# Patient Record
Sex: MALE | Race: WHITE | Employment: OTHER | URBAN - METROPOLITAN AREA
[De-identification: names, ages, dates, MRNs, and addresses within clinical notes are randomized per-mention and may not be internally consistent; named-entity substitution may affect disease eponyms.]

---

## 2018-11-28 DIAGNOSIS — N28.89 RENAL MASS: ICD-10-CM

## 2018-11-28 DIAGNOSIS — R73.9 HYPERGLYCEMIA: ICD-10-CM

## 2019-10-16 ENCOUNTER — HOSPITAL ENCOUNTER (OUTPATIENT)
Age: 64
Discharge: HOME | End: 2019-10-18
Payer: COMMERCIAL

## 2019-10-16 ENCOUNTER — OFFICE VISIT (OUTPATIENT)
Dept: PRIMARY CARE CLINIC | Age: 64
End: 2019-10-16
Payer: COMMERCIAL

## 2019-10-16 VITALS
BODY MASS INDEX: 29.92 KG/M2 | WEIGHT: 202 LBS | TEMPERATURE: 98.2 F | DIASTOLIC BLOOD PRESSURE: 86 MMHG | HEIGHT: 69 IN | HEART RATE: 80 BPM | SYSTOLIC BLOOD PRESSURE: 120 MMHG

## 2019-10-16 DIAGNOSIS — R73.9 HYPERGLYCEMIA: Primary | ICD-10-CM

## 2019-10-16 DIAGNOSIS — I10 ESSENTIAL HYPERTENSION: ICD-10-CM

## 2019-10-16 DIAGNOSIS — E78.2 MIXED HYPERLIPIDEMIA: ICD-10-CM

## 2019-10-16 DIAGNOSIS — L02.91 ABSCESS: Primary | ICD-10-CM

## 2019-10-16 DIAGNOSIS — L02.91 ABSCESS: ICD-10-CM

## 2019-10-16 PROCEDURE — 87070 CULTURE OTHR SPECIMN AEROBIC: CPT

## 2019-10-16 PROCEDURE — 99214 OFFICE O/P EST MOD 30 MIN: CPT | Performed by: FAMILY MEDICINE

## 2019-10-16 RX ORDER — VIT C/B6/B5/MAGNESIUM/HERB 173 50-5-6-5MG
1 CAPSULE ORAL DAILY
COMMUNITY

## 2019-10-16 RX ORDER — DOXYCYCLINE HYCLATE 100 MG
100 TABLET ORAL 2 TIMES DAILY
Qty: 10 TABLET | Refills: 0 | Status: SHIPPED | OUTPATIENT
Start: 2019-10-16 | End: 2019-10-21

## 2019-10-16 ASSESSMENT — PATIENT HEALTH QUESTIONNAIRE - PHQ9
SUM OF ALL RESPONSES TO PHQ QUESTIONS 1-9: 0
1. LITTLE INTEREST OR PLEASURE IN DOING THINGS: 0
SUM OF ALL RESPONSES TO PHQ9 QUESTIONS 1 & 2: 0
SUM OF ALL RESPONSES TO PHQ QUESTIONS 1-9: 0
2. FEELING DOWN, DEPRESSED OR HOPELESS: 0

## 2019-10-16 ASSESSMENT — ENCOUNTER SYMPTOMS
EYES NEGATIVE: 1
RESPIRATORY NEGATIVE: 1
ALLERGIC/IMMUNOLOGIC NEGATIVE: 1
GASTROINTESTINAL NEGATIVE: 1

## 2019-10-17 ENCOUNTER — HOSPITAL ENCOUNTER (OUTPATIENT)
Age: 64
Discharge: HOME | End: 2019-10-19
Payer: COMMERCIAL

## 2019-10-17 DIAGNOSIS — E78.2 MIXED HYPERLIPIDEMIA: ICD-10-CM

## 2019-10-17 DIAGNOSIS — R73.9 HYPERGLYCEMIA: ICD-10-CM

## 2019-10-17 DIAGNOSIS — I10 ESSENTIAL HYPERTENSION: ICD-10-CM

## 2019-10-17 DIAGNOSIS — L02.91 ABSCESS: ICD-10-CM

## 2019-10-17 LAB
ALBUMIN SERPL-MCNC: 4.6 G/DL (ref 3.5–5.2)
ALP BLD-CCNC: 85 U/L (ref 40–129)
ALT SERPL-CCNC: 33 U/L (ref 0–40)
ANION GAP SERPL CALCULATED.3IONS-SCNC: 15 MMOL/L (ref 7–16)
AST SERPL-CCNC: 25 U/L (ref 0–39)
BASOPHILS ABSOLUTE: 0.04 E9/L (ref 0–0.2)
BASOPHILS RELATIVE PERCENT: 0.5 % (ref 0–2)
BILIRUB SERPL-MCNC: 0.6 MG/DL (ref 0–1.2)
BUN BLDV-MCNC: 19 MG/DL (ref 8–23)
CALCIUM SERPL-MCNC: 9.7 MG/DL (ref 8.6–10.2)
CHLORIDE BLD-SCNC: 102 MMOL/L (ref 98–107)
CHOLESTEROL, TOTAL: 182 MG/DL (ref 0–199)
CO2: 25 MMOL/L (ref 22–29)
CREAT SERPL-MCNC: 1.4 MG/DL (ref 0.7–1.2)
EOSINOPHILS ABSOLUTE: 0.14 E9/L (ref 0.05–0.5)
EOSINOPHILS RELATIVE PERCENT: 1.7 % (ref 0–6)
GFR AFRICAN AMERICAN: >60
GFR NON-AFRICAN AMERICAN: 51 ML/MIN/1.73
GLUCOSE BLD-MCNC: 146 MG/DL (ref 74–99)
HBA1C MFR BLD: 6.8 % (ref 4–5.6)
HCT VFR BLD CALC: 47 % (ref 37–54)
HDLC SERPL-MCNC: 30 MG/DL
HEMOGLOBIN: 15.5 G/DL (ref 12.5–16.5)
IMMATURE GRANULOCYTES #: 0.03 E9/L
IMMATURE GRANULOCYTES %: 0.4 % (ref 0–5)
LDL CHOLESTEROL CALCULATED: 104 MG/DL (ref 0–99)
LYMPHOCYTES ABSOLUTE: 1.61 E9/L (ref 1.5–4)
LYMPHOCYTES RELATIVE PERCENT: 19.9 % (ref 20–42)
MCH RBC QN AUTO: 31.3 PG (ref 26–35)
MCHC RBC AUTO-ENTMCNC: 33 % (ref 32–34.5)
MCV RBC AUTO: 94.9 FL (ref 80–99.9)
MONOCYTES ABSOLUTE: 0.64 E9/L (ref 0.1–0.95)
MONOCYTES RELATIVE PERCENT: 7.9 % (ref 2–12)
NEUTROPHILS ABSOLUTE: 5.64 E9/L (ref 1.8–7.3)
NEUTROPHILS RELATIVE PERCENT: 69.6 % (ref 43–80)
PDW BLD-RTO: 13.1 FL (ref 11.5–15)
PLATELET # BLD: 211 E9/L (ref 130–450)
PMV BLD AUTO: 10.1 FL (ref 7–12)
POTASSIUM SERPL-SCNC: 4.2 MMOL/L (ref 3.5–5)
RBC # BLD: 4.95 E12/L (ref 3.8–5.8)
SODIUM BLD-SCNC: 142 MMOL/L (ref 132–146)
TOTAL PROTEIN: 7.8 G/DL (ref 6.4–8.3)
TRIGL SERPL-MCNC: 241 MG/DL (ref 0–149)
VLDLC SERPL CALC-MCNC: 48 MG/DL
WBC # BLD: 8.1 E9/L (ref 4.5–11.5)

## 2019-10-17 PROCEDURE — 80061 LIPID PANEL: CPT

## 2019-10-17 PROCEDURE — 83036 HEMOGLOBIN GLYCOSYLATED A1C: CPT

## 2019-10-17 PROCEDURE — 36415 COLL VENOUS BLD VENIPUNCTURE: CPT

## 2019-10-17 PROCEDURE — 80053 COMPREHEN METABOLIC PANEL: CPT

## 2019-10-17 PROCEDURE — 85025 COMPLETE CBC W/AUTO DIFF WBC: CPT

## 2019-10-18 LAB
ORGANISM: ABNORMAL
WOUND/ABSCESS: ABNORMAL

## 2019-10-24 ENCOUNTER — OFFICE VISIT (OUTPATIENT)
Dept: PRIMARY CARE CLINIC | Age: 64
End: 2019-10-24
Payer: COMMERCIAL

## 2019-10-24 VITALS
DIASTOLIC BLOOD PRESSURE: 82 MMHG | WEIGHT: 199 LBS | BODY MASS INDEX: 29.39 KG/M2 | SYSTOLIC BLOOD PRESSURE: 132 MMHG | TEMPERATURE: 98.2 F | HEART RATE: 88 BPM

## 2019-10-24 DIAGNOSIS — E78.2 MIXED HYPERLIPIDEMIA: ICD-10-CM

## 2019-10-24 DIAGNOSIS — N18.9 CHRONIC KIDNEY DISEASE, UNSPECIFIED CKD STAGE: ICD-10-CM

## 2019-10-24 DIAGNOSIS — I25.10 CORONARY ARTERY DISEASE INVOLVING NATIVE CORONARY ARTERY OF NATIVE HEART WITHOUT ANGINA PECTORIS: ICD-10-CM

## 2019-10-24 DIAGNOSIS — L02.91 ABSCESS: ICD-10-CM

## 2019-10-24 DIAGNOSIS — Z12.11 SCREENING FOR COLON CANCER: Primary | ICD-10-CM

## 2019-10-24 DIAGNOSIS — R73.9 HYPERGLYCEMIA: ICD-10-CM

## 2019-10-24 DIAGNOSIS — I10 ESSENTIAL HYPERTENSION: ICD-10-CM

## 2019-10-24 PROCEDURE — 99214 OFFICE O/P EST MOD 30 MIN: CPT | Performed by: FAMILY MEDICINE

## 2019-10-24 ASSESSMENT — ENCOUNTER SYMPTOMS
EYES NEGATIVE: 1
GASTROINTESTINAL NEGATIVE: 1
RESPIRATORY NEGATIVE: 1
ALLERGIC/IMMUNOLOGIC NEGATIVE: 1

## 2019-11-16 ENCOUNTER — HOSPITAL ENCOUNTER (OUTPATIENT)
Age: 64
Discharge: HOME | End: 2019-11-18
Payer: COMMERCIAL

## 2019-11-16 DIAGNOSIS — E78.2 MIXED HYPERLIPIDEMIA: ICD-10-CM

## 2019-11-16 LAB
ALBUMIN SERPL-MCNC: 4.8 G/DL (ref 3.5–5.2)
ALP BLD-CCNC: 84 U/L (ref 40–129)
ALT SERPL-CCNC: 34 U/L (ref 0–40)
ANION GAP SERPL CALCULATED.3IONS-SCNC: 16 MMOL/L (ref 7–16)
AST SERPL-CCNC: 32 U/L (ref 0–39)
BILIRUB SERPL-MCNC: 0.8 MG/DL (ref 0–1.2)
BUN BLDV-MCNC: 22 MG/DL (ref 8–23)
CALCIUM SERPL-MCNC: 9.6 MG/DL (ref 8.6–10.2)
CHLORIDE BLD-SCNC: 94 MMOL/L (ref 98–107)
CHOLESTEROL, TOTAL: 171 MG/DL (ref 0–199)
CO2: 26 MMOL/L (ref 22–29)
CREAT SERPL-MCNC: 1.3 MG/DL (ref 0.7–1.2)
GFR AFRICAN AMERICAN: >60
GFR NON-AFRICAN AMERICAN: 55 ML/MIN/1.73
GLUCOSE BLD-MCNC: 146 MG/DL (ref 74–99)
HDLC SERPL-MCNC: 31 MG/DL
LDL CHOLESTEROL CALCULATED: 106 MG/DL (ref 0–99)
POTASSIUM SERPL-SCNC: 4 MMOL/L (ref 3.5–5)
SODIUM BLD-SCNC: 136 MMOL/L (ref 132–146)
TOTAL PROTEIN: 7.6 G/DL (ref 6.4–8.3)
TRIGL SERPL-MCNC: 172 MG/DL (ref 0–149)
VLDLC SERPL CALC-MCNC: 34 MG/DL

## 2019-11-16 PROCEDURE — 36415 COLL VENOUS BLD VENIPUNCTURE: CPT

## 2019-11-16 PROCEDURE — 80061 LIPID PANEL: CPT

## 2019-11-16 PROCEDURE — 80053 COMPREHEN METABOLIC PANEL: CPT

## 2019-11-18 DIAGNOSIS — Z12.11 SCREENING FOR COLON CANCER: ICD-10-CM

## 2019-11-18 LAB
CONTROL: POSITIVE
HEMOCCULT STL QL: NEGATIVE

## 2019-11-18 PROCEDURE — 82274 ASSAY TEST FOR BLOOD FECAL: CPT | Performed by: FAMILY MEDICINE

## 2019-11-21 ENCOUNTER — OFFICE VISIT (OUTPATIENT)
Dept: PRIMARY CARE CLINIC | Age: 64
End: 2019-11-21
Payer: COMMERCIAL

## 2019-11-21 VITALS
HEART RATE: 80 BPM | TEMPERATURE: 98.2 F | SYSTOLIC BLOOD PRESSURE: 134 MMHG | DIASTOLIC BLOOD PRESSURE: 80 MMHG | WEIGHT: 195 LBS | BODY MASS INDEX: 28.8 KG/M2

## 2019-11-21 DIAGNOSIS — L02.91 ABSCESS: ICD-10-CM

## 2019-11-21 DIAGNOSIS — E78.2 MIXED HYPERLIPIDEMIA: ICD-10-CM

## 2019-11-21 DIAGNOSIS — I25.10 CORONARY ARTERY DISEASE INVOLVING NATIVE CORONARY ARTERY OF NATIVE HEART WITHOUT ANGINA PECTORIS: ICD-10-CM

## 2019-11-21 DIAGNOSIS — N18.9 CHRONIC KIDNEY DISEASE, UNSPECIFIED CKD STAGE: ICD-10-CM

## 2019-11-21 DIAGNOSIS — R73.9 HYPERGLYCEMIA: Primary | ICD-10-CM

## 2019-11-21 DIAGNOSIS — I10 ESSENTIAL HYPERTENSION: ICD-10-CM

## 2019-11-21 PROCEDURE — 99214 OFFICE O/P EST MOD 30 MIN: CPT | Performed by: FAMILY MEDICINE

## 2019-11-21 RX ORDER — ATORVASTATIN CALCIUM 20 MG/1
20 TABLET, FILM COATED ORAL DAILY
Qty: 30 TABLET | Refills: 6 | Status: SHIPPED | OUTPATIENT
Start: 2019-11-21

## 2019-11-21 ASSESSMENT — ENCOUNTER SYMPTOMS
EYES NEGATIVE: 1
ALLERGIC/IMMUNOLOGIC NEGATIVE: 1
RESPIRATORY NEGATIVE: 1
GASTROINTESTINAL NEGATIVE: 1

## 2020-02-15 ENCOUNTER — HOSPITAL ENCOUNTER (OUTPATIENT)
Age: 65
Discharge: HOME | End: 2020-02-17
Payer: COMMERCIAL

## 2020-02-15 LAB
ALBUMIN SERPL-MCNC: 4.5 G/DL (ref 3.5–5.2)
ALP BLD-CCNC: 95 U/L (ref 40–129)
ALT SERPL-CCNC: 25 U/L (ref 0–40)
ANION GAP SERPL CALCULATED.3IONS-SCNC: 14 MMOL/L (ref 7–16)
AST SERPL-CCNC: 21 U/L (ref 0–39)
BASOPHILS ABSOLUTE: 0.03 E9/L (ref 0–0.2)
BASOPHILS RELATIVE PERCENT: 0.3 % (ref 0–2)
BILIRUB SERPL-MCNC: 0.9 MG/DL (ref 0–1.2)
BUN BLDV-MCNC: 17 MG/DL (ref 8–23)
CALCIUM SERPL-MCNC: 9.7 MG/DL (ref 8.6–10.2)
CHLORIDE BLD-SCNC: 98 MMOL/L (ref 98–107)
CHOLESTEROL, TOTAL: 119 MG/DL (ref 0–199)
CO2: 28 MMOL/L (ref 22–29)
CREAT SERPL-MCNC: 1.3 MG/DL (ref 0.7–1.2)
EOSINOPHILS ABSOLUTE: 0.17 E9/L (ref 0.05–0.5)
EOSINOPHILS RELATIVE PERCENT: 1.7 % (ref 0–6)
GFR AFRICAN AMERICAN: >60
GFR NON-AFRICAN AMERICAN: 55 ML/MIN/1.73
GLUCOSE BLD-MCNC: 157 MG/DL (ref 74–99)
HBA1C MFR BLD: 6.7 % (ref 4–5.6)
HCT VFR BLD CALC: 45.6 % (ref 37–54)
HDLC SERPL-MCNC: 28 MG/DL
HEMOGLOBIN: 15.3 G/DL (ref 12.5–16.5)
IMMATURE GRANULOCYTES #: 0.02 E9/L
IMMATURE GRANULOCYTES %: 0.2 % (ref 0–5)
LDL CHOLESTEROL CALCULATED: 43 MG/DL (ref 0–99)
LYMPHOCYTES ABSOLUTE: 1.4 E9/L (ref 1.5–4)
LYMPHOCYTES RELATIVE PERCENT: 14.2 % (ref 20–42)
MCH RBC QN AUTO: 31.1 PG (ref 26–35)
MCHC RBC AUTO-ENTMCNC: 33.6 % (ref 32–34.5)
MCV RBC AUTO: 92.7 FL (ref 80–99.9)
MICROALBUMIN UR-MCNC: <12 MG/L
MONOCYTES ABSOLUTE: 0.72 E9/L (ref 0.1–0.95)
MONOCYTES RELATIVE PERCENT: 7.3 % (ref 2–12)
NEUTROPHILS ABSOLUTE: 7.51 E9/L (ref 1.8–7.3)
NEUTROPHILS RELATIVE PERCENT: 76.3 % (ref 43–80)
PDW BLD-RTO: 12.8 FL (ref 11.5–15)
PLATELET # BLD: 204 E9/L (ref 130–450)
PMV BLD AUTO: 10.3 FL (ref 7–12)
POTASSIUM SERPL-SCNC: 3.9 MMOL/L (ref 3.5–5)
RBC # BLD: 4.92 E12/L (ref 3.8–5.8)
SODIUM BLD-SCNC: 140 MMOL/L (ref 132–146)
TOTAL PROTEIN: 7.3 G/DL (ref 6.4–8.3)
TRIGL SERPL-MCNC: 238 MG/DL (ref 0–149)
VLDLC SERPL CALC-MCNC: 48 MG/DL
WBC # BLD: 9.9 E9/L (ref 4.5–11.5)

## 2020-02-15 PROCEDURE — 83036 HEMOGLOBIN GLYCOSYLATED A1C: CPT

## 2020-02-15 PROCEDURE — 82044 UR ALBUMIN SEMIQUANTITATIVE: CPT

## 2020-02-15 PROCEDURE — 80053 COMPREHEN METABOLIC PANEL: CPT

## 2020-02-15 PROCEDURE — 85025 COMPLETE CBC W/AUTO DIFF WBC: CPT

## 2020-02-15 PROCEDURE — 80061 LIPID PANEL: CPT

## 2020-02-15 PROCEDURE — 36415 COLL VENOUS BLD VENIPUNCTURE: CPT

## 2020-02-24 ENCOUNTER — OFFICE VISIT (OUTPATIENT)
Dept: PRIMARY CARE CLINIC | Age: 65
End: 2020-02-24
Payer: COMMERCIAL

## 2020-02-24 VITALS
HEART RATE: 76 BPM | SYSTOLIC BLOOD PRESSURE: 120 MMHG | TEMPERATURE: 98.2 F | WEIGHT: 198 LBS | BODY MASS INDEX: 29.24 KG/M2 | DIASTOLIC BLOOD PRESSURE: 70 MMHG

## 2020-02-24 PROCEDURE — 99214 OFFICE O/P EST MOD 30 MIN: CPT | Performed by: FAMILY MEDICINE

## 2020-02-24 ASSESSMENT — PATIENT HEALTH QUESTIONNAIRE - PHQ9
2. FEELING DOWN, DEPRESSED OR HOPELESS: 0
SUM OF ALL RESPONSES TO PHQ QUESTIONS 1-9: 0
SUM OF ALL RESPONSES TO PHQ9 QUESTIONS 1 & 2: 0
1. LITTLE INTEREST OR PLEASURE IN DOING THINGS: 0
SUM OF ALL RESPONSES TO PHQ QUESTIONS 1-9: 0

## 2020-02-24 ASSESSMENT — ENCOUNTER SYMPTOMS
ALLERGIC/IMMUNOLOGIC NEGATIVE: 1
EYES NEGATIVE: 1
GASTROINTESTINAL NEGATIVE: 1
RESPIRATORY NEGATIVE: 1

## 2020-02-24 NOTE — PROGRESS NOTES
OFFICE PROGRESS NOTE      SUBJECTIVE:        Patient ID:   Jaylan Mann is a 72 y.o. male who presents for   Chief Complaint   Patient presents with    Hypertension     Here for recheck on Hypertension,Hyperlipidemia and Hyperglycemia. Lab work done,here for review. States glucose was 180 today. HPI:     Patient states he is feeling good  Has been followed by the cardiologist  Has been followed by the nephrologist  Lab work showed elevated blood sugar  Patient states he did not follow the diet and he did not exercise enough  Eye exam is normal      Prior to Visit Medications    Medication Sig Taking?  Authorizing Provider   atorvastatin (LIPITOR) 20 MG tablet Take 1 tablet by mouth daily Yes Otto Servin MD   Turmeric 500 MG CAPS Take 1 capsule by mouth daily Yes Historical Provider, MD   DilTIAZem HCl ER Coated Beads 240 MG TB24 TAKE ONE TABLET BY MOUTH EVERY DAY Yes Historical Provider, MD   potassium chloride (KLOR-CON) 10 MEQ extended release tablet TAKE ONE TABLET BY MOUTH EVERY DAY Yes Historical Provider, MD   Glucos-Chondroit-Hyaluron-MSM (GLUCOSAMINE CHONDROITIN JOINT PO) Take 1,500 mg by mouth daily Yes Historical Provider, MD   aspirin 81 MG tablet Take 81 mg by mouth daily  Yes Historical Provider, MD   hydrochlorothiazide (HYDRODIURIL) 25 MG tablet Take 25 mg by mouth daily Yes Historical Provider, MD      Social History     Socioeconomic History    Marital status:      Spouse name: None    Number of children: None    Years of education: None    Highest education level: None   Occupational History    None   Social Needs    Financial resource strain: None    Food insecurity:     Worry: None     Inability: None    Transportation needs:     Medical: None     Non-medical: None   Tobacco Use    Smoking status: Never Smoker    Smokeless tobacco: Never Used   Substance and Sexual Activity    Alcohol use: No    Drug use: No    Sexual activity: None   Lifestyle    Physical activity:     Days per week: None     Minutes per session: None    Stress: None   Relationships    Social connections:     Talks on phone: None     Gets together: None     Attends Mandaeism service: None     Active member of club or organization: None     Attends meetings of clubs or organizations: None     Relationship status: None    Intimate partner violence:     Fear of current or ex partner: None     Emotionally abused: None     Physically abused: None     Forced sexual activity: None   Other Topics Concern    None   Social History Narrative    None       I have reviewed Asim's allergies, medications, problem list, medical, social and family history and have updated as needed in the electronic medical record  Review Of Systems:    Review of Systems   Constitutional: Negative. HENT: Negative. Eyes: Negative. Respiratory: Negative. Cardiovascular: Negative. Gastrointestinal: Negative. Endocrine: Negative. Genitourinary: Negative. Musculoskeletal: Negative. Allergic/Immunologic: Negative. Neurological: Negative. Hematological: Negative. Psychiatric/Behavioral: Negative. OBJECTIVE:     VS:  Wt Readings from Last 3 Encounters:   02/24/20 198 lb (89.8 kg)   11/21/19 195 lb (88.5 kg)   10/24/19 199 lb (90.3 kg)     Temp Readings from Last 3 Encounters:   02/24/20 98.2 °F (36.8 °C) (Oral)   11/21/19 98.2 °F (36.8 °C) (Oral)   10/24/19 98.2 °F (36.8 °C) (Oral)     BP Readings from Last 3 Encounters:   02/24/20 120/70   11/21/19 134/80   10/24/19 132/82        Physical Exam  Constitutional:       Appearance: He is well-developed. HENT:      Head: Normocephalic and atraumatic. Eyes:      Conjunctiva/sclera: Conjunctivae normal.      Pupils: Pupils are equal, round, and reactive to light. Neck:      Musculoskeletal: Normal range of motion and neck supple.    Cardiovascular:      Rate and Rhythm: Normal rate and

## 2020-03-16 ENCOUNTER — TELEPHONE (OUTPATIENT)
Dept: PRIMARY CARE CLINIC | Age: 65
End: 2020-03-16

## 2020-03-16 NOTE — TELEPHONE ENCOUNTER
Message left for patient that he may do his lab work for Dr Tate Albright the same time as his lab work for Dr José Miguel Yanes.

## 2020-03-16 NOTE — TELEPHONE ENCOUNTER
Patient called this morning wanting to speak with Kurtis Son about his visit with the urologist on Friday. States he was 10 min late and they gave him a hard time about it. States he ended up almost walking out. States he is supposed to have bloodwork done for them and for us and doesn't want to do it twice. Patient does not really want to go back and requests nurse to call the cell. If he doesn't answer leave a message and he will call back.

## 2022-11-30 ENCOUNTER — TELEPHONE (OUTPATIENT)
Dept: FAMILY MEDICINE CLINIC | Age: 67
End: 2022-11-30

## 2022-11-30 DIAGNOSIS — R73.9 HYPERGLYCEMIA: ICD-10-CM

## 2022-11-30 DIAGNOSIS — E78.2 MIXED HYPERLIPIDEMIA: ICD-10-CM

## 2022-11-30 DIAGNOSIS — I10 HYPERTENSION, UNSPECIFIED TYPE: Primary | ICD-10-CM

## 2022-11-30 NOTE — TELEPHONE ENCOUNTER
----- Message from Rich Warren sent at 11/30/2022  3:13 PM EST -----  Subject: Message to Provider    QUESTIONS  Information for Provider? Patient wants to know if he needs blood work for   his physical.   ---------------------------------------------------------------------------  --------------  Jimenez Segura KRISTINA  [phone]; OK to leave message on voicemail  ---------------------------------------------------------------------------  --------------  SCRIPT ANSWERS  Relationship to Patient?  Self

## 2022-12-01 ENCOUNTER — HOSPITAL ENCOUNTER (OUTPATIENT)
Dept: CARDIAC REHAB | Age: 67
Setting detail: THERAPIES SERIES
Discharge: HOME | End: 2022-12-01
Payer: COMMERCIAL

## 2022-12-01 VITALS
HEART RATE: 73 BPM | WEIGHT: 180.3 LBS | RESPIRATION RATE: 18 BRPM | DIASTOLIC BLOOD PRESSURE: 74 MMHG | SYSTOLIC BLOOD PRESSURE: 130 MMHG | BODY MASS INDEX: 26.7 KG/M2 | HEIGHT: 69 IN

## 2022-12-01 PROCEDURE — 93797 PHYS/QHP OP CAR RHAB WO ECG: CPT

## 2022-12-01 PROCEDURE — 93798 PHYS/QHP OP CAR RHAB W/ECG: CPT

## 2022-12-01 ASSESSMENT — EXERCISE STRESS TEST
PEAK_RPE: 6
PEAK_RPD: 0
PEAK_HR: 109
PEAK_BP: 134/70
PEAK_BP: 136/72

## 2022-12-01 ASSESSMENT — PATIENT HEALTH QUESTIONNAIRE - PHQ9
5. POOR APPETITE OR OVEREATING: 0
SUM OF ALL RESPONSES TO PHQ QUESTIONS 1-9: 0
9. THOUGHTS THAT YOU WOULD BE BETTER OFF DEAD, OR OF HURTING YOURSELF: 0
6. FEELING BAD ABOUT YOURSELF - OR THAT YOU ARE A FAILURE OR HAVE LET YOURSELF OR YOUR FAMILY DOWN: 0
7. TROUBLE CONCENTRATING ON THINGS, SUCH AS READING THE NEWSPAPER OR WATCHING TELEVISION: 0
SUM OF ALL RESPONSES TO PHQ QUESTIONS 1-9: 0
8. MOVING OR SPEAKING SO SLOWLY THAT OTHER PEOPLE COULD HAVE NOTICED. OR THE OPPOSITE, BEING SO FIGETY OR RESTLESS THAT YOU HAVE BEEN MOVING AROUND A LOT MORE THAN USUAL: 0
10. IF YOU CHECKED OFF ANY PROBLEMS, HOW DIFFICULT HAVE THESE PROBLEMS MADE IT FOR YOU TO DO YOUR WORK, TAKE CARE OF THINGS AT HOME, OR GET ALONG WITH OTHER PEOPLE: 0
2. FEELING DOWN, DEPRESSED OR HOPELESS: 0
1. LITTLE INTEREST OR PLEASURE IN DOING THINGS: 0
3. TROUBLE FALLING OR STAYING ASLEEP: 0
4. FEELING TIRED OR HAVING LITTLE ENERGY: 0
SUM OF ALL RESPONSES TO PHQ9 QUESTIONS 1 & 2: 0

## 2022-12-01 ASSESSMENT — EJECTION FRACTION: EF_VALUE: 50

## 2022-12-02 ENCOUNTER — HOSPITAL ENCOUNTER (OUTPATIENT)
Dept: CARDIAC REHAB | Age: 67
Setting detail: THERAPIES SERIES
Discharge: HOME | End: 2022-12-02
Payer: COMMERCIAL

## 2022-12-02 PROCEDURE — 93798 PHYS/QHP OP CAR RHAB W/ECG: CPT

## 2022-12-02 RX ORDER — CLOPIDOGREL BISULFATE 75 MG/1
75 TABLET ORAL DAILY
COMMUNITY

## 2022-12-02 NOTE — PROGRESS NOTES
12/01/22 1130   Treatment Diagnosis   Treatment Diagnosis 1 NSTEMI   NSTEMI Date 10/04/22   Treatment Diagnosis 2 CABG   CABG Date 10/10/22   Referral Date 11/23/22   Oxygen Saturation / Titration    Stages of Change  Maintenance   Oxygen Intervention   Oxygen Use No   O2 Sat Greater Than 90% Yes   Individual Treatment Plan   ITP Visit Type Initial assessment   1st Date of Exercise  12/01/22   ITP Next Review Date 12/26/22   Visit #/Total Visits 2/36   EF% 50 %   Risk Stratification Low   ITP Exercise;Psychosocial;Tobacco;Nutrition;Education   Exercise    DASI Total Score 24.2   Stages of Change Action   Test Six minute walk test   Data Measured Before Walk   Heart Rate 73   Blood Pressure 130/74   O2 Saturation 96   O2 Device Room air   RPD 0   RPE 6   Data Measured during Walk   Indicate Mode of RPE 3 minutes   RPE Data Measured During   Do You Have Shortness of Breath No   Describe Type of Pain You Are Having none   Peak RPE 6   Peak RPD 0   Data Measured Immediately After Walk   Distance Walked in Meters 363 meters   Peak Heart Rate 109   Peak Blood Pressure 134/70   Modified Radha Scale 0   RPE 6   O2 Saturation 94   Data Measured at 5 Minutes After Walk   Heart Rate 70   Blood Pressure 128/68   SpO2 98 %   Comments PT tolerated walk well   Exercise Prescription   Mode Treadmill;Bike;Stepper;Ergometer   Frequency per week 3x/week   Duration Per Session 30-40 minutes   Intensity METS       2-4   RPE 11-17   Progression to 60 minutes   Target Heart Rate    Resistance Training No   Exercise Blood Pressures   Resting /74   Peak /72   Is BP WDL Yes   Exercise Activity at Home   Type walking   Frequency off days   Duration 30 minutes   Resistance Training No   Exercise Education   Education Exercise safety;RPE scale;Signs/symptoms to report;Equipment orientation   Exercise Target Goal   Target Goal(s) Individual exercise RX   Psychosocial   Stages of Change Maintenance   PHQ-9 Total Score 0 Psychosocial Intervention   Interventions No intervention indicated   Currently Taking Psychotropic Meds No   Medication Changes No   Psychosocial Education   Education Advanced directives   Psychosocial Target Goals   Target Goal(s) Assess presence or absence of depression using a valid screening tool   Tobacco   Stages of Change Maintenance   Tobacco Use No   Tobacco Cessation Intervention   Smoking Cessation Referral No   Tobacco Adjunct No   Tobacco Education   Resource Information Provided Yes   Nutrition   Diabetes Yes      FBS Date 12/02/22   Lipids   Date Lipids Drawn 02/15/20   Total 119   HDL 28      Triglycerides 238   Weight Management   Rate Your Plate Total Score 50   Alcohol None   Weight  180 lb 4.8 oz (81.8 kg)   Height  5' 9\" (1.753 m)   BMI 26.68   Education   Cardiac Knowledge Total Score 7   Education Intervention   Education Schedule Given Yes   Patient Education    Education Risk factors   Hypertension Yes   Is BP WDL Yes   Med(s) Change No   BP Meds metoprolol   ACE/ARB Prescribed No   ASA Prescribed Yes   ASA Adherent Yes   Antiplatelet Prescribed Yes   Antiplatelet Adherent Yes   BB Prescribed Yes   BB Adherent Yes   Statins Prescribed Yes   Statins Adherent Yes   Statin Intensity Moderate   Education Target Goals   Target Goals Medication compliance   Staff Treatment Goals   Goals Exercise; Functional capacity   Exercise Goal increase stamina and endurance   Exercise Goal Status Initial   Exercise Goal Assessment Key functional changes

## 2022-12-05 ENCOUNTER — HOSPITAL ENCOUNTER (OUTPATIENT)
Dept: CARDIAC REHAB | Age: 67
Setting detail: THERAPIES SERIES
Discharge: HOME | End: 2022-12-05
Payer: COMMERCIAL

## 2022-12-05 PROCEDURE — 93798 PHYS/QHP OP CAR RHAB W/ECG: CPT

## 2022-12-06 DIAGNOSIS — I10 HYPERTENSION, UNSPECIFIED TYPE: ICD-10-CM

## 2022-12-06 DIAGNOSIS — E78.2 MIXED HYPERLIPIDEMIA: ICD-10-CM

## 2022-12-06 DIAGNOSIS — R73.9 HYPERGLYCEMIA: ICD-10-CM

## 2022-12-06 LAB
HCT VFR BLD CALC: 42.8 % (ref 37–54)
HEMOGLOBIN: 13.9 G/DL (ref 12.5–16.5)
MCH RBC QN AUTO: 30.2 PG (ref 26–35)
MCHC RBC AUTO-ENTMCNC: 32.5 % (ref 32–34.5)
MCV RBC AUTO: 93 FL (ref 80–99.9)
PDW BLD-RTO: 13.6 FL (ref 11.5–15)
PLATELET # BLD: 213 E9/L (ref 130–450)
PMV BLD AUTO: 10 FL (ref 7–12)
RBC # BLD: 4.6 E12/L (ref 3.8–5.8)
WBC # BLD: 13.2 E9/L (ref 4.5–11.5)

## 2022-12-07 ENCOUNTER — HOSPITAL ENCOUNTER (OUTPATIENT)
Dept: CARDIAC REHAB | Age: 67
Setting detail: THERAPIES SERIES
Discharge: HOME | End: 2022-12-07
Payer: COMMERCIAL

## 2022-12-07 LAB
ALBUMIN SERPL-MCNC: 4.3 G/DL (ref 3.5–5.2)
ALP BLD-CCNC: 105 U/L (ref 40–129)
ALT SERPL-CCNC: 17 U/L (ref 0–40)
ANION GAP SERPL CALCULATED.3IONS-SCNC: 16 MMOL/L (ref 7–16)
AST SERPL-CCNC: 16 U/L (ref 0–39)
BILIRUB SERPL-MCNC: 1.1 MG/DL (ref 0–1.2)
BUN BLDV-MCNC: 15 MG/DL (ref 6–23)
CALCIUM SERPL-MCNC: 9.7 MG/DL (ref 8.6–10.2)
CHLORIDE BLD-SCNC: 97 MMOL/L (ref 98–107)
CHOLESTEROL, TOTAL: 91 MG/DL (ref 0–199)
CO2: 24 MMOL/L (ref 22–29)
CREAT SERPL-MCNC: 1.1 MG/DL (ref 0.7–1.2)
GFR SERPL CREATININE-BSD FRML MDRD: >60 ML/MIN/1.73
GLUCOSE BLD-MCNC: 161 MG/DL (ref 74–99)
HDLC SERPL-MCNC: 29 MG/DL
LDL CHOLESTEROL CALCULATED: 38 MG/DL (ref 0–99)
POTASSIUM SERPL-SCNC: 4.1 MMOL/L (ref 3.5–5)
SODIUM BLD-SCNC: 137 MMOL/L (ref 132–146)
TOTAL PROTEIN: 7.2 G/DL (ref 6.4–8.3)
TRIGL SERPL-MCNC: 119 MG/DL (ref 0–149)
VLDLC SERPL CALC-MCNC: 24 MG/DL

## 2022-12-07 PROCEDURE — 93798 PHYS/QHP OP CAR RHAB W/ECG: CPT

## 2022-12-08 ENCOUNTER — OFFICE VISIT (OUTPATIENT)
Dept: FAMILY MEDICINE CLINIC | Age: 67
End: 2022-12-08

## 2022-12-08 VITALS
OXYGEN SATURATION: 96 % | BODY MASS INDEX: 27.02 KG/M2 | SYSTOLIC BLOOD PRESSURE: 122 MMHG | DIASTOLIC BLOOD PRESSURE: 74 MMHG | WEIGHT: 183 LBS | HEART RATE: 75 BPM | TEMPERATURE: 97.1 F

## 2022-12-08 DIAGNOSIS — R73.9 HYPERGLYCEMIA: ICD-10-CM

## 2022-12-08 DIAGNOSIS — I10 HYPERTENSION, UNSPECIFIED TYPE: Primary | ICD-10-CM

## 2022-12-08 DIAGNOSIS — I25.110 CORONARY ARTERY DISEASE INVOLVING NATIVE CORONARY ARTERY OF NATIVE HEART WITH UNSTABLE ANGINA PECTORIS (HCC): ICD-10-CM

## 2022-12-08 DIAGNOSIS — E78.2 MIXED HYPERLIPIDEMIA: ICD-10-CM

## 2022-12-08 DIAGNOSIS — E11.9 DIABETES MELLITUS WITHOUT COMPLICATION (HCC): ICD-10-CM

## 2022-12-08 PROBLEM — R91.1 LUNG NODULE: Status: ACTIVE | Noted: 2022-10-07

## 2022-12-08 PROBLEM — N28.89 RENAL MASS: Status: ACTIVE | Noted: 2018-03-19

## 2022-12-08 PROBLEM — D41.01 NEOPLASM OF UNCERTAIN BEHAVIOR OF RIGHT KIDNEY: Status: ACTIVE | Noted: 2018-05-09

## 2022-12-08 PROBLEM — C64.1 RENAL CELL CARCINOMA OF RIGHT KIDNEY (HCC): Status: ACTIVE | Noted: 2019-06-03

## 2022-12-08 PROBLEM — Z90.5 H/O PARTIAL NEPHRECTOMY: Status: ACTIVE | Noted: 2021-06-10

## 2022-12-08 RX ORDER — NITROGLYCERIN 0.4 MG/1
0.4 TABLET SUBLINGUAL EVERY 5 MIN PRN
COMMUNITY
Start: 2022-10-06 | End: 2023-10-01

## 2022-12-08 RX ORDER — ATORVASTATIN CALCIUM 80 MG/1
TABLET, FILM COATED ORAL
COMMUNITY
Start: 2022-11-11

## 2022-12-08 RX ORDER — SILDENAFIL 50 MG/1
TABLET, FILM COATED ORAL
COMMUNITY
Start: 2022-12-01

## 2022-12-08 RX ORDER — GLIPIZIDE 2.5 MG/1
2.5 TABLET, EXTENDED RELEASE ORAL DAILY
Qty: 30 TABLET | Refills: 3 | Status: SHIPPED | OUTPATIENT
Start: 2022-12-08

## 2022-12-08 SDOH — ECONOMIC STABILITY: FOOD INSECURITY: WITHIN THE PAST 12 MONTHS, YOU WORRIED THAT YOUR FOOD WOULD RUN OUT BEFORE YOU GOT MONEY TO BUY MORE.: NEVER TRUE

## 2022-12-08 SDOH — ECONOMIC STABILITY: FOOD INSECURITY: WITHIN THE PAST 12 MONTHS, THE FOOD YOU BOUGHT JUST DIDN'T LAST AND YOU DIDN'T HAVE MONEY TO GET MORE.: NEVER TRUE

## 2022-12-08 ASSESSMENT — ENCOUNTER SYMPTOMS
ALLERGIC/IMMUNOLOGIC NEGATIVE: 1
RESPIRATORY NEGATIVE: 1
GASTROINTESTINAL NEGATIVE: 1
EYES NEGATIVE: 1

## 2022-12-08 ASSESSMENT — SOCIAL DETERMINANTS OF HEALTH (SDOH): HOW HARD IS IT FOR YOU TO PAY FOR THE VERY BASICS LIKE FOOD, HOUSING, MEDICAL CARE, AND HEATING?: NOT HARD AT ALL

## 2022-12-08 NOTE — PROGRESS NOTES
OFFICE PROGRESS NOTE      SUBJECTIVE:        Patient ID:   Shayanynes Paige is a 79 y.o. male who presents for   Chief Complaint   Patient presents with    Hypertension     Here for recheck on Hypertension,Hyperlipidemia  and Hyperglycemia. Lab work done,here for review. Patient reports feeling well. Attending cardiac rehab 3 times a week. HPI:   Patient here for the follow-up  Patient had a cardiac surgery and has been going to the rehab  Has diabetes had been started with Januvia  Patient did not like Januvia he wants to start different medication  Lab work reviewed  Shows that blood sugar 161  Rest of the lab work is normal  Patient does follow the diet  Also follows up with a cardiologist      Prior to Visit Medications    Medication Sig Taking? Authorizing Provider   atorvastatin (LIPITOR) 80 MG tablet TAKE ONE TABLET BY MOUTH DAILY Yes Historical Provider, MD   metoprolol tartrate (LOPRESSOR) 25 MG tablet TAKE 2 TABLETS BY MOUTH TWICE DAILY Yes Historical Provider, MD   nitroGLYCERIN (NITROSTAT) 0.4 MG SL tablet Place 0.4 mg under the tongue every 5 minutes as needed Yes Historical Provider, MD   sildenafil (VIAGRA) 50 MG tablet TAKE 1 TABLET BY MOUTH IF NEEDED AS DIRECTED.  Yes Historical Provider, MD   clopidogrel (PLAVIX) 75 MG tablet Take 75 mg by mouth daily Yes Historical Provider, MD   SITagliptin (JANUVIA) 50 MG tablet Take 50 mg by mouth daily Yes Historical Provider, MD   aspirin 81 MG tablet Take 81 mg by mouth daily  Yes Historical Provider, MD      Social History     Socioeconomic History    Marital status:      Spouse name: None    Number of children: None    Years of education: None    Highest education level: None   Tobacco Use    Smoking status: Never    Smokeless tobacco: Never   Vaping Use    Vaping Use: Never used   Substance and Sexual Activity    Alcohol use: No    Drug use: No     Social Determinants of Health     Financial Resource Strain: Low Risk     Difficulty of Paying Living Expenses: Not hard at all   Food Insecurity: No Food Insecurity    Worried About Running Out of Food in the Last Year: Never true    Ran Out of Food in the Last Year: Never true       I have reviewed Asim's allergies, medications, problem list, medical, social and family history and have updated as needed in the electronic medical record  Review Of Systems:    Review of Systems   Constitutional: Negative. HENT: Negative. Eyes: Negative. Respiratory: Negative. Cardiovascular: Negative. Gastrointestinal: Negative. Endocrine: Negative. Genitourinary: Negative. Musculoskeletal: Negative. Allergic/Immunologic: Negative. Neurological: Negative. Hematological: Negative. Psychiatric/Behavioral: Negative. OBJECTIVE:     VS:  Wt Readings from Last 3 Encounters:   12/08/22 183 lb (83 kg)   12/01/22 180 lb 4.8 oz (81.8 kg)   02/24/20 198 lb (89.8 kg)     Temp Readings from Last 3 Encounters:   12/08/22 97.1 °F (36.2 °C) (Infrared)   02/24/20 98.2 °F (36.8 °C) (Oral)   11/21/19 98.2 °F (36.8 °C) (Oral)     BP Readings from Last 3 Encounters:   12/08/22 122/74   12/01/22 130/74   02/24/20 120/70        Physical Exam  Constitutional:       Appearance: He is well-developed. HENT:      Head: Normocephalic and atraumatic. Eyes:      Conjunctiva/sclera: Conjunctivae normal.      Pupils: Pupils are equal, round, and reactive to light. Cardiovascular:      Rate and Rhythm: Normal rate and regular rhythm. Heart sounds: Normal heart sounds. Pulmonary:      Effort: Pulmonary effort is normal.      Breath sounds: Normal breath sounds. Abdominal:      General: Bowel sounds are normal.      Palpations: Abdomen is soft. Musculoskeletal:         General: Normal range of motion. Cervical back: Normal range of motion and neck supple. Skin:     General: Skin is warm and dry.    Neurological:      Mental Status: He is alert and oriented to person, place, and time. Psychiatric:         Behavior: Behavior normal.          Labs :    Lab Results   Component Value Date    WBC 13.2 (H) 12/06/2022    HGB 13.9 12/06/2022    HCT 42.8 12/06/2022     12/06/2022    CHOL 91 12/06/2022    TRIG 119 12/06/2022    HDL 29 12/06/2022    ALT 17 12/06/2022    AST 16 12/06/2022     12/06/2022    K 4.1 12/06/2022    CL 97 (L) 12/06/2022    CREATININE 1.1 12/06/2022    BUN 15 12/06/2022    CO2 24 12/06/2022    PSA 1.39 11/16/2016    INR 1.0 02/15/2018    LABA1C 6.7 (H) 02/15/2020    LABMICR <12.0 02/15/2020     No results found for: VOL, APPEARANCE, COLORU, LABSPEC, LABPH, LEUKBLD, NITRU, GLUCOSEU, KETUA, UROBILINOGEN, KETUA, UROBILINOGEN, BILIRUBINUR, OCBU  Lab Results   Component Value Date/Time    PSA 1.39 11/16/2016 12:00 PM         Controlled Substances Monitoring:                                    ASSESSMENT     Patient Active Problem List    Diagnosis Date Noted    Lung nodule 10/07/2022    Diabetes mellitus without complication ([facility] [address] 67/13/8619    H/O partial nephrectomy 06/10/2021    Renal cell carcinoma of right kidney ([facility] [address] 06/03/2019    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 Hypertension, unspecified type  I10       2. Mixed hyperlipidemia  E78.2       3. Hyperglycemia  R73.9       4. Coronary artery disease involving native coronary artery of native heart with unstable angina pectoris ([address]  I25.110       5.  Diabetes mellitus without complication (HCC)  Y02.5           PLAN:   Continue low-sodium low-fat diabetic diet  Follow-up with the cardiac rehab  Follow-up with a cardiologist  Discontinue Januvia  Start glipizide 2.5 mg daily patient request  Keep the log of the blood sugars and the blood pressure  Return to clinic earlier if any problems        Patient Instructions   Continue low-sodium low-fat diabetic diet  Continue cardiac rehab  Follow-up with a cardiologist  Stop taking Januvia  Start taking glipizide  Serial blood sugar and blood pressure reading  Bring the log next time  Turn to clinic earlier if any problems    Return in about 4 weeks (around 1/5/2023) for Medication Check, test results, diabetes check. Darell Rodriguez reviewed my findings and recommendations with Rema Toro.     Electronicallysigned by Tianna Sylvester MD on 12/8/22 at 1:25 PM EST

## 2022-12-08 NOTE — PATIENT INSTRUCTIONS
Continue low-sodium low-fat diabetic diet  Continue cardiac rehab  Follow-up with a cardiologist  Stop taking Januvia  Start taking glipizide  Serial blood sugar and blood pressure reading  Bring the log next time  Turn to clinic earlier if any problems

## 2022-12-09 ENCOUNTER — HOSPITAL ENCOUNTER (OUTPATIENT)
Dept: CARDIAC REHAB | Age: 67
Setting detail: THERAPIES SERIES
Discharge: HOME | End: 2022-12-09
Payer: COMMERCIAL

## 2022-12-09 PROCEDURE — 93798 PHYS/QHP OP CAR RHAB W/ECG: CPT

## 2022-12-12 ENCOUNTER — HOSPITAL ENCOUNTER (OUTPATIENT)
Dept: CARDIAC REHAB | Age: 67
Setting detail: THERAPIES SERIES
Discharge: HOME | End: 2022-12-12
Payer: COMMERCIAL

## 2022-12-12 PROCEDURE — 93798 PHYS/QHP OP CAR RHAB W/ECG: CPT

## 2022-12-13 ENCOUNTER — TELEPHONE (OUTPATIENT)
Dept: CARDIAC REHAB | Age: 67
End: 2022-12-13

## 2022-12-13 NOTE — TELEPHONE ENCOUNTER
12/13/2022 1337 Nutrition: Left voice message on this date for patient , Vicki Syed, regarding nutrition appointment on 12/14/2022 at 9:00 am. Will remain available.  Electronically signed by Katiana Smith RD, LD on 12/13/22 at 1:38 PM EST

## 2022-12-14 ENCOUNTER — HOSPITAL ENCOUNTER (OUTPATIENT)
Dept: CARDIAC REHAB | Age: 67
Setting detail: THERAPIES SERIES
Discharge: HOME | End: 2022-12-14
Payer: COMMERCIAL

## 2022-12-14 VITALS — BODY MASS INDEX: 26.22 KG/M2 | WEIGHT: 177 LBS | HEIGHT: 69 IN

## 2022-12-14 PROCEDURE — 93797 PHYS/QHP OP CAR RHAB WO ECG: CPT

## 2022-12-14 PROCEDURE — 93798 PHYS/QHP OP CAR RHAB W/ECG: CPT

## 2022-12-14 NOTE — PROGRESS NOTES
CARDIAC REHAB NUTRITION ASSESSMENT 1:1     NAME: Rema Toro : 1955 AGE: 79 y.o. GENDER: male    CARDIAC REHAB ADMITTING DIAGNOSIS: s/p STEMI and s/p CABG x4     PROBLEM LIST:    Patient Active Problem List   Diagnosis    Closed displaced combined transverse-posterior fracture of left acetabulum ([facility] [address]    Primary hypertension    Coronary artery disease involving native coronary artery of native heart with unstable angina pectoris ([facility] [address]    Renal mass    Mixed hyperlipidemia    Hyperglycemia    Diabetes mellitus without complication ([facility] [address]    H/O partial nephrectomy    Lung nodule    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  Authorizing Provider   atorvastatin (LIPITOR) 80 MG tablet TAKE ONE TABLET BY MOUTH DAILY 22   Historical Provider, MD   metoprolol tartrate (LOPRESSOR) 25 MG tablet TAKE 2 TABLETS BY MOUTH TWICE DAILY 22   Historical Provider, MD   nitroGLYCERIN (NITROSTAT) 0.4 MG SL tablet Place 0.4 mg under the tongue every 5 minutes as needed 10/6/22 10/1/23  Historical Provider, MD   sildenafil (VIAGRA) 50 MG tablet TAKE 1 TABLET BY MOUTH IF NEEDED AS DIRECTED. 22   Historical Provider, MD   glipiZIDE (GLUCOTROL XL) 2.5 MG extended release tablet Take 1 tablet by mouth daily 22   Tianna Sylvester MD   clopidogrel (PLAVIX) 75 MG tablet Take 75 mg by mouth daily    Historical Provider, MD   aspirin 81 MG tablet Take 81 mg by mouth daily     Historical Provider, MD     Pt  reports taking all medications. Pertinent lipid lowering medications: Lipitor per order. No grapefruit or grapefruit products. Patient verbalized understanding. Blood Glucose this am: 160      Highest Blood Glucose past week 173    Lowest Blood Glucose 1 past week 128     Blood Glucose tested one time per day in am     ANTHROPOMETRICS:    Ht Readings from Last 1 Encounters:   12/14/22 5' 9\" (1.753 m)      Wt Readings from Last 10 Encounters:   12/14/22 177 lb (80.3 kg)   12/08/22 183 lb (83 kg)   12/01/22 180 lb 4.8 oz (81.8 kg)   02/24/20 198 lb (89.8 kg)   11/21/19 195 lb (88.5 kg)   10/24/19 199 lb (90.3 kg)   10/16/19 202 lb (91.6 kg)   02/15/18 191 lb 3.2 oz (86.7 kg)   02/14/18 200 lb (90.7 kg)   11/07/16 198 lb 6.4 oz (90 kg)       BMI Readings from Last 3 Encounters:   12/14/22 26.14 kg/m²   12/08/22 27.02 kg/m²   12/01/22 26.63 kg/m²           IBW:160  # +/- 10%       %IBW:  110 % +/- 10%                BMI: 26.14 kg/M2 Category:  ( overweight)     Waist: 42 inches. Waist circumference 40 inches or under for males. Patient waist circumference high. Patient verbalized understanding. Reported Wt Hx:weight history:  175 to 176 usual weight at home. Cardiac rehab admit weight: 180 pounds. Weight today: 177 pounds. Pt lost 3 pounds over 2 weeks indicating 1.6% weight loss. Pt weight loss not significant but beneficial.     Appearance: Patient appears slightly overweight and wears glasses. Nutrition Diagnosis:   Inadequate PO intake related to history of recent nausea and vomiting as evident by patient comments. ( Update: BMI 26.14)       Reported Diet Hx:Patient reports appetite is good; patient is eating 3 meals per day; eats as a snack 1 apple; no cultural,  no ethnic food prefernoences; Patient diet  at home: no salt, diabetic diet;   no fast food during week ;not eating at sit down restaurants. Pt cooks and shops.      No chewing problems and no swallowing problems    Has own teeth      Rate Your Plate Score: 50     (Score 58-72: Making many healthy choices; 41-57: Some choices need improving 24-40: many choices need improving)     24 HOUR DIET RECALL :     Breakfast: none ( ill - had chills and vomiting)     Lunch:  1 pm, at home, 1 piece toast with butter  and water and pedialyte     Dinner: none     Snacks none ( alternate: usually apple)      Beverages:  Few glasses water per day most dys  Patient drinks 4 cups  decaf coffee per day. Infrequently drinks soda and pop       When working- no breakfast because leaves early at 1 am to 2 am.  Takes fruit, sandwich and occasional salad and beverage to drink. Typical dinner: chicken, pork chops or beef tips, steamed broccoli, noodles or mashed potatoes, usually water    Jamel Ates is participating in rehab; occupation:      Environmental/Social:no alcohol; has adequate funds for food. NUTRITION INTERVENTION:    Nutrition 40 minute one-on-one education & goal setting with [address] with Jamel Ates relevant labs compared to ideals. Reviewed weight history and patient's verbalized weight goal as well as any real or perceived barriers to obtaining the goal. Collaborated with patient to set a specific short and long term weight goal.     Reviewed Rate Your Plate and conducted a verbal diet recall. Assessed for environmental, financial, psychosocial, physical and comorbidities that may impact the food and eating patterns / behaviors of [facility] with patient to set specific nutrient goals as well as specific food / behavior changes that will help patient meet the overall goal of following a heart healthy eating pattern (using guidelines as set forth by the American Heart Association and modeled after healthful eating patterns as recognized by the USDA Dietary Guidelines such as DASH, Mediterranean or plant-based).      Briefly reviewed with Vickie Caraballo the nutrition information: My Plate, Dietary Resolutions, food log, MNT heart healthy information, brief review on serving size, eating out, menu planning, omegas, sterols and alcohol  and encouraged Vickie Caraballo to read thoroughly, ask questions as needed, and use for future reference for heart healthy nutrition information. Vickie Caraballo is not scheduled to participate in Cardiac Rehab group nutrition classes. PATIENT GOALS:     Weight Goals: Patient will maintain current weight (177 lbs) within 5 pounds. Weight range:172 lbs to 185 lbs)     Nutrition Goals:Patient will try to follow my plate guidelines. Daily Recommendations:Patient will try to eat 3 balanced meals and 1 to 2 snacks per day to meet estimated needs. Calories: 200 calories  /day ( weight maintenance)     (using [address] ([phone]) with AF  1.3)     Estimated Total Protein needs:  73 to 80 grams/day     Saturated Fat: no more than 20  g/day    Trans Fat: 0 g/day    Sodium: no more than 1500- 2000  mg/day    Fruit: 2.- 3 cups / day    Vegetables: 2 cups/day    Pt usually eats 1 serving fruit per day and 1 serving currently vegetables daily. Other:    - read and compare food labels    -Keeping a food diary was recommended. Patient is a phase 2 participant and ITP done. Questions addressed. Follow-up plans discussed. Patient will be followed up per pt, and PRN. Contact information provided. Vickie Caraballo verbalized understanding. Peace Bloom MA, RDN, LD  Contact phone: [phone].

## 2022-12-16 ENCOUNTER — HOSPITAL ENCOUNTER (OUTPATIENT)
Dept: CARDIAC REHAB | Age: 67
Setting detail: THERAPIES SERIES
Discharge: HOME | End: 2022-12-16
Payer: COMMERCIAL

## 2022-12-16 PROCEDURE — 93798 PHYS/QHP OP CAR RHAB W/ECG: CPT

## 2022-12-19 ENCOUNTER — HOSPITAL ENCOUNTER (OUTPATIENT)
Dept: CARDIAC REHAB | Age: 67
Setting detail: THERAPIES SERIES
Discharge: HOME | End: 2022-12-19
Payer: COMMERCIAL

## 2022-12-19 PROCEDURE — 93798 PHYS/QHP OP CAR RHAB W/ECG: CPT

## 2022-12-21 ENCOUNTER — HOSPITAL ENCOUNTER (OUTPATIENT)
Dept: CARDIAC REHAB | Age: 67
Setting detail: THERAPIES SERIES
Discharge: HOME | End: 2022-12-21
Payer: COMMERCIAL

## 2022-12-21 PROCEDURE — 93798 PHYS/QHP OP CAR RHAB W/ECG: CPT

## 2022-12-27 ENCOUNTER — HOSPITAL ENCOUNTER (OUTPATIENT)
Dept: CARDIAC REHAB | Age: 67
Setting detail: THERAPIES SERIES
Discharge: HOME | End: 2022-12-27
Payer: COMMERCIAL

## 2022-12-27 PROCEDURE — 93798 PHYS/QHP OP CAR RHAB W/ECG: CPT

## 2022-12-28 ENCOUNTER — HOSPITAL ENCOUNTER (OUTPATIENT)
Dept: CARDIAC REHAB | Age: 67
Setting detail: THERAPIES SERIES
Discharge: HOME | End: 2022-12-28
Payer: COMMERCIAL

## 2022-12-28 PROCEDURE — 93798 PHYS/QHP OP CAR RHAB W/ECG: CPT

## 2022-12-28 ASSESSMENT — EXERCISE STRESS TEST: PEAK_BP: 134/70

## 2022-12-28 ASSESSMENT — PATIENT HEALTH QUESTIONNAIRE - PHQ9
SUM OF ALL RESPONSES TO PHQ QUESTIONS 1-9: 0
1. LITTLE INTEREST OR PLEASURE IN DOING THINGS: 0
2. FEELING DOWN, DEPRESSED OR HOPELESS: 0
SUM OF ALL RESPONSES TO PHQ QUESTIONS 1-9: 0
6. FEELING BAD ABOUT YOURSELF - OR THAT YOU ARE A FAILURE OR HAVE LET YOURSELF OR YOUR FAMILY DOWN: 0
8. MOVING OR SPEAKING SO SLOWLY THAT OTHER PEOPLE COULD HAVE NOTICED. OR THE OPPOSITE, BEING SO FIGETY OR RESTLESS THAT YOU HAVE BEEN MOVING AROUND A LOT MORE THAN USUAL: 0
5. POOR APPETITE OR OVEREATING: 0
3. TROUBLE FALLING OR STAYING ASLEEP: 0
4. FEELING TIRED OR HAVING LITTLE ENERGY: 0
7. TROUBLE CONCENTRATING ON THINGS, SUCH AS READING THE NEWSPAPER OR WATCHING TELEVISION: 0
SUM OF ALL RESPONSES TO PHQ QUESTIONS 1-9: 0
SUM OF ALL RESPONSES TO PHQ9 QUESTIONS 1 & 2: 0
9. THOUGHTS THAT YOU WOULD BE BETTER OFF DEAD, OR OF HURTING YOURSELF: 0
SUM OF ALL RESPONSES TO PHQ QUESTIONS 1-9: 0
10. IF YOU CHECKED OFF ANY PROBLEMS, HOW DIFFICULT HAVE THESE PROBLEMS MADE IT FOR YOU TO DO YOUR WORK, TAKE CARE OF THINGS AT HOME, OR GET ALONG WITH OTHER PEOPLE: 0

## 2022-12-28 ASSESSMENT — EJECTION FRACTION: EF_VALUE: 50

## 2022-12-28 NOTE — PROGRESS NOTES
12/28/22 0900   Treatment Diagnosis   Treatment Diagnosis 1 NSTEMI   NSTEMI Date 10/04/22   Treatment Diagnosis 2 CABG   CABG Date 10/10/22   Referral Date 11/23/22   Oxygen Saturation / Titration    Stages of Change  Maintenance   Oxygen Intervention   Oxygen Use No   O2 Sat Greater Than 90% Yes   Individual Treatment Plan   ITP Visit Type Re-assessment   1st Date of Exercise  12/01/22   ITP Next Review Date 01/25/23   Visit #/Total Visits 14/36   EF% 50 %   Risk Stratification Low   ITP Exercise;Psychosocial;Tobacco;Nutrition;Education   Exercise    Stages of Change Action   Exercise Prescription   Mode Treadmill;Bike;Stepper;Ergometer   Frequency per week 3x/week   Duration Per Session 30-40 minutes   Intensity METS       2-4   RPE 11-17   Progression to 60 minutes   Target Heart Rate    Resistance Training No   Exercise Blood Pressures   Resting /78   Peak /70   Is BP WDL Yes   Exercise Activity at Home   Type walking   Frequency off days   Duration 30 minutes   Resistance Training No   Exercise Education   Education Exercise safety;RPE scale;Signs/symptoms to report;Equipment orientation   Exercise Target Goal   Target Goal(s) Individual exercise RX   Psychosocial   Stages of Change Maintenance   PHQ-9 Total Score 0   Psychosocial Intervention   Interventions No intervention indicated   Currently Taking Psychotropic Meds No   Medication Changes No   Psychosocial Education   Education Advanced directives   Psychosocial Target Goals   Target Goal(s) Assess presence or absence of depression using a valid screening tool   Tobacco   Stages of Change Maintenance   Tobacco Use No   Tobacco Cessation Intervention   Smoking Cessation Referral No   Tobacco Adjunct No   Nutrition   Stages of Change Maintenance   Diabetes Yes      FBS Date 12/14/22   HgbA1c 6.7   HgbA1c Date 10/22/22   BG at Home Yes   BG Frequency test one time per day   Diabetes Med(s) Glpizide 1 tablet 2.5 mg exteneded release daily   Diabetes Med(s) Change No   BG in Range Yes   Random    Lipids   Date Lipids Drawn 12/12/22   Total 91   HDL 29   LDL 38   Triglycerides 119   Lipid Med(s) Lipitor 80 mg tablet daily   Lipid Med Change(s) No   Weight Management   Weight  177 lb (80.3 kg)   Height  5' 9\" (1.753 m)   BMI 26.19   Weight Goal 177 pounds +/-5 punds   Waist Circumference  42   Alcohol None   Nutrition Intervention   Dietitian Consult Yes   Nurse/Patient Discussion No   Nutrition Goals Pt will follow my plate guidelines   Supplemental Nutrition no liquid oral nutrition supplements   Nutrition Class No   Diabetes Education Referral No   Lipid Clinic Referral No   Weight Management Referral No   Nutrition Education   Education Signs/symptoms/treatment of hypo/hyperglycemia; Low saturated fat diet; Low sodium diet;Limit added sugars; Overall healthy eating pattern that emphasizes vegetables, fruits, wholegrains, healthy proteins and non-tropical oils   Nutrition Target Goals   Target Goals LDL-C less than 70 and non-HDL-C <100 for those with ASCVD;Triglycerides less than 150;HbA1C less than 7 percent for those with diabetes; Waist less than 40 inches males, 35 for females;Weight loss of 5-10%   Patient Stated Nutrition Goals Pt will try to eat at least 2 servings fruit and 2 servings vegetables most days. Dietary Outcomes   Daily # Fruits/Vegetables 1   Education   Learning Barrier Ready to learn   Education Intervention   Education Schedule Given Yes   Patient Education    Education Risk factors   Hypertension Yes   Is BP WDL Yes   Med(s) Change No   BP Meds metoprolol   ACE/ARB Prescribed No   ASA Prescribed Yes   ASA Adherent Yes   Antiplatelet Prescribed Yes   Antiplatelet Adherent Yes   BB Prescribed Yes   BB Adherent Yes   Statins Prescribed Yes   Statins Adherent Yes   Statin Intensity Moderate   Education Target Goals   Target Goals Medication compliance   Staff Treatment Goals   Goals Exercise; Functional capacity Exercise Goal increase stamina and endurance   Exercise Goal Status Progressing   Exercise Goal Assessment Key functional changes

## 2022-12-30 ENCOUNTER — HOSPITAL ENCOUNTER (OUTPATIENT)
Dept: CARDIAC REHAB | Age: 67
Setting detail: THERAPIES SERIES
Discharge: HOME | End: 2022-12-30
Payer: COMMERCIAL

## 2022-12-30 PROCEDURE — 93798 PHYS/QHP OP CAR RHAB W/ECG: CPT

## 2023-01-03 ENCOUNTER — HOSPITAL ENCOUNTER (OUTPATIENT)
Dept: CARDIAC REHAB | Age: 68
Setting detail: THERAPIES SERIES
Discharge: HOME | End: 2023-01-03
Payer: COMMERCIAL

## 2023-01-03 PROCEDURE — 93798 PHYS/QHP OP CAR RHAB W/ECG: CPT

## 2023-01-04 ENCOUNTER — HOSPITAL ENCOUNTER (OUTPATIENT)
Dept: CARDIAC REHAB | Age: 68
Setting detail: THERAPIES SERIES
Discharge: HOME | End: 2023-01-04
Payer: COMMERCIAL

## 2023-01-04 PROCEDURE — 93798 PHYS/QHP OP CAR RHAB W/ECG: CPT

## 2023-01-05 ENCOUNTER — OFFICE VISIT (OUTPATIENT)
Dept: FAMILY MEDICINE CLINIC | Age: 68
End: 2023-01-05

## 2023-01-05 VITALS
OXYGEN SATURATION: 97 % | WEIGHT: 189 LBS | SYSTOLIC BLOOD PRESSURE: 98 MMHG | DIASTOLIC BLOOD PRESSURE: 66 MMHG | BODY MASS INDEX: 27.91 KG/M2 | TEMPERATURE: 97.1 F | HEART RATE: 70 BPM

## 2023-01-05 DIAGNOSIS — R73.9 HYPERGLYCEMIA: ICD-10-CM

## 2023-01-05 DIAGNOSIS — E78.2 MIXED HYPERLIPIDEMIA: ICD-10-CM

## 2023-01-05 DIAGNOSIS — Z12.11 SCREENING FOR COLON CANCER: Primary | ICD-10-CM

## 2023-01-05 DIAGNOSIS — E11.9 DIABETES MELLITUS WITHOUT COMPLICATION (HCC): ICD-10-CM

## 2023-01-05 DIAGNOSIS — I10 HYPERTENSION, UNSPECIFIED TYPE: ICD-10-CM

## 2023-01-05 DIAGNOSIS — I25.110 CORONARY ARTERY DISEASE INVOLVING NATIVE CORONARY ARTERY OF NATIVE HEART WITH UNSTABLE ANGINA PECTORIS (HCC): ICD-10-CM

## 2023-01-05 PROBLEM — N18.32 STAGE 3B CHRONIC KIDNEY DISEASE (HCC): Status: ACTIVE | Noted: 2018-12-23

## 2023-01-05 RX ORDER — AMLODIPINE BESYLATE 5 MG/1
TABLET ORAL
COMMUNITY
Start: 2022-12-08

## 2023-01-05 RX ORDER — METOPROLOL SUCCINATE 100 MG/1
TABLET, EXTENDED RELEASE ORAL
COMMUNITY
Start: 2022-12-19

## 2023-01-05 ASSESSMENT — ENCOUNTER SYMPTOMS
GASTROINTESTINAL NEGATIVE: 1
RESPIRATORY NEGATIVE: 1
EYES NEGATIVE: 1
ALLERGIC/IMMUNOLOGIC NEGATIVE: 1

## 2023-01-05 ASSESSMENT — PATIENT HEALTH QUESTIONNAIRE - PHQ9
SUM OF ALL RESPONSES TO PHQ QUESTIONS 1-9: 0
SUM OF ALL RESPONSES TO PHQ9 QUESTIONS 1 & 2: 0
2. FEELING DOWN, DEPRESSED OR HOPELESS: 0
1. LITTLE INTEREST OR PLEASURE IN DOING THINGS: 0
SUM OF ALL RESPONSES TO PHQ QUESTIONS 1-9: 0

## 2023-01-05 NOTE — PATIENT INSTRUCTIONS
Continue present treatment  Low-sodium low-fat low-carb diet  Regular exercises  Lab work before the next visit  Follow-up with a cardiologist  Return to clinic earlier if any problems

## 2023-01-05 NOTE — PROGRESS NOTES
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 male who presents for   Chief Complaint   Patient presents with    Hypertension     Here for recheck on Hypertension and Hyperglycemia. Reports glucose was 120 this am.           HPI:     Patient here for the follow-up  Home blood sugar and blood pressure log reviewed  Blood sugar has been better  Patient still goes to cardiac rehab  Has been followed by the cardiologist  Has been taking medication as prescribed  No chest pain       Prior to Visit Medications    Medication Sig Taking? Authorizing Provider   metoprolol succinate (TOPROL XL) 100 MG extended release tablet TAKE ONE TABLET BY MOUTH EVERY DAY Yes Historical Provider, MD   amLODIPine (NORVASC) 5 MG tablet TAKE ONE TABLET BY MOUTH EVERY DAY Yes Historical Provider, MD   atorvastatin (LIPITOR) 80 MG tablet TAKE ONE TABLET BY MOUTH DAILY Yes Historical Provider, MD   metoprolol tartrate (LOPRESSOR) 25 MG tablet TAKE 2 TABLETS BY MOUTH TWICE DAILY Yes Historical Provider, MD   nitroGLYCERIN (NITROSTAT) 0.4 MG SL tablet Place 0.4 mg under the tongue every 5 minutes as needed Yes Historical Provider, MD   sildenafil (VIAGRA) 50 MG tablet TAKE 1 TABLET BY MOUTH IF NEEDED AS DIRECTED.  Yes Historical Provider, MD   glipiZIDE (GLUCOTROL XL) 2.5 MG extended release tablet Take 1 tablet by mouth daily Yes Corry Seth MD   clopidogrel (PLAVIX) 75 MG tablet Take 75 mg by mouth daily Yes Historical Provider, MD   aspirin 81 MG tablet Take 81 mg by mouth daily  Yes Historical Provider, MD      Social History     Socioeconomic History    Marital status:      Spouse name: None    Number of children: None    Years of education: None    Highest education level: None   Tobacco Use    Smoking status: Never    Smokeless tobacco: Never   Vaping Use    Vaping Use: Never used   Substance and Sexual Activity    Alcohol use: No    Drug use: No     Social Determinants of Health     Financial Resource Strain: Low Risk     Difficulty of Paying Living Expenses: Not hard at all   Food Insecurity: No Food Insecurity    Worried About Running Out of Food in the Last Year: Never true    Ran Out of Food in the Last Year: Never true       I have reviewed Asim's allergies, medications, problem list, medical, social and family history and have updated as needed in the electronic medical record  Review Of Systems:    Review of Systems   Constitutional: Negative. HENT: Negative. Eyes: Negative. Respiratory: Negative. Cardiovascular: Negative. Gastrointestinal: Negative. Endocrine: Negative. Genitourinary: Negative. Musculoskeletal: Negative. Allergic/Immunologic: Negative. Neurological: Negative. Hematological: Negative. Psychiatric/Behavioral: Negative. OBJECTIVE:     VS:  Wt Readings from Last 3 Encounters:   01/05/23 189 lb (85.7 kg)   12/14/22 177 lb (80.3 kg)   12/08/22 183 lb (83 kg)     Temp Readings from Last 3 Encounters:   01/05/23 97.1 °F (36.2 °C) (Infrared)   12/08/22 97.1 °F (36.2 °C) (Infrared)   02/24/20 98.2 °F (36.8 °C) (Oral)     BP Readings from Last 3 Encounters:   01/05/23 98/66   12/08/22 122/74   12/01/22 130/74        Physical Exam  Constitutional:       Appearance: He is well-developed. HENT:      Head: Normocephalic and atraumatic. Eyes:      Conjunctiva/sclera: Conjunctivae normal.      Pupils: Pupils are equal, round, and reactive to light. Cardiovascular:      Rate and Rhythm: Normal rate and regular rhythm. Pulses:           Dorsalis pedis pulses are 2+ on the right side and 2+ on the left side. Posterior tibial pulses are 2+ on the right side and 2+ on the left side. Heart sounds: Normal heart sounds. Pulmonary:      Effort: Pulmonary effort is normal.      Breath sounds: Normal breath sounds.    Abdominal:      General: Bowel sounds are normal. Palpations: Abdomen is soft. Musculoskeletal:         General: Normal range of motion. Cervical back: Normal range of motion and neck supple. Right foot: No deformity. Left foot: No deformity. Feet:      Right foot:      Protective Sensation: 7 sites tested. 7 sites sensed. Skin integrity: No ulcer. Left foot:      Protective Sensation: 7 sites tested. 7 sites sensed. Skin integrity: No ulcer. Skin:     General: Skin is warm and dry. Neurological:      Mental Status: He is alert and oriented to person, place, and time.    Psychiatric:         Behavior: Behavior normal.          Labs :    Lab Results   Component Value Date    WBC 13.2 (H) 12/06/2022    HGB 13.9 12/06/2022    HCT 42.8 12/06/2022     12/06/2022    CHOL 91 12/06/2022    TRIG 119 12/06/2022    HDL 29 12/06/2022    ALT 17 12/06/2022    AST 16 12/06/2022     12/06/2022    K 4.1 12/06/2022    CL 97 (L) 12/06/2022    CREATININE 1.1 12/06/2022    BUN 15 12/06/2022    CO2 24 12/06/2022    PSA 1.39 11/16/2016    INR 1.0 02/15/2018    LABA1C 6.7 (H) 02/15/2020    LABMICR <12.0 02/15/2020     No results found for: VOL, APPEARANCE, COLORU, LABSPEC, LABPH, LEUKBLD, NITRU, GLUCOSEU, KETUA, UROBILINOGEN, KETUA, UROBILINOGEN, BILIRUBINUR, OCBU  Lab Results   Component Value Date/Time    PSA 1.39 11/16/2016 12:00 PM         Controlled Substances Monitoring:                                    ASSESSMENT     Patient Active Problem List    Diagnosis Date Noted    Lung nodule 10/07/2022    Diabetes mellitus without complication ([address] 74/14/7468    H/O partial nephrectomy 06/10/2021    Renal cell carcinoma of right kidney ([address] 06/03/2019    Stage 3b chronic kidney disease ([address] 12/23/2018    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 Screening for colon cancer  Z12.11 POCT Fecal Immunochemical Test (FIT)     CBC with Auto Differential      2. Hypertension, unspecified type  I10 CBC with Auto Differential     Comprehensive Metabolic Panel      3. Mixed hyperlipidemia  E78.2 Comprehensive Metabolic Panel     Lipid Panel      4. Hyperglycemia  R73.9 CBC with Auto Differential     Comprehensive Metabolic Panel      5. Coronary artery disease involving native coronary artery of native heart with unstable angina pectoris (HCC)  I25.110 CBC with Auto Differential      6. Diabetes mellitus without complication (HCC)  Y06.1 CBC with Auto Differential     Comprehensive Metabolic Panel     Lipid Panel     Diabetic Foot Exam          PLAN:   Continue present treatment  Follow-up with the cardiology  Low-sodium low-fat low-carb diet  Continue cardiac rehab  Lab work before the next visit  Return to clinic earlier if any problems    All instruction given to the patient      Patient Instructions   Continue present treatment  Low-sodium low-fat low-carb diet  Regular exercises  Lab work before the next visit  Follow-up with a cardiologist  Return to clinic earlier if any problems    Return in about 3 months (around 4/5/2023) for Medication Check, test results, diabetes check. Aura Panda reviewed my findings and recommendations with [facility].     Electronicallysigned by Tony Sorenson MD on 1/5/23 at 12:49 PM EST

## 2023-01-06 ENCOUNTER — HOSPITAL ENCOUNTER (OUTPATIENT)
Dept: CARDIAC REHAB | Age: 68
Setting detail: THERAPIES SERIES
Discharge: HOME | End: 2023-01-06
Payer: COMMERCIAL

## 2023-01-06 PROCEDURE — 93798 PHYS/QHP OP CAR RHAB W/ECG: CPT

## 2023-01-09 ENCOUNTER — HOSPITAL ENCOUNTER (OUTPATIENT)
Dept: CARDIAC REHAB | Age: 68
Setting detail: THERAPIES SERIES
Discharge: HOME | End: 2023-01-09
Payer: COMMERCIAL

## 2023-01-09 PROCEDURE — 93798 PHYS/QHP OP CAR RHAB W/ECG: CPT

## 2023-01-11 ENCOUNTER — HOSPITAL ENCOUNTER (OUTPATIENT)
Dept: CARDIAC REHAB | Age: 68
Setting detail: THERAPIES SERIES
Discharge: HOME | End: 2023-01-11
Payer: COMMERCIAL

## 2023-01-11 PROCEDURE — 93798 PHYS/QHP OP CAR RHAB W/ECG: CPT

## 2023-01-13 ENCOUNTER — HOSPITAL ENCOUNTER (OUTPATIENT)
Dept: CARDIAC REHAB | Age: 68
Setting detail: THERAPIES SERIES
Discharge: HOME | End: 2023-01-13
Payer: COMMERCIAL

## 2023-01-13 PROCEDURE — 93798 PHYS/QHP OP CAR RHAB W/ECG: CPT

## 2023-01-16 ENCOUNTER — HOSPITAL ENCOUNTER (OUTPATIENT)
Dept: CARDIAC REHAB | Age: 68
Setting detail: THERAPIES SERIES
Discharge: HOME | End: 2023-01-16
Payer: MEDICARE

## 2023-01-16 PROCEDURE — 93798 PHYS/QHP OP CAR RHAB W/ECG: CPT

## 2023-01-18 ENCOUNTER — HOSPITAL ENCOUNTER (OUTPATIENT)
Dept: CARDIAC REHAB | Age: 68
Setting detail: THERAPIES SERIES
Discharge: HOME | End: 2023-01-18
Payer: MEDICARE

## 2023-01-18 PROCEDURE — 93798 PHYS/QHP OP CAR RHAB W/ECG: CPT

## 2023-01-20 ENCOUNTER — HOSPITAL ENCOUNTER (OUTPATIENT)
Dept: CARDIAC REHAB | Age: 68
Setting detail: THERAPIES SERIES
Discharge: HOME | End: 2023-01-20
Payer: MEDICARE

## 2023-01-20 PROCEDURE — 93798 PHYS/QHP OP CAR RHAB W/ECG: CPT

## 2023-01-23 ENCOUNTER — HOSPITAL ENCOUNTER (OUTPATIENT)
Dept: CARDIAC REHAB | Age: 68
Setting detail: THERAPIES SERIES
Discharge: HOME | End: 2023-01-23
Payer: MEDICARE

## 2023-01-23 PROCEDURE — 93798 PHYS/QHP OP CAR RHAB W/ECG: CPT

## 2023-01-25 ENCOUNTER — HOSPITAL ENCOUNTER (OUTPATIENT)
Dept: CARDIAC REHAB | Age: 68
Setting detail: THERAPIES SERIES
Discharge: HOME | End: 2023-01-25
Payer: MEDICARE

## 2023-01-25 PROCEDURE — 93798 PHYS/QHP OP CAR RHAB W/ECG: CPT

## 2023-01-25 ASSESSMENT — PATIENT HEALTH QUESTIONNAIRE - PHQ9
1. LITTLE INTEREST OR PLEASURE IN DOING THINGS: 0
7. TROUBLE CONCENTRATING ON THINGS, SUCH AS READING THE NEWSPAPER OR WATCHING TELEVISION: 0
SUM OF ALL RESPONSES TO PHQ QUESTIONS 1-9: 0
8. MOVING OR SPEAKING SO SLOWLY THAT OTHER PEOPLE COULD HAVE NOTICED. OR THE OPPOSITE, BEING SO FIGETY OR RESTLESS THAT YOU HAVE BEEN MOVING AROUND A LOT MORE THAN USUAL: 0
5. POOR APPETITE OR OVEREATING: 0
9. THOUGHTS THAT YOU WOULD BE BETTER OFF DEAD, OR OF HURTING YOURSELF: 0
4. FEELING TIRED OR HAVING LITTLE ENERGY: 0
SUM OF ALL RESPONSES TO PHQ9 QUESTIONS 1 & 2: 0
SUM OF ALL RESPONSES TO PHQ QUESTIONS 1-9: 0
3. TROUBLE FALLING OR STAYING ASLEEP: 0
6. FEELING BAD ABOUT YOURSELF - OR THAT YOU ARE A FAILURE OR HAVE LET YOURSELF OR YOUR FAMILY DOWN: 0
SUM OF ALL RESPONSES TO PHQ QUESTIONS 1-9: 0
10. IF YOU CHECKED OFF ANY PROBLEMS, HOW DIFFICULT HAVE THESE PROBLEMS MADE IT FOR YOU TO DO YOUR WORK, TAKE CARE OF THINGS AT HOME, OR GET ALONG WITH OTHER PEOPLE: 0
SUM OF ALL RESPONSES TO PHQ QUESTIONS 1-9: 0
2. FEELING DOWN, DEPRESSED OR HOPELESS: 0

## 2023-01-25 ASSESSMENT — EJECTION FRACTION: EF_VALUE: 50

## 2023-01-25 ASSESSMENT — EXERCISE STRESS TEST: PEAK_BP: 138/70

## 2023-01-27 ENCOUNTER — HOSPITAL ENCOUNTER (OUTPATIENT)
Dept: CARDIAC REHAB | Age: 68
Setting detail: THERAPIES SERIES
Discharge: HOME | End: 2023-01-27
Payer: MEDICARE

## 2023-01-27 PROCEDURE — 93798 PHYS/QHP OP CAR RHAB W/ECG: CPT

## 2023-01-30 ENCOUNTER — HOSPITAL ENCOUNTER (OUTPATIENT)
Dept: CARDIAC REHAB | Age: 68
Setting detail: THERAPIES SERIES
Discharge: HOME | End: 2023-01-30
Payer: MEDICARE

## 2023-01-30 PROCEDURE — 93798 PHYS/QHP OP CAR RHAB W/ECG: CPT

## 2023-02-01 ENCOUNTER — HOSPITAL ENCOUNTER (OUTPATIENT)
Dept: CARDIAC REHAB | Age: 68
Setting detail: THERAPIES SERIES
Discharge: HOME | End: 2023-02-01
Payer: MEDICARE

## 2023-02-01 PROCEDURE — 93798 PHYS/QHP OP CAR RHAB W/ECG: CPT

## 2023-02-03 ENCOUNTER — HOSPITAL ENCOUNTER (OUTPATIENT)
Dept: CARDIAC REHAB | Age: 68
Setting detail: THERAPIES SERIES
Discharge: HOME | End: 2023-02-03
Payer: MEDICARE

## 2023-02-03 PROCEDURE — 93798 PHYS/QHP OP CAR RHAB W/ECG: CPT

## 2023-02-06 ENCOUNTER — HOSPITAL ENCOUNTER (OUTPATIENT)
Dept: CARDIAC REHAB | Age: 68
Setting detail: THERAPIES SERIES
Discharge: HOME | End: 2023-02-06
Payer: MEDICARE

## 2023-02-06 PROCEDURE — 93798 PHYS/QHP OP CAR RHAB W/ECG: CPT

## 2023-02-08 ENCOUNTER — HOSPITAL ENCOUNTER (OUTPATIENT)
Dept: CARDIAC REHAB | Age: 68
Setting detail: THERAPIES SERIES
Discharge: HOME | End: 2023-02-08
Payer: MEDICARE

## 2023-02-08 PROCEDURE — 93798 PHYS/QHP OP CAR RHAB W/ECG: CPT

## 2023-02-10 ENCOUNTER — HOSPITAL ENCOUNTER (OUTPATIENT)
Dept: CARDIAC REHAB | Age: 68
Setting detail: THERAPIES SERIES
Discharge: HOME | End: 2023-02-10
Payer: MEDICARE

## 2023-02-10 PROCEDURE — 93798 PHYS/QHP OP CAR RHAB W/ECG: CPT

## 2023-02-13 ENCOUNTER — HOSPITAL ENCOUNTER (OUTPATIENT)
Dept: CARDIAC REHAB | Age: 68
Setting detail: THERAPIES SERIES
Discharge: HOME | End: 2023-02-13
Payer: MEDICARE

## 2023-02-13 PROCEDURE — 93798 PHYS/QHP OP CAR RHAB W/ECG: CPT

## 2023-02-15 ENCOUNTER — HOSPITAL ENCOUNTER (OUTPATIENT)
Dept: CARDIAC REHAB | Age: 68
Setting detail: THERAPIES SERIES
Discharge: HOME | End: 2023-02-15
Payer: MEDICARE

## 2023-02-15 PROCEDURE — 93798 PHYS/QHP OP CAR RHAB W/ECG: CPT

## 2023-02-17 ENCOUNTER — HOSPITAL ENCOUNTER (OUTPATIENT)
Dept: CARDIAC REHAB | Age: 68
Setting detail: THERAPIES SERIES
Discharge: HOME | End: 2023-02-17
Payer: MEDICARE

## 2023-02-17 PROCEDURE — 93798 PHYS/QHP OP CAR RHAB W/ECG: CPT

## 2023-02-17 ASSESSMENT — EXERCISE STRESS TEST
PEAK_RPD: 0
PEAK_RPE: 6
PEAK_BP: 148/68
PEAK_HR: 97

## 2023-02-17 ASSESSMENT — EJECTION FRACTION: EF_VALUE: 50

## 2023-02-17 ASSESSMENT — PATIENT HEALTH QUESTIONNAIRE - PHQ9
3. TROUBLE FALLING OR STAYING ASLEEP: 0
8. MOVING OR SPEAKING SO SLOWLY THAT OTHER PEOPLE COULD HAVE NOTICED. OR THE OPPOSITE, BEING SO FIGETY OR RESTLESS THAT YOU HAVE BEEN MOVING AROUND A LOT MORE THAN USUAL: 0
6. FEELING BAD ABOUT YOURSELF - OR THAT YOU ARE A FAILURE OR HAVE LET YOURSELF OR YOUR FAMILY DOWN: 0
SUM OF ALL RESPONSES TO PHQ9 QUESTIONS 1 & 2: 0
4. FEELING TIRED OR HAVING LITTLE ENERGY: 0
1. LITTLE INTEREST OR PLEASURE IN DOING THINGS: 0
7. TROUBLE CONCENTRATING ON THINGS, SUCH AS READING THE NEWSPAPER OR WATCHING TELEVISION: 0
5. POOR APPETITE OR OVEREATING: 0
9. THOUGHTS THAT YOU WOULD BE BETTER OFF DEAD, OR OF HURTING YOURSELF: 0
10. IF YOU CHECKED OFF ANY PROBLEMS, HOW DIFFICULT HAVE THESE PROBLEMS MADE IT FOR YOU TO DO YOUR WORK, TAKE CARE OF THINGS AT HOME, OR GET ALONG WITH OTHER PEOPLE: 0
SUM OF ALL RESPONSES TO PHQ QUESTIONS 1-9: 0
SUM OF ALL RESPONSES TO PHQ QUESTIONS 1-9: 0
2. FEELING DOWN, DEPRESSED OR HOPELESS: 0
SUM OF ALL RESPONSES TO PHQ QUESTIONS 1-9: 0
SUM OF ALL RESPONSES TO PHQ QUESTIONS 1-9: 0

## 2023-02-17 NOTE — PROGRESS NOTES
02/17/23 1000   Treatment Diagnosis   Treatment Diagnosis 1 NSTEMI   NSTEMI Date 10/04/22   Treatment Diagnosis 2 CABG   CABG Date 10/10/22   Referral Date 11/23/22   Oxygen Saturation / Titration    Stages of Change  Maintenance   Oxygen Intervention   Oxygen Use No   O2 Sat Greater Than 90% Yes   Individual Treatment Plan   ITP Visit Type Discharge, completed program   1st Date of Exercise  12/01/22   Visit #/Total Visits 36/36   EF% 50 %   Risk Stratification Low   ITP Exercise;Psychosocial;Tobacco;Nutrition;Education   Exercise    Stages of Change Action   DASI Total Score 58.2   Assisted Devices None   Data Measured Before Walk   Heart Rate 67   Blood Pressure 120/64   O2 Saturation 97   O2 Device Room air   RPD 0   RPE 6   Data Measured during Walk   Indicate Mode of RPE 3 minutes   RPE Data Measured During   Do You Have Shortness of Breath No   Peak RPE 6   Peak RPD 0   Any problems while exercising no   Data Measured Immediately After Walk   Distance Walked in Meters 447 meters   Peak Heart Rate 97   Peak Blood Pressure 148/68   Modified Radha Scale 0   RPE 6   O2 Saturation 97   Distance Walked in  m   Data Measured at 5 Minutes After Walk   Heart Rate 76   Blood Pressure 124/70   SpO2 99 %   Comments PT tolerated walk well   Exercise Prescription   Mode Treadmill;Bike;Stepper;Ergometer   Frequency per week 3x/week   Duration Per Session 60 minutes   Intensity METS       2/6   RPE 11-17   Progression as tolerated   Target Heart Rate    Exercise Blood Pressures   Resting /64   Exercise Activity at Home   Type walking   Frequency off days   Duration 30 minutes   Resistance Training No   Exercise Education   Education Physically active   Exercise Target Goal   Target Goal(s) Individual exercise RX   Psychosocial   Stages of Change Maintenance   PHQ-9 Total Score 0   Psychosocial Intervention   Interventions No intervention indicated   Currently Taking Psychotropic Meds No   Medication Changes No Psychosocial Education   Education Benefits of CPR completion   Psychosocial Target Goals   Target Goal(s) Assess presence or absence of depression using a valid screening tool   Tobacco   Stages of Change Maintenance   Tobacco Use No   Tobacco Cessation Intervention   Smoking Cessation Referral No   Tobacco Adjunct No   Tobacco Education   Resource Information Provided Yes   Nutrition   Stages of Change Maintenance   Diabetes Yes      FBS Date 12/14/22   HgbA1c 6.7   HgbA1c Date 10/22/22   BG at Home Yes   BG Frequency test one time per day   Diabetes Med(s) Glpizide 1 tablet 2.5 mg exteneded release daily   Diabetes Med(s) Change No   BG in Range Yes   Random    Lipids   Date Lipids Drawn 12/12/22   Total 91   HDL 29   LDL 38   Triglycerides 119   Lipid Med(s) Lipitor 80 mg tablet daily   Lipid Med Change(s) No   Weight Management   Weight  182 lb 4.8 oz (82.7 kg)   Height  5' 9\" (1.753 m)   BMI 26.98   Weight Goal 177 pounds +/-5 punds   Waist Circumference  42   Alcohol None   Nutrition Intervention   Dietitian Consult Yes   Nurse/Patient Discussion No   Nutrition Goals Pt will follow my plate guidelines   Supplemental Nutrition no liquid oral nutrition supplements   Nutrition Class No   Diabetes Education Referral No   Lipid Clinic Referral No   Weight Management Referral No   Nutrition Education   Education Signs/symptoms/treatment of hypo/hyperglycemia   Nutrition Target Goals   Target Goals LDL-C less than 70 and non-HDL-C <100 for those with ASCVD;Triglycerides less than 150;HbA1C less than 7 percent for those with diabetes; Waist less than 40 inches males, 35 for females;Weight loss of 5-10%   Patient Stated Nutrition Goals Pt will try to eat at least 2 servings fruit and 2 servings vegetables most days.    Dietary Outcomes   Daily # Fruits/Vegetables 1   Education   Learning Barrier Ready to learn   Cardiac Knowledge Total Score 9   Education Intervention   Education Schedule Given Yes Patient Education    Education Med compliance   Hypertension Yes   Is BP WDL Yes   Med(s) Change No   BP Meds metoprolol   ACE/ARB Prescribed No   ASA Prescribed Yes   ASA Adherent Yes   Antiplatelet Prescribed Yes   Antiplatelet Adherent Yes   BB Prescribed Yes   BB Adherent Yes   Statins Prescribed Yes   Statins Adherent Yes   Statin Intensity Moderate   Education Target Goals   Target Goals Medication compliance   Staff Treatment Goals   Goals Exercise; Functional capacity   Exercise Goal increase stamina and endurance   Exercise Goal Status Progressing   Exercise Goal Assessment Key functional changes

## 2023-02-17 NOTE — DISCHARGE INSTRUCTIONS
[address] [facility]  [phone]    Upon completion of your initial Phase II portion of cardiopulmonary rehabilitation, you will need to continue to exercise to maintain your present level of fitness obtained during Phase II to prevent any complications that may occur with lack of activity. This home exercise program is designed to help you achieve your maximum fitness level based on your accomplishments in Phase II. Following are some guidelines to help you exercise safely and effectively. Make exercise part of your daily routine  Do not exercise if you are not feeling well  Wear comfortable shoes and clothes made for exercise  Do not engage in vigorous activities for at least one hour after a heavy meal  Do not drink alcoholic or caffeinated beverages two hours before exercising  Avoid smoking one hour before and right after exercising, try to quit ASAP  Plan to exercise in a climate-controlled environment. Do not exercise outside if it's hotter than 80F, colder than 30F or greater than 75% humidity, and/or very windy  After exercise, take a brief warm shower. Avoid extremely hot or cold water  When exercising outdoors consider:  Letting someone know that you are exercising outdoors. If possible, exercise with someone  Have a plan of action of what to do if you do not feel well during exercise  Carry your identification; especially if you are exercising alone  Wear colors that are easily seen  When exercising in cold weather:  Dress to maintain body heat, but avoid overheating  Dress in layers  Wear a hat and gloves to prevent heat loss  A mask or scarf over your nose and mouth will warm the air as you breathe. Also, inhale through your nose  During your exercise program, if you experience any of the following symptoms STOP!!!  Chest tightness, discomfort, or pain.  If chest pain persists after two minutes, take nitroglycerine pills if prescribed as instructed, make sure you are seated. Light headedness or dizziness. Wait a maximum of two minutes and if there is no relief or an increase in symptoms, STOP and contact your physician IMMEDIATELY! Unusual shortness of breath  Nausea or vomiting   Leg pain or weakness  Palpitations  Irregular heartbeat  Pale, cool, clammy skin    Pulmonary Patients:   Make sure to use your inhaler before exercise and remember to carry your inhaler with you always. Remember to check your oxygen tank before exercising to be sure that you have enough. If you have pulse ox monitor take it with you as well. To get the most out of your home exercise program, you will need to follow the recommended guidelines set up by the staff at the [place] [address]. These guidelines are based on your present conditions. Based on your age, medication and ejection fraction (% of blood pumped from the heart), your personal target heart rate range is  bpm.   (this is what you have been working between 70---85 this is working at your [employer] HR Max under m2fx of [facility]). Frequency: According to ACSM guidelines, it is recommended to exercise most days of the week, maintaining 150 minutes or more of low to moderate intensity. This can be done in combination of aerobic (exercise increasing the cardiovascular system by exchanging oxygen) and dynamic (exercise involving movement of the joints to increase strength and stability). Intensity:  Low                                               Moderate                                                       High      Modalities Level METs   Airdyne Bike     Arm ergometer 3 3.1   Elliptical 1 - 2 3.5 - 3.5   Nu-Step 3-9 1.9 - 3.4   Recumbent Bike 5 - 6 40 Esqueda - 48 Esqueda   Rower 1 - 10 6.1 - 8.9    Treadmill 1. 5mph 0% - 3. 7mph 5% 2.4 - 6.4   Weights                  Heart Rate Conversions     10 second count Beats/Minute 10 Second Count Beats/Minute   10 60 21 126    1 66 22 132   12 72 23 138   13 78 24 144   14 84 25 150   15 90 26 156   16 96 27 162   17 102 28 168   18 108 29 174   19 114 30 180   20 120 31 186       Rate of Perceived Dyspnea   0 Nothing at all    .5 Extremely Slight   1 Very Slight   2 Slight   3 Moderate    4 Somewhat Severe   5 Severe   6    7 Very Severe   8    9 Extremely Severe    10 Maximal        Radha's Rate of Perceived Exertion   6 No Exertion   7 Extremely Light   8    9 Very Light   10    11 Light   12    13 Somewhat Hard   14    15 Hard (Heavy)   16    17 Very Hard   18    19 Extremely Hard   20 Maximal Exertion                          **RPD scale or rate of perceived dyspnea scale is a tool that can be used to determine how breathless you may feel while at rest or during exertion. It is common for your breathing to get heavier with exercise. Your RPD should not exceed a 5 while exercising. **  ** RPE scale or rate of perceived exertion is a tool that can be used to determine how hard you are exerting yourself during daily activities or working out. You should take caution when you feel that you are working at a 12 or higher and you should never work at a rate of 17 or higher.  **    Comments:  Keep up all your good work!!  :)             Approximate Energy Requirements of Selected Activities  Category Self or Home Care Occupational Recreational Physical Conditioning   VERY LIGHT  < 3 METS  <10mL/kg/min  <4kcals    1 MET= sitting and doing nothing Washing, shaving, dressing  Washing dishes  Driving automobile  Dusting, ironing  Shopping  Using hand tools  Kneading dough  Riding power mower    Sitting (clerical, assembling)  Standing (store clerking, tending bar)  Repairing radios and TVs  Desk work   Operating cranes Shuffleboard, darts  Horseshoes  Bait casting  Billiards, bowling  Archery, [employer] (cart)  Piano playing  Card playing  Horseback riding (walk)  Kayaking, rowing, canoeing (2.5 mph)   Walking (level at 2 mph)  Bicycling (5 mph)  Very light calisthenics  Range of motion exercising    LIGHT  3-5 METs   11-18mL/kg/min  4-6kcals Cleaning windows  Raking leaves  Weeding  Waxing floors (slowly)  Painting  Carrying objects (15-30 lbs)  Gardening, hoeing  Power saw  Pushing  Stocking shelves (light to medium weight)  Moderate walking   Light carpentry  Machine assembly   Auto repair Plastering/drywall  Putting in a sidewalk  Hitching trailers  [address] work  [address]  Repair/remodeling  Plowing/operating   Tractor  Driving trailer truck Dancing (social)  Golfing (walking)  Sailing  Horseback riding (trot)  Volleyball (6 person)  Archery  Table tennis  Softball (noncompetitive)  Ice boating  Kayaking, rowing, canoeing (3 mph) Walking (3-4 mph)  Bicycling (5 mph)  Light calisthenics   Light swimming  Stair climbing (moderate)  Moderate calisthenics    MODERATE  5-7 METs  18-25mL/kg/min  6-8kcals Easy digging in garden  Push mowing (level)   Carrying objects (30-60lbs)  Splitting wood Carpentry (exterior home building)  Shoveling dirt  Using pneumatic tools Badminton (competitive)  Tennis (singles)   Downhill skiing (light)  Backpacking (5 lbs)  Ice or roller skating  Horseback riding (post trot)  Softball (competitive)  Cross-country hiking   [employer], rowing, canoeing (4 mph) Walking (4-5 mph)  Bicycling (9-10 mph)  Swimming (breast stroke)   HEAVY-Not Recommended  7-9 METs  25-32 mL/kg/min  8-10kcals Sawing wood  Heavy shoveling   Climbing a ladder  Carrying heavy objects (60-90lbs)  Carrying 20lbs upstairs Hand sawing hardwood  Tending furnace  Digging ditches  Using pick and shovel  Using sledge hammer  Moving and pushing desks and filing cabinets  Laying railroad track [employer] climbing   [employer] riding (gallop)  Snow shoeing (3mph)  Sledding, tobogganing  Ice hockey  Paddle ball  Touch football  [place], rowing, canoeing (5mph)  Basketball (noncompetitive) Downhill skiing (vigorous)  Cross-country skiing (4 mph) Heavy calisthenics   Jogging (5mph)  Swimming (crawl)  Using rower machine  Walking (6mph)  Bicycling (11-12mph)  Stair climbing (fast)   VERY HEAVY-Not Recommended  >9 METs  >32 mL/kg/min  >10kcals Carrying Objects (>90lb)  Shoveling heavy snow  Shoveling 16lbs x10/min Logging   Heavy labor   Handball  Squash  Cross-country skiing  Basketball (competitive) Running (>6mph)  Bicycling (>13mph) or up a steep hill  Jumping rope

## 2023-03-08 ENCOUNTER — APPOINTMENT (OUTPATIENT)
Dept: ULTRASOUND IMAGING | Age: 68
DRG: 419 | End: 2023-03-08
Payer: MEDICARE

## 2023-03-08 ENCOUNTER — APPOINTMENT (OUTPATIENT)
Dept: GENERAL RADIOLOGY | Age: 68
DRG: 419 | End: 2023-03-08
Payer: MEDICARE

## 2023-03-08 LAB
ALBUMIN SERPL-MCNC: 4 G/DL (ref 3.5–5.2)
ALP BLD-CCNC: 482 U/L (ref 40–129)
ALT SERPL-CCNC: 260 U/L (ref 0–40)
ANION GAP SERPL CALCULATED.3IONS-SCNC: 14 MMOL/L (ref 7–16)
AST SERPL-CCNC: 146 U/L (ref 0–39)
BASOPHILS ABSOLUTE: 0 E9/L (ref 0–0.2)
BASOPHILS RELATIVE PERCENT: 0.2 % (ref 0–2)
BILIRUB SERPL-MCNC: 4.6 MG/DL (ref 0–1.2)
BUN BLDV-MCNC: 22 MG/DL (ref 6–23)
BURR CELLS: ABNORMAL
CALCIUM SERPL-MCNC: 9.5 MG/DL (ref 8.6–10.2)
CHLORIDE BLD-SCNC: 101 MMOL/L (ref 98–107)
CO2: 23 MMOL/L (ref 22–29)
CREAT SERPL-MCNC: 1.1 MG/DL (ref 0.7–1.2)
EOSINOPHILS ABSOLUTE: 0 E9/L (ref 0.05–0.5)
EOSINOPHILS RELATIVE PERCENT: 0 % (ref 0–6)
GFR SERPL CREATININE-BSD FRML MDRD: >60 ML/MIN/1.73
GLUCOSE BLD-MCNC: 148 MG/DL (ref 74–99)
HCT VFR BLD CALC: 42.4 % (ref 37–54)
HEMOGLOBIN: 14.2 G/DL (ref 12.5–16.5)
INFLUENZA A BY PCR: NOT DETECTED
INFLUENZA B BY PCR: NOT DETECTED
LACTIC ACID: 1.8 MMOL/L (ref 0.5–2.2)
LIPASE: 27 U/L (ref 13–60)
LYMPHOCYTES ABSOLUTE: 0.11 E9/L (ref 1.5–4)
LYMPHOCYTES RELATIVE PERCENT: 0.9 % (ref 20–42)
MCH RBC QN AUTO: 30.1 PG (ref 26–35)
MCHC RBC AUTO-ENTMCNC: 33.5 % (ref 32–34.5)
MCV RBC AUTO: 90 FL (ref 80–99.9)
MONOCYTES ABSOLUTE: 0.44 E9/L (ref 0.1–0.95)
MONOCYTES RELATIVE PERCENT: 3.5 % (ref 2–12)
NEUTROPHILS ABSOLUTE: 10.46 E9/L (ref 1.8–7.3)
NEUTROPHILS RELATIVE PERCENT: 95.6 % (ref 43–80)
OVALOCYTES: ABNORMAL
PDW BLD-RTO: 15.3 FL (ref 11.5–15)
PLATELET # BLD: 150 E9/L (ref 130–450)
PMV BLD AUTO: 9.9 FL (ref 7–12)
POIKILOCYTES: ABNORMAL
POLYCHROMASIA: ABNORMAL
POTASSIUM SERPL-SCNC: 3.5 MMOL/L (ref 3.5–5)
RBC # BLD: 4.71 E12/L (ref 3.8–5.8)
SARS-COV-2, NAAT: NOT DETECTED
SODIUM BLD-SCNC: 138 MMOL/L (ref 132–146)
TOTAL PROTEIN: 6.9 G/DL (ref 6.4–8.3)
TROPONIN, HIGH SENSITIVITY: 15 NG/L (ref 0–11)
WBC # BLD: 10.9 E9/L (ref 4.5–11.5)

## 2023-03-08 PROCEDURE — 80053 COMPREHEN METABOLIC PANEL: CPT

## 2023-03-08 PROCEDURE — 87635 SARS-COV-2 COVID-19 AMP PRB: CPT

## 2023-03-08 PROCEDURE — 93005 ELECTROCARDIOGRAM TRACING: CPT | Performed by: PHYSICIAN ASSISTANT

## 2023-03-08 PROCEDURE — 85025 COMPLETE CBC W/AUTO DIFF WBC: CPT

## 2023-03-08 PROCEDURE — 84484 ASSAY OF TROPONIN QUANT: CPT

## 2023-03-08 PROCEDURE — 71046 X-RAY EXAM CHEST 2 VIEWS: CPT

## 2023-03-08 PROCEDURE — 36415 COLL VENOUS BLD VENIPUNCTURE: CPT

## 2023-03-08 PROCEDURE — 83605 ASSAY OF LACTIC ACID: CPT

## 2023-03-08 PROCEDURE — 87502 INFLUENZA DNA AMP PROBE: CPT

## 2023-03-08 PROCEDURE — 99285 EMERGENCY DEPT VISIT HI MDM: CPT

## 2023-03-08 PROCEDURE — 83690 ASSAY OF LIPASE: CPT

## 2023-03-08 PROCEDURE — 76705 ECHO EXAM OF ABDOMEN: CPT

## 2023-03-08 PROCEDURE — 82248 BILIRUBIN DIRECT: CPT

## 2023-03-08 PROCEDURE — 6370000000 HC RX 637 (ALT 250 FOR IP): Performed by: PHYSICIAN ASSISTANT

## 2023-03-08 RX ORDER — ONDANSETRON 4 MG/1
4 TABLET, ORALLY DISINTEGRATING ORAL ONCE
Status: COMPLETED | OUTPATIENT
Start: 2023-03-08 | End: 2023-03-08

## 2023-03-08 RX ADMIN — ONDANSETRON 4 MG: 4 TABLET, ORALLY DISINTEGRATING ORAL at 16:49

## 2023-03-08 ASSESSMENT — PAIN - FUNCTIONAL ASSESSMENT
PAIN_FUNCTIONAL_ASSESSMENT: NONE - DENIES PAIN
PAIN_FUNCTIONAL_ASSESSMENT: NONE - DENIES PAIN

## 2023-03-08 NOTE — ED NOTES
Department of Emergency Medicine  FIRST PROVIDER TRIAGE NOTE             Independent MLP           3/8/23  3:31 PM EST    Date of Encounter: 3/8/23   MRN: [id number]      HPI: Benoit Floyd is a 76 y.o. male who presents to the ED for Emesis (Emesis while driving today, denies cp/sob)   Vomiting x several hours ago. No CP/SOB. No abdominal pain. Had an episode like this 3 days ago which resolved itself. Overall feeling weak. ROS: Negative for cp or sob. PE: Gen Appearance/Constitutional: alert  CV: regular rate  Pulm: CTA bilat     Initial Plan of Care: All treatment areas with department are currently occupied. Plan to order/Initiate the following while awaiting opening in ED: labs, EKG, and imaging studies.   Initiate Treatment-Testing, Proceed toTreatment Area When Bed Available for ED Attending/MLP to Continue Care    Electronically signed by FRANSISCO More   DD: 3/8/23       Yecenia More  03/08/23 [id number]

## 2023-03-09 ENCOUNTER — APPOINTMENT (OUTPATIENT)
Dept: CT IMAGING | Age: 68
DRG: 419 | End: 2023-03-09
Payer: MEDICARE

## 2023-03-09 ENCOUNTER — HOSPITAL ENCOUNTER (INPATIENT)
Age: 68
LOS: 2 days | Discharge: HOME | DRG: 419 | End: 2023-03-11
Attending: STUDENT IN AN ORGANIZED HEALTH CARE EDUCATION/TRAINING PROGRAM | Admitting: FAMILY MEDICINE
Payer: MEDICARE

## 2023-03-09 DIAGNOSIS — R11.2 NAUSEA AND VOMITING, UNSPECIFIED VOMITING TYPE: ICD-10-CM

## 2023-03-09 DIAGNOSIS — M54.50 ACUTE BILATERAL LOW BACK PAIN WITHOUT SCIATICA: ICD-10-CM

## 2023-03-09 DIAGNOSIS — K80.50 CHOLEDOCHOLITHIASIS: Primary | ICD-10-CM

## 2023-03-09 DIAGNOSIS — K80.42 CHOLEDOCHOLITHIASIS WITH ACUTE CHOLECYSTITIS: ICD-10-CM

## 2023-03-09 PROBLEM — Z01.810 PREOP CARDIOVASCULAR EXAM: Status: ACTIVE | Noted: 2023-03-09

## 2023-03-09 PROBLEM — Z01.818 PREOPERATIVE CLEARANCE: Status: ACTIVE | Noted: 2023-03-09

## 2023-03-09 LAB
ALBUMIN SERPL-MCNC: 3.7 G/DL (ref 3.5–5.2)
ALP BLD-CCNC: 411 U/L (ref 40–129)
ALT SERPL-CCNC: 238 U/L (ref 0–40)
AST SERPL-CCNC: 140 U/L (ref 0–39)
BILIRUB SERPL-MCNC: 5.1 MG/DL (ref 0–1.2)
BILIRUBIN DIRECT: 2 MG/DL (ref 0–0.3)
BILIRUBIN DIRECT: 2.8 MG/DL (ref 0–0.3)
BILIRUBIN, INDIRECT: 3.1 MG/DL (ref 0–1)
EKG ATRIAL RATE: 97 BPM
EKG P AXIS: 66 DEGREES
EKG P-R INTERVAL: 136 MS
EKG Q-T INTERVAL: 362 MS
EKG QRS DURATION: 86 MS
EKG QTC CALCULATION (BAZETT): 459 MS
EKG R AXIS: -36 DEGREES
EKG T AXIS: 56 DEGREES
EKG VENTRICULAR RATE: 97 BPM
TOTAL PROTEIN: 6.8 G/DL (ref 6.4–8.3)
TROPONIN, HIGH SENSITIVITY: 11 NG/L (ref 0–11)

## 2023-03-09 PROCEDURE — 93010 ELECTROCARDIOGRAM REPORT: CPT | Performed by: INTERNAL MEDICINE

## 2023-03-09 PROCEDURE — 6360000004 HC RX CONTRAST MEDICATION: Performed by: RADIOLOGY

## 2023-03-09 PROCEDURE — 80076 HEPATIC FUNCTION PANEL: CPT

## 2023-03-09 PROCEDURE — 36415 COLL VENOUS BLD VENIPUNCTURE: CPT

## 2023-03-09 PROCEDURE — 74177 CT ABD & PELVIS W/CONTRAST: CPT

## 2023-03-09 PROCEDURE — APPSS60 APP SPLIT SHARED TIME 46-60 MINUTES: Performed by: PHYSICIAN ASSISTANT

## 2023-03-09 PROCEDURE — 99223 1ST HOSP IP/OBS HIGH 75: CPT | Performed by: SURGERY

## 2023-03-09 PROCEDURE — 1200000000 HC SEMI PRIVATE

## 2023-03-09 PROCEDURE — 2580000003 HC RX 258

## 2023-03-09 PROCEDURE — 6370000000 HC RX 637 (ALT 250 FOR IP): Performed by: FAMILY MEDICINE

## 2023-03-09 PROCEDURE — 2580000003 HC RX 258: Performed by: FAMILY MEDICINE

## 2023-03-09 PROCEDURE — 6360000002 HC RX W HCPCS: Performed by: FAMILY MEDICINE

## 2023-03-09 PROCEDURE — 99254 IP/OBS CNSLTJ NEW/EST MOD 60: CPT | Performed by: SURGERY

## 2023-03-09 PROCEDURE — 99223 1ST HOSP IP/OBS HIGH 75: CPT | Performed by: INTERNAL MEDICINE

## 2023-03-09 PROCEDURE — 2580000003 HC RX 258: Performed by: RADIOLOGY

## 2023-03-09 PROCEDURE — 6360000002 HC RX W HCPCS

## 2023-03-09 RX ORDER — ACETAMINOPHEN 650 MG/1
650 SUPPOSITORY RECTAL EVERY 6 HOURS PRN
Status: DISCONTINUED | OUTPATIENT
Start: 2023-03-09 | End: 2023-03-11 | Stop reason: HOSPADM

## 2023-03-09 RX ORDER — VIT C/B6/B5/MAGNESIUM/HERB 173 50-5-6-5MG
1000 CAPSULE ORAL DAILY
COMMUNITY

## 2023-03-09 RX ORDER — ACETAMINOPHEN 325 MG/1
650 TABLET ORAL EVERY 6 HOURS PRN
Status: DISCONTINUED | OUTPATIENT
Start: 2023-03-09 | End: 2023-03-11 | Stop reason: HOSPADM

## 2023-03-09 RX ORDER — SODIUM CHLORIDE, SODIUM LACTATE, POTASSIUM CHLORIDE, CALCIUM CHLORIDE 600; 310; 30; 20 MG/100ML; MG/100ML; MG/100ML; MG/100ML
INJECTION, SOLUTION INTRAVENOUS CONTINUOUS
Status: DISCONTINUED | OUTPATIENT
Start: 2023-03-09 | End: 2023-03-10 | Stop reason: SDUPTHER

## 2023-03-09 RX ORDER — SODIUM CHLORIDE 0.9 % (FLUSH) 0.9 %
10 SYRINGE (ML) INJECTION PRN
Status: COMPLETED | OUTPATIENT
Start: 2023-03-09 | End: 2023-03-09

## 2023-03-09 RX ORDER — PROMETHAZINE HYDROCHLORIDE 12.5 MG/1
12.5 TABLET ORAL EVERY 6 HOURS PRN
Status: DISCONTINUED | OUTPATIENT
Start: 2023-03-09 | End: 2023-03-11 | Stop reason: HOSPADM

## 2023-03-09 RX ORDER — SODIUM CHLORIDE 9 MG/ML
INJECTION, SOLUTION INTRAVENOUS PRN
Status: DISCONTINUED | OUTPATIENT
Start: 2023-03-09 | End: 2023-03-11 | Stop reason: HOSPADM

## 2023-03-09 RX ORDER — ENOXAPARIN SODIUM 100 MG/ML
40 INJECTION SUBCUTANEOUS DAILY
Status: DISCONTINUED | OUTPATIENT
Start: 2023-03-09 | End: 2023-03-11 | Stop reason: HOSPADM

## 2023-03-09 RX ORDER — CLOPIDOGREL BISULFATE 75 MG/1
75 TABLET ORAL DAILY
Status: DISCONTINUED | OUTPATIENT
Start: 2023-03-09 | End: 2023-03-11 | Stop reason: HOSPADM

## 2023-03-09 RX ORDER — GLIPIZIDE 5 MG/1
2.5 TABLET ORAL
Status: DISCONTINUED | OUTPATIENT
Start: 2023-03-10 | End: 2023-03-11 | Stop reason: HOSPADM

## 2023-03-09 RX ORDER — POLYETHYLENE GLYCOL 3350 17 G/17G
17 POWDER, FOR SOLUTION ORAL DAILY PRN
Status: DISCONTINUED | OUTPATIENT
Start: 2023-03-09 | End: 2023-03-11 | Stop reason: HOSPADM

## 2023-03-09 RX ORDER — SODIUM CHLORIDE 0.9 % (FLUSH) 0.9 %
10 SYRINGE (ML) INJECTION PRN
Status: DISCONTINUED | OUTPATIENT
Start: 2023-03-09 | End: 2023-03-11 | Stop reason: HOSPADM

## 2023-03-09 RX ORDER — ONDANSETRON 2 MG/ML
4 INJECTION INTRAMUSCULAR; INTRAVENOUS EVERY 6 HOURS PRN
Status: DISCONTINUED | OUTPATIENT
Start: 2023-03-09 | End: 2023-03-11 | Stop reason: HOSPADM

## 2023-03-09 RX ORDER — METOPROLOL SUCCINATE 100 MG/1
100 TABLET, EXTENDED RELEASE ORAL DAILY
Status: DISCONTINUED | OUTPATIENT
Start: 2023-03-09 | End: 2023-03-11 | Stop reason: HOSPADM

## 2023-03-09 RX ORDER — ATORVASTATIN CALCIUM 80 MG/1
80 TABLET, FILM COATED ORAL DAILY
Status: DISCONTINUED | OUTPATIENT
Start: 2023-03-09 | End: 2023-03-11 | Stop reason: HOSPADM

## 2023-03-09 RX ORDER — AMLODIPINE BESYLATE 5 MG/1
5 TABLET ORAL DAILY
Status: DISCONTINUED | OUTPATIENT
Start: 2023-03-09 | End: 2023-03-11 | Stop reason: HOSPADM

## 2023-03-09 RX ORDER — SODIUM CHLORIDE 0.9 % (FLUSH) 0.9 %
10 SYRINGE (ML) INJECTION EVERY 12 HOURS SCHEDULED
Status: DISCONTINUED | OUTPATIENT
Start: 2023-03-09 | End: 2023-03-11 | Stop reason: HOSPADM

## 2023-03-09 RX ORDER — ASPIRIN 81 MG/1
81 TABLET, CHEWABLE ORAL DAILY
Status: DISCONTINUED | OUTPATIENT
Start: 2023-03-09 | End: 2023-03-11 | Stop reason: HOSPADM

## 2023-03-09 RX ORDER — M-VIT,TX,IRON,MINS/CALC/FOLIC 27MG-0.4MG
1 TABLET ORAL DAILY
COMMUNITY

## 2023-03-09 RX ORDER — SODIUM CHLORIDE 9 MG/ML
INJECTION, SOLUTION INTRAVENOUS CONTINUOUS
Status: DISCONTINUED | OUTPATIENT
Start: 2023-03-09 | End: 2023-03-10

## 2023-03-09 RX ADMIN — ONDANSETRON 4 MG: 2 INJECTION INTRAMUSCULAR; INTRAVENOUS at 19:30

## 2023-03-09 RX ADMIN — AMLODIPINE BESYLATE 5 MG: 5 TABLET ORAL at 10:33

## 2023-03-09 RX ADMIN — ATORVASTATIN CALCIUM 80 MG: 80 TABLET, FILM COATED ORAL at 10:33

## 2023-03-09 RX ADMIN — SODIUM CHLORIDE, POTASSIUM CHLORIDE, SODIUM LACTATE AND CALCIUM CHLORIDE: 600; 310; 30; 20 INJECTION, SOLUTION INTRAVENOUS at 06:13

## 2023-03-09 RX ADMIN — SODIUM CHLORIDE, POTASSIUM CHLORIDE, SODIUM LACTATE AND CALCIUM CHLORIDE: 600; 310; 30; 20 INJECTION, SOLUTION INTRAVENOUS at 19:31

## 2023-03-09 RX ADMIN — IOPAMIDOL 75 ML: 755 INJECTION, SOLUTION INTRAVENOUS at 01:28

## 2023-03-09 RX ADMIN — MEROPENEM 2000 MG: 1 INJECTION, POWDER, FOR SOLUTION INTRAVENOUS at 05:37

## 2023-03-09 RX ADMIN — Medication 10 ML: at 01:30

## 2023-03-09 RX ADMIN — METOPROLOL SUCCINATE 100 MG: 100 TABLET, EXTENDED RELEASE ORAL at 10:33

## 2023-03-09 RX ADMIN — Medication 10 ML: at 12:16

## 2023-03-09 RX ADMIN — SODIUM CHLORIDE: 9 INJECTION, SOLUTION INTRAVENOUS at 12:13

## 2023-03-09 ASSESSMENT — PAIN - FUNCTIONAL ASSESSMENT: PAIN_FUNCTIONAL_ASSESSMENT: NONE - DENIES PAIN

## 2023-03-09 NOTE — CONSULTS
INPATIENT CARDIOLOGY CONSULT     Reason for Consult: Cardiac risk stratification prior to ERCP    Cardiologist: Dr. Catalina Newman    Requesting Physician: Dr. Mckayla Roblero    Date of Consultation: 3/9/2023    HISTORY OF PRESENT ILLNESS:   Patient is a 76year old WM new to [facility]. He currently follows with [facility] and Dr. Temo Malave for his cardiology care. He is being seen in consultation this hospital admission by Dr. Catalina Newman for cardiac risk stratification prior to ERCP. PMH: HTN, HLD, VHD, CAD s/p CABG x 4 October 2022 at [facility], T2DM, renal mass s/p R partial nephrectomy 2018, CKD, ED on Viagra, and history of SVT    Patient presented to [facility] on March 8, 2023 with complaints of abdominal pain with nausea and emesis. Per patient, beginning this past Sunday, he noticed an episode of right-sided abdominal pain with associated diarrhea, fever/chills and nausea with emesis. He states after the episode of emesis, he felt improved and thus did not seek medical care at that time. He felt well on Monday and Tuesday, and went to work without issue. However, yesterday afternoon, he began to notice recurrence of the above-noted symptoms, thus prompting his ED presentation. He is currently asymptomatic. Denies ever having chest pain, dyspnea, diaphoresis, palpitations, dizziness, near-syncope, syncope, PND, orthopnea or peripheral edema. He states he has been doing well since his CABG surgery in October 2022. He is active for his age, and walks with his wife on a weekly basis without complaints of chest pain or shortness of breath. He is able to complete > 4 METS of activity without angina or anginal equivalent symptoms. He admits to medication compliance. Please note: past medical records were reviewed per electronic medical record (EMR) - see detailed reports under Past Medical/ Surgical History.    PAST MEDICAL HISTORY:    CAD s/p CABG x 4 (LIMA-LAD, LRA-OM-1, SVG-diagonal, SVG-acute marginal) at [facility] 10/2022  NSTEMI 10/2022 at [facility] with cardiac catheterization as noted below revealing severe heavily calcified multivessel CAD  VHD  HTN  HLD, on statin therapy  Non-insulin requiring T2DM  Renal mass s/p right partial nephrectomy in 2018  CKD III baseline SCr 1-1.3  ED, on Viagra   History of SVT 2013      CARDIAC TESTING    [facility] 10/4/2022, [address]  Right femoral artery was closed with Angio-Seal.   Patient tolerated procedure well and is returned to his room in a stable   condition. Findings hemodynamic data revealed a left ventricular end-diastolic   pressure of 18 mmHg. There was no gradient across the aortic valve at the time of pullback   pressures. The coronary angiography revealed the following findings   1. The left main coronary artery is normal.  2. The left anterior descending coronary artery revealed subtotal stenosis   in the proximal 3rd followed by severe diffuse disease with subtotal   lesions in multiple segments involving the mid segment. Distal most LAD   appeared to be 2 mm vessel and appeared fairly normal involving the distal   3rd. The diagonal branches revealed plaque disease. 3. Left circumflex coronary artery is a large dominant vessel with   moderate to severe disease involving the distal most segment. First   obtuse marginal branch revealed subtotal stenosis involving the proximal   segment with good distal runoff. The 2nd obtuse marginal branch which is   fairly small and had moderately severe diffuse disease. 4. Right coronary artery is a small non dominant vessel with subtotal   stenosis involving the mid segment. The left ventriculography revealed normal sized chamber with fairly good   overall systolic left ventricular function. Estimated ejection fraction   is about 55%. The mitral appeared normal with no significant mitral regurgitation.   Impression   severe triple-vessel coronary disease with subtotal stenosis of the LAD involving the proximal and mid segments Subtotal stenosis of   the 1st obtuse marginal branch of the circumflex coronary artery   Subtotal stenosis of the mid segment of a non dominant right coronary   artery   Normal systolic left ventricular function. Recommendation the patient will have aggressive medical management and the he will be referred for urgent coronary bypass surgery soon. [place]    Definity ultrasound enhancing agent used. Normal left ventricular systolic function. 2D Gaviria's biplane EF 57%. Grade I (mild) left ventricular diastolic dysfunction. Segmental wall   motion abnormalities as described in the diagram below. Cannot rule out   mild hypokineis of the apex. Mild-moderate aortic valve regurgitation. The RVSP is estimated at 20 mmHg. TTE, 10/8/2022 [facility]  CONCLUSIONS:   - Technically difficult exam due to suboptimal positioning and body habitus. - Exam indication: CAD   - The left ventricle is normal in size. Left ventricular systolic function is   normal. EF = 55 ± 5% (2D biplane) Grade I left ventricular diastolic dysfunction.   - The right ventricle is normal in size. Right ventricular systolic function is   normal.   -Trace MR. Mild MAC. Trace TR. Mild 1+ AI.  - The patient has not had a prior CC echocardiographic exam for comparison. TTE, 11/23/2022 [facility]  CONCLUSIONS:   - Exam indication: CAD. Technically difficult study. - The left ventricle is normal in size. Left ventricular systolic function is   normal. EF = 53 ± 5% (2D 4-ch.) Grade I left ventricular diastolic dysfunction.   - The right ventricle is normal in size. Right ventricular systolic function is   normal.   -Trace MR. Trace to 1+ TR. Mild 1+ AI. Trace to 1+ AL.  - Exam was compared with the prior CC echocardiographic exam performed on   10/8/2022. Similar findings.     PAST SURGICAL HISTORY:    Past Surgical History:   Procedure Laterality Date    COLONOSCOPY      FOOT SURGERY  2013    FRACTURE SURGERY HIP SURGERY Left     rebuilt fell off tractor    KIDNEY BIOPSY Right 11/07/2016    PATELLA SURGERY  2004    TONSILLECTOMY         HOME MEDICATIONS:  Prior to Admission medications    Medication Sig Start Date End Date Taking?  Authorizing Provider   metoprolol succinate (TOPROL XL) 100 MG extended release tablet TAKE ONE TABLET BY MOUTH EVERY DAY 12/19/22   Historical Provider, MD   amLODIPine (NORVASC) 5 MG tablet TAKE ONE TABLET BY MOUTH EVERY DAY 12/8/22   Historical Provider, MD   atorvastatin (LIPITOR) 80 MG tablet TAKE ONE TABLET BY MOUTH DAILY 11/11/22   Historical Provider, MD   metoprolol tartrate (LOPRESSOR) 25 MG tablet TAKE 2 TABLETS BY MOUTH TWICE DAILY 11/18/22   Historical Provider, MD   nitroGLYCERIN (NITROSTAT) 0.4 MG SL tablet Place 0.4 mg under the tongue every 5 minutes as needed 10/6/22 10/1/23  Historical Provider, MD   sildenafil (VIAGRA) 50 MG tablet TAKE 1 TABLET BY MOUTH IF NEEDED AS DIRECTED. 12/1/22   Historical Provider, MD   glipiZIDE (GLUCOTROL XL) 2.5 MG extended release tablet Take 1 tablet by mouth daily 12/8/22   Mirna Kolb MD   clopidogrel (PLAVIX) 75 MG tablet Take 75 mg by mouth daily    Historical Provider, MD   aspirin 81 MG tablet Take 81 mg by mouth daily     Historical Provider, MD       CURRENT MEDICATIONS:      Current Facility-Administered Medications:     lactated ringers IV soln infusion, , IntraVENous, Continuous, Vinnie Leonardo MD, Last Rate: 100 mL/hr at 03/09/23 0613, New Bag at 03/09/23 0613    Current Outpatient Medications:     metoprolol succinate (TOPROL XL) 100 MG extended release tablet, TAKE ONE TABLET BY MOUTH EVERY DAY, Disp: , Rfl:     amLODIPine (NORVASC) 5 MG tablet, TAKE ONE TABLET BY MOUTH EVERY DAY, Disp: , Rfl:     atorvastatin (LIPITOR) 80 MG tablet, TAKE ONE TABLET BY MOUTH DAILY, Disp: , Rfl:     metoprolol tartrate (LOPRESSOR) 25 MG tablet, TAKE 2 TABLETS BY MOUTH TWICE DAILY, Disp: , Rfl:     nitroGLYCERIN (NITROSTAT) 0.4 MG SL tablet, Place 0.4 mg under the tongue every 5 minutes as needed, Disp: , Rfl:     sildenafil (VIAGRA) 50 MG tablet, TAKE 1 TABLET BY MOUTH IF NEEDED AS DIRECTED., Disp: , Rfl:     glipiZIDE (GLUCOTROL XL) 2.5 MG extended release tablet, Take 1 tablet by mouth daily, Disp: 30 tablet, Rfl: 3    clopidogrel (PLAVIX) 75 MG tablet, Take 75 mg by mouth daily, Disp: , Rfl:     aspirin 81 MG tablet, Take 81 mg by mouth daily , Disp: , Rfl:     ALLERGIES:  Bee venom, Penicillins, and Adhesive tape    SOCIAL HISTORY:    Denies former/current tobacco, alcohol or illicit drug use. Does not require assistance with ambulation. Works as a . FAMILY HISTORY:   Father with history of CHF. Denies any other pertinent cardiac family medical history to me at this time. REVIEW OF SYSTEMS:     Negative except as noted above in HPI      PHYSICAL EXAM:   /74   Pulse 75   Temp 98.3 °F (36.8 °C) (Oral)   Resp 18   Wt 189 lb (85.7 kg)   SpO2 98%   BMI 27.91 kg/m²   CONST:  Well developed, well nourished WM who appears stated age. Awake, alert, cooperative, no apparent distress. HEENT:   Head- Normocephalic, atraumatic. Eyes- Conjunctivae pink, anicteric. Neck-  No stridor, trachea midline, no apparent jugular venous distention. CHEST: Chest symmetrical and non-tender to palpation. No accessory muscle use or intercostal retractions. RESPIRATORY: Lung sounds - clear throughout fields. No wheezing, rales or rhonchi. CARDIOVASCULAR:     No noted carotid bruit. Heart Ausculation- Regular rate and rhythm, no apparent murmur. PV: No lower extremity edema. No varicosities. Pedal pulses palpable, no clubbing or cyanosis. ABDOMEN: Soft, tender to light palpation. Bowel sounds present. MS: Good muscle strength and tone. No atrophy or abnormal movements. SKIN: Warm and dry. NEURO / PSYCH: Oriented to person, place and time. Speech clear and appropriate. Follows all commands. Pleasant affect.       DATA:    Telemetry:  with HR in the 70s    Diagnostic:  All diagnostic testing and lab work thus far this admission reviewed in detail. CXR 3/8/2023  Impression:  No acute process. US Gallbladder 3/8/2023  Impression:  Cholelithiasis in an otherwise normal-appearing gallbladder. Normal common  bile duct. CT Abdomen/Pelvis 3/8/2023  Impression  1. Choledocholithiasis with a single small punctate nonobstructive calculus  in the distal common bile duct. 2. Numerous calcifications are seen related to cholelithiasis, though without  pericholecystic inflammatory change to suggest cholecystitis. 3. Dependent airspace disease in the lung bases bilaterally, likely related  to atelectasis. 4. Diverticulosis. 5. Other nonacute incidental findings as above.     Labs:   CBC:   Recent Labs     03/08/23  1636   WBC 10.9   HGB 14.2   HCT 42.4        BMP:   Recent Labs     03/08/23  1636      K 3.5   CO2 23   BUN 22   CREATININE 1.1   LABGLOM >60   CALCIUM 9.5     HgA1c:   Lab Results   Component Value Date    LABA1C 6.7 (H) 02/15/2020     FASTING LIPID PANEL:  Lab Results   Component Value Date/Time    CHOL 91 12/06/2022 07:41 AM    HDL 29 12/06/2022 07:41 AM    LDLCALC 38 12/06/2022 07:41 AM    TRIG 119 12/06/2022 07:41 AM     LIVER PROFILE:  Recent Labs     03/08/23  1636 03/09/23  0438   * 140*   * 238*   LABALBU 4.0 3.7     Component Ref Range & Units 3/8/23 2335   Bilirubin, Direct 0.0 - 0.3 mg/dL 2.8 High       Component Ref Range & Units 3/8/23 2335 3/8/23 1636   Troponin, High Sensitivity 0 - 11 ng/L 11  15 High  CM      Component Ref Range & Units 3/8/23 1636   Influenza A by PCR Not Detected Not Detected    Influenza B by PCR Not Detected Not Detected      Component Ref Range & Units 3/8/23 1636   SARS-CoV-2, NAAT Not Detected Not Detected      Component Ref Range & Units 3/8/23 1636   Lactic Acid 0.5 - 2.2 mmol/L 1.8      Component Ref Range & Units 3/8/23 1636   Lipase 13 - 60 U/L 27 Assessment and plan to follow as per Dr. Jo Cons. NOTE: This report was transcribed using voice recognition software. Every effort was made to ensure accuracy; however, inadvertent computerized transcription errors may be present. Rubina Fuller, [address] [place] [address], [place] [address] Cardiology    Electronically signed by Juan Mejia PA-C on 3/9/2023 at 7:37 AM      I personally and independently saw and examined patient and reviewed all done pertinent laboratory data, imaging studies, ECGs and rhythm strips. I have reviewed and agree with the APN history and physical exam as documented in the above note.     Electronically signed by [place] Durbin MD on 3/9/2023 at 10:42 AM     We were asked to see the patient for Cardiac risk stratification prior to ERCP    IMPRESSION:  Patient is at a low risk from cardiac stand point for ERCP: daily activity exceeds 4 METS without any cardiac symptoms  Choledocholithiasis with evidence of obstruction   Elevated liver enzymes secondary to # 2  CAD with history of NSTEMI and CABG 10/2022  Mild AI on TTE 11/2022  History of SVT  HTN  HLD  Non-insulin T2DM  Renal mass s/p right partial nephrectomy in 2018  CKD  ED, on Viagra      PLAN:   May proceed with ERCP without the need for further cardiac testing  May hold ASA and Plavix prior to the procedure if need be  Would hold statin therapy in view of elevated LFTs until after procedure and once LFTs start to down trend  Will continue with Toprol-XL without interruption  Continue Norvasc as BP tolerates   Cardiology will sign off, please call if needed        Electronically signed by [place] Durbin MD on 3/9/2023 at 10:42 AM

## 2023-03-09 NOTE — CONSULTS
GENERAL SURGERY  CONSULT NOTE  3/9/2023    Physician Consulted: Dr. Becky Castaneda  Reason for Consult: choledocholithiasis   Referring Physician: Dr. Robert Vergara    HU Garcia is a 76 y.o. male hx of CAD s/p CABG 10/2022, benign renal mass s/p R partial nephrectomy, and DM who presented to the ED for evaluation of RUQ abdominal pain, nausea, and vomiting. General surgery was consulted for choledocholithiasis. The patient states he had acute onset of RUQ abdominal pain, chills, and multiple episodes of non bloody, nonbilious emesis while driving Wednesday, 3/8, afternoon, radiating to his mid back. The pain lasted a few hours so he called EMS for further evaluation. The patient states he had a similar episode a few days prior on Sunday however the episode last for a shorter period of time. He denies any other previous episodes of the RUQ pain. During my evaluation the patient reports resolution of symptoms including no more pain or nausea. He denies noticing any jaundice prior to today. He did notice darkening of his urine for the past couple days since the initial episode on Sunday. He reports a recent history of an MI s/p CABG done at [facility] in October 2022 and was started on Plavix. He takes both ASA and Plavix daily, most recent dose 3/8. He states he was also diagnosed with diabetes at that time. He denies any unexplained weight loss, night sweats, or unexplained fever. Denies hx of tobacco or EtOH abuse. Tbili 4.6, direct bilirubin pending. Alk phos 482. AST//260. LFTs were WNL in December. RUQ US showing multiple gallstones, normal CBD. CTAP showing cholelithiasis and small distal stone in the CBD. WBC 10.9. Hemodynamically stable in the ED.        Past Medical History:   Diagnosis Date    CAD (coronary artery disease)     Hyperglycemia     Hyperlipidemia     Hypertension     Prolonged emergence from general anesthesia     Renal mass     Supraventricular tachycardia [facility])        Past Surgical History:   Procedure Laterality Date    COLONOSCOPY      FOOT SURGERY  2013    FRACTURE SURGERY      HIP SURGERY Left     sharifa fell off tractor    KIDNEY BIOPSY Right 11/07/2016    PATELLA SURGERY  2004    TONSILLECTOMY         Medications Prior to Admission:    Prior to Admission medications    Medication Sig Start Date End Date Taking? Authorizing Provider   metoprolol succinate (TOPROL XL) 100 MG extended release tablet TAKE ONE TABLET BY MOUTH EVERY DAY 12/19/22   Historical Provider, MD   amLODIPine (NORVASC) 5 MG tablet TAKE ONE TABLET BY MOUTH EVERY DAY 12/8/22   Historical Provider, MD   atorvastatin (LIPITOR) 80 MG tablet TAKE ONE TABLET BY MOUTH DAILY 11/11/22   Historical Provider, MD   metoprolol tartrate (LOPRESSOR) 25 MG tablet TAKE 2 TABLETS BY MOUTH TWICE DAILY 11/18/22   Historical Provider, MD   nitroGLYCERIN (NITROSTAT) 0.4 MG SL tablet Place 0.4 mg under the tongue every 5 minutes as needed 10/6/22 10/1/23  Historical Provider, MD   sildenafil (VIAGRA) 50 MG tablet TAKE 1 TABLET BY MOUTH IF NEEDED AS DIRECTED. 12/1/22   Historical Provider, MD   glipiZIDE (GLUCOTROL XL) 2.5 MG extended release tablet Take 1 tablet by mouth daily 12/8/22   Steffany MD Kaushik   clopidogrel (PLAVIX) 75 MG tablet Take 75 mg by mouth daily    Historical Provider, MD   aspirin 81 MG tablet Take 81 mg by mouth daily     Historical Provider, MD       Allergies   Allergen Reactions    Bee Venom Shortness Of Breath    Penicillins Shortness Of Breath and Rash    Adhesive Tape Other (See Comments)     Skin blisters.        Family History   Problem Relation Age of Onset    Heart Disease Father     Diabetes Father        Social History     Tobacco Use    Smoking status: Never    Smokeless tobacco: Never   Vaping Use    Vaping Use: Never used   Substance Use Topics    Alcohol use: No    Drug use: No         Review of Systems   General ROS: positive for  - chills and fatigue  Hematological and Lymphatic ROS: negative  Respiratory ROS: no cough, shortness of breath, or wheezing  Cardiovascular ROS: no chest pain or dyspnea on exertion  Gastrointestinal ROS: RUQ abdominal pain, nausea, vomiting   Genito-Urinary ROS: dark urine  Musculoskeletal ROS: negative      PHYSICAL EXAM:    Vitals:    03/08/23 2339   BP: 129/68   Pulse: 77   Resp: 18   Temp: 98.3 °F (36.8 °C)   SpO2: 98%       General Appearance:  awake, alert, oriented, in no acute distress  Skin:  mild jaundice, warm and dry  Head/face:  NCAT  Eyes:  PERRL and EOMI  Lungs:  no increased work of breathing  Heart:  regular rate and rhythm   Abdomen:  soft, non tender, non distended, no rebound or guarding  Extremities: pulses present in all extremities    LABS:    CBC  Recent Labs     03/08/23  1636   WBC 10.9   HGB 14.2   HCT 42.4        BMP  Recent Labs     03/08/23  1636      K 3.5      CO2 23   BUN 22   CREATININE 1.1   CALCIUM 9.5     Liver Function  Recent Labs     03/08/23  1636   LIPASE 27   BILITOT 4.6*   *   *   ALKPHOS 482*   PROT 6.9   LABALBU 4.0     No results for input(s): LACTATE in the last 72 hours. No results for input(s): INR, PTT in the last 72 hours. Invalid input(s): PT    RADIOLOGY    CT ABDOMEN PELVIS W IV CONTRAST Additional Contrast? None    Result Date: 3/9/2023  EXAMINATION: CT OF THE ABDOMEN AND PELVIS WITH CONTRAST 3/9/2023 1:18 am TECHNIQUE: CT of the abdomen and pelvis was performed with the administration of intravenous contrast. Multiplanar reformatted images are provided for review. Automated exposure control, iterative reconstruction, and/or weight based adjustment of the mA/kV was utilized to reduce the radiation dose to as low as reasonably achievable. COMPARISON: 02/07/2018 HISTORY: ORDERING SYSTEM PROVIDED HISTORY: biliary obstruction? TECHNOLOGIST PROVIDED HISTORY: Reason for exam:->biliary obstruction?  Additional Contrast?->None Decision Support Exception - unselect if not a suspected or confirmed emergency medical condition->Emergency Medical Condition (MA) What reading provider will be dictating this exam?->CRC FINDINGS: Lower Chest: No pericardial effusion. Coronary artery atherosclerosis. No focal esophageal thickening is identified. No mediastinal lymphadenopathy is identified. Postoperative changes seen related to open heart surgery. Dependent airspace disease likely related to atelectasis. Organs: Cholelithiasis is identified. There is also a small stone noted in the very distal aspect of the common bile duct, without obstruction. No peripancreatic inflammatory changes are identified. Adrenal glands are unremarkable. No splenic mass. Splenic calcified granulomas are seen. No suspicious liver mass. Postoperative changes are seen involving the right kidney. This is from prior partial nephrectomy. No suspicious enhancing mass seen in this region to suggest recurrence. Unremarkable left kidney without enhancing mass. No hydroureteronephrosis is identified. GI/Bowel: Colonic diverticulosis. No acute diverticulitis. Normal appendix. No focal inflammatory bowel process is identified. Pelvis: No bladder wall thickening is seen. No pelvic mass. No free fluid or hemorrhage in the pelvis. Peritoneum/Retroperitoneum: No abdominal aortic aneurysm. No dissection. Atherosclerotic calcifications are identified. No retroperitoneal or mesenteric lymphadenopathy. No bowel herniation is identified. Bones/Soft Tissues: No acute subcutaneous soft tissue abnormality. Postoperative changes are seen in the pelvis. No acute pelvic fracture. Multilevel degenerative changes are seen in the spine. No osseous destructive process is seen. 1. Choledocholithiasis with a single small punctate nonobstructive calculus in the distal common bile duct. 2. Numerous calcifications are seen related to cholelithiasis, though without pericholecystic inflammatory change to suggest cholecystitis.  3. Dependent airspace disease in the lung bases bilaterally, likely related to atelectasis. 4. Diverticulosis. 5. Other nonacute incidental findings as above.          ASSESSMENT:  76 y.o. male with choledocholithiasis     PLAN:  - started on IV merrem by the ED, continue for cholangitis prophylaxis   - will consult endoscopy for ERCP   - consult cardiology for cardiac clearance and risk stratification given recent CABG   - interval cholecystectomy timing TBD   - keep NPO, mIVF     Will discuss with Dr. Benjamine Castleman     Electronically signed by Joan Hicks MD on 3/9/23 at 3:51 AM EST

## 2023-03-09 NOTE — ED NOTES
Report to Santosh Ochoa RN. Declines further questions/concerns. Bed dirty. Patient will be placed in transport to floor once bed is clean.      Hua Benitez RN  03/09/23 5225

## 2023-03-09 NOTE — PLAN OF CARE
Patient seen earlier on at bedside  Admitted overnight  abdominal pain. Denied having any jaundice. History of CABG about 6 months ago. No chest pain no shortness of breath. Imaging consistent with choledocholithiasis with concern for obstruction.     Surgery consulted for possible ERCP.  N.p.o. on IV hydration  Cardiology consulted for preop evaluation  Continue with other management as documented in H&P.

## 2023-03-09 NOTE — ED NOTES
Patient sitting in wheelchair in waiting area, , vitals retaken, patient family member updated on labs,2nd troponin was drawn. diagnostic testing and room status. Another warm blanket provided to patient no s/sx of distress, zero complaints of pain or discomfort at this time. Will continue to monitor.      Cydney Betancourt, [place]  53/43/75 [id number]

## 2023-03-09 NOTE — ED PROVIDER NOTES
[address]        Pt Name: Larisa Riddle  MRN: [id number]  [place] 1955  Date of evaluation: 3/8/2023  Provider: Elyssa Kessler MD  PCP: Hemalatha Hernandez MD  Note Started: 2:43 AM EST 3/9/23    CHIEF COMPLAINT       Chief Complaint   Patient presents with    Emesis     Emesis while driving today, denies cp/sob       HISTORY OF PRESENT ILLNESS: 1 or more Elements   History From: Patient    Limitations to history : None  Social Determinants : None    Larisa Riddle is a 76 y.o. male who presents nausea, vomiting, back pain. Patient states he has been having symptoms since Sunday but progressively worsened today. States that while he was driving he felt very nauseous had several episodes of vomiting, nonbilious, nonbloody and chills at that time. States the nausea and vomiting has since subsided after receiving Zofran in triage. Patient Is still having significant mid back pain, worse with movement. With no mid spinal tenderness. Describes symptoms moderate in severity with no alleviating or exacerbating factors. Denies any fever, chills, headache, dizziness, vision changes, neck tenderness or stiffness, weakness, cp, palpitations, leg swelling/tenderness, sob, cough, abd pain, dysuria, hematuria, diarrhea, constipation, bloody stools. Nursing Notes were all reviewed and agreed with or any disagreements were addressed in the HPI.    ROS:   Pertinent positives and negatives are stated within HPI, all other systems reviewed and are negative.      --------------------------------------------- PAST HISTORY ---------------------------------------------  Past Medical History:  has a past medical history of CAD (coronary artery disease), Hyperglycemia, Hyperlipidemia, Hypertension, Prolonged emergence from general anesthesia, Renal mass, and Supraventricular tachycardia ([facility] [address]     Past Surgical History:  has a past surgical history that includes Foot surgery (2013); Patella surgery (2004); Tonsillectomy; fracture surgery; hip surgery (Left); Kidney biopsy (Right, 11/07/2016); and Colonoscopy. Social History:  reports that he has never smoked. He has never used smokeless tobacco. He reports that he does not drink alcohol and does not use drugs. Family History: family history includes Diabetes in his father; Heart Disease in his father. The patients home medications have been reviewed. Allergies: Bee venom, Penicillins, and Adhesive tape    REVIEW OF EXTERNAL NOTE :      Chart reviewed:     PCP visit 01/05/23 reviewed. Patient being treated for HTN, HLD, hyperglycemia. Controlled Substance Monitoring:    Acute and Chronic Pain Monitoring:   No flowsheet data found.       ---------------------------------------------------PHYSICAL EXAM--------------------------------------      Constitutional/General: Alert and oriented x3, well appearing, non toxic in NAD  Head: Normocephalic and atraumatic  Eyes: EOMI, PERRL  Mouth: Oropharynx clear, handling secretions, no trismus  Neck: Supple, full ROM, no nuchal rigidity, no meningeal signs  Pulmonary: Lungs clear to auscultation bilaterally, no wheezes, rales, or rhonchi. Not in respiratory distress  Cardiovascular:  Regular rate. Regular rhythm. No murmurs. +2 distal pulses  Chest: no chest wall tenderness  Abdomen: Soft. Non tender. Non distended. No rebound, guarding, or rigidity. No pulsatile masses appreciated. Musculoskeletal: Mid back pain bilateral tenderness states that its moves all extremities x 4. Neurovascularly intact. Warm and well perfused, no clubbing, cyanosis, or edema. Compartments are soft and compressible. Capillary refill <3 seconds. Skin: warm and dry. No rashes. Neurologic: GCS 15, no gross focal neurologic deficits. Facial symmetry. Speech clear. Cranial nerves intact. No focal motor or sensory deficits. No ataxia with finger-to-nose or heel-to-shin.  No drift in upper or lower extremities. No nystagmus. Psych: Normal Affect    -------------------------------------------------- RESULTS -------------------------------------------------  I have personally reviewed all laboratory and imaging results for this patient. Results are listed below.      LABS:  Results for orders placed or performed during the hospital encounter of 03/09/23   COVID-19, Rapid    Specimen: Nasopharyngeal Swab   Result Value Ref Range    SARS-CoV-2, NAAT Not Detected Not Detected   Rapid influenza A/B antigens    Specimen: Nares   Result Value Ref Range    Influenza A by PCR Not Detected Not Detected    Influenza B by PCR Not Detected Not Detected   CBC with Auto Differential   Result Value Ref Range    WBC 10.9 4.5 - 11.5 E9/L    RBC 4.71 3.80 - 5.80 E12/L    Hemoglobin 14.2 12.5 - 16.5 g/dL    Hematocrit 42.4 37.0 - 54.0 %    MCV 90.0 80.0 - 99.9 fL    MCH 30.1 26.0 - 35.0 pg    MCHC 33.5 32.0 - 34.5 %    RDW 15.3 (H) 11.5 - 15.0 fL    Platelets 476 778 - 887 E9/L    MPV 9.9 7.0 - 12.0 fL    Neutrophils % 95.6 (H) 43.0 - 80.0 %    Lymphocytes % 0.9 (L) 20.0 - 42.0 %    Monocytes % 3.5 2.0 - 12.0 %    Eosinophils % 0.0 0.0 - 6.0 %    Basophils % 0.2 0.0 - 2.0 %    Neutrophils Absolute 10.46 (H) 1.80 - 7.30 E9/L    Lymphocytes Absolute 0.11 (L) 1.50 - 4.00 E9/L    Monocytes Absolute 0.44 0.10 - 0.95 E9/L    Eosinophils Absolute 0.00 (L) 0.05 - 0.50 E9/L    Basophils Absolute 0.00 0.00 - 0.20 E9/L    Polychromasia 1+     Poikilocytes 2+     Braymer Cells 2+     Ovalocytes 2+    CMP   Result Value Ref Range    Sodium 138 132 - 146 mmol/L    Potassium 3.5 3.5 - 5.0 mmol/L    Chloride 101 98 - 107 mmol/L    CO2 23 22 - 29 mmol/L    Anion Gap 14 7 - 16 mmol/L    Glucose 148 (H) 74 - 99 mg/dL    BUN 22 6 - 23 mg/dL    Creatinine 1.1 0.7 - 1.2 mg/dL    Est, Glom Filt Rate >60 >=60 mL/min/1.73    Calcium 9.5 8.6 - 10.2 mg/dL    Total Protein 6.9 6.4 - 8.3 g/dL    Albumin 4.0 3.5 - 5.2 g/dL    Total Bilirubin 4.6 (H) 0.0 - 1.2 mg/dL    Alkaline Phosphatase 482 (H) 40 - 129 U/L     (H) 0 - 40 U/L     (H) 0 - 39 U/L   Lipase   Result Value Ref Range    Lipase 27 13 - 60 U/L   Lactic Acid   Result Value Ref Range    Lactic Acid 1.8 0.5 - 2.2 mmol/L   Troponin   Result Value Ref Range    Troponin, High Sensitivity 15 (H) 0 - 11 ng/L   Troponin   Result Value Ref Range    Troponin, High Sensitivity 11 0 - 11 ng/L   Bilirubin, Direct   Result Value Ref Range    Bilirubin, Direct 2.8 (H) 0.0 - 0.3 mg/dL   Hepatic Function Panel   Result Value Ref Range    Total Protein 6.8 6.4 - 8.3 g/dL    Albumin 3.7 3.5 - 5.2 g/dL    Alkaline Phosphatase 411 (H) 40 - 129 U/L     (H) 0 - 40 U/L     (H) 0 - 39 U/L    Total Bilirubin 5.1 (H) 0.0 - 1.2 mg/dL    Bilirubin, Direct 2.0 (H) 0.0 - 0.3 mg/dL    Bilirubin, Indirect 3.1 (H) 0.0 - 1.0 mg/dL   EKG 12 Lead   Result Value Ref Range    Ventricular Rate 97 BPM    Atrial Rate 97 BPM    P-R Interval 136 ms    QRS Duration 86 ms    Q-T Interval 362 ms    QTc Calculation (Bazett) 459 ms    P Axis 66 degrees    R Axis -36 degrees    T Axis 56 degrees      EKG: This EKG is signed by emergency department physician. Rate: 97  Rhythm: Sinus  Interpretation:Possible Left atrial enlargement, Left axis deviation  Comparison: no previous EKG       RADIOLOGY:   Non-plain film images such as CT, Ultrasound and MRI are read by the radiologist. Plain radiographic images are visualized and preliminarily interpreted by the ED Provider with the below findings:    CXR with no obvious effusions or consolidations concerning pna, no ptx     Interpretation per the Radiologist below, if available at the time of this note:    CT ABDOMEN PELVIS W IV CONTRAST Additional Contrast? None   Final Result   1. Choledocholithiasis with a single small punctate nonobstructive calculus   in the distal common bile duct.    2. Numerous calcifications are seen related to cholelithiasis, though without   pericholecystic inflammatory change to suggest cholecystitis. 3. Dependent airspace disease in the lung bases bilaterally, likely related   to atelectasis. 4. Diverticulosis. 5. Other nonacute incidental findings as above. US GALLBLADDER RUQ   Final Result   Cholelithiasis in an otherwise normal-appearing gallbladder. Normal common   bile duct. XR CHEST (2 VW)   Final Result   No acute process. XR CHEST (2 VW)    Result Date: 3/8/2023  EXAMINATION: TWO XRAY VIEWS OF THE CHEST 3/8/2023 3:45 pm COMPARISON: None. HISTORY: ORDERING SYSTEM PROVIDED HISTORY: vomiting TECHNOLOGIST PROVIDED HISTORY: Reason for exam:->vomiting What reading provider will be dictating this exam?->CRC FINDINGS: Sternotomy wires noted. The lungs are without acute focal process. There is no effusion or pneumothorax. The cardiomediastinal silhouette is without acute process. The osseous structures are without acute process. No acute process. CT ABDOMEN PELVIS W IV CONTRAST Additional Contrast? None    Result Date: 3/9/2023  EXAMINATION: CT OF THE ABDOMEN AND PELVIS WITH CONTRAST 3/9/2023 1:18 am TECHNIQUE: CT of the abdomen and pelvis was performed with the administration of intravenous contrast. Multiplanar reformatted images are provided for review. Automated exposure control, iterative reconstruction, and/or weight based adjustment of the mA/kV was utilized to reduce the radiation dose to as low as reasonably achievable. COMPARISON: 02/07/2018 HISTORY: ORDERING SYSTEM PROVIDED HISTORY: biliary obstruction? TECHNOLOGIST PROVIDED HISTORY: Reason for exam:->biliary obstruction? Additional Contrast?->None Decision Support Exception - unselect if not a suspected or confirmed emergency medical condition->Emergency Medical Condition (MA) What reading provider will be dictating this exam?->CRC FINDINGS: Lower Chest: No pericardial effusion. Coronary artery atherosclerosis. No focal esophageal thickening is identified.   No mediastinal lymphadenopathy is identified. Postoperative changes seen related to open heart surgery. Dependent airspace disease likely related to atelectasis. Organs: Cholelithiasis is identified. There is also a small stone noted in the very distal aspect of the common bile duct, without obstruction. No peripancreatic inflammatory changes are identified. Adrenal glands are unremarkable. No splenic mass. Splenic calcified granulomas are seen. No suspicious liver mass. Postoperative changes are seen involving the right kidney. This is from prior partial nephrectomy. No suspicious enhancing mass seen in this region to suggest recurrence. Unremarkable left kidney without enhancing mass. No hydroureteronephrosis is identified. GI/Bowel: Colonic diverticulosis. No acute diverticulitis. Normal appendix. No focal inflammatory bowel process is identified. Pelvis: No bladder wall thickening is seen. No pelvic mass. No free fluid or hemorrhage in the pelvis. Peritoneum/Retroperitoneum: No abdominal aortic aneurysm. No dissection. Atherosclerotic calcifications are identified. No retroperitoneal or mesenteric lymphadenopathy. No bowel herniation is identified. Bones/Soft Tissues: No acute subcutaneous soft tissue abnormality. Postoperative changes are seen in the pelvis. No acute pelvic fracture. Multilevel degenerative changes are seen in the spine. No osseous destructive process is seen. 1. Choledocholithiasis with a single small punctate nonobstructive calculus in the distal common bile duct. 2. Numerous calcifications are seen related to cholelithiasis, though without pericholecystic inflammatory change to suggest cholecystitis. 3. Dependent airspace disease in the lung bases bilaterally, likely related to atelectasis. 4. Diverticulosis. 5. Other nonacute incidental findings as above.      US GALLBLADDER RUQ    Result Date: 3/8/2023  EXAMINATION: Ultrasound gallbladder right upper quadrant 3/8/2023 7:59 pm COMPARISON: None. HISTORY: ORDERING SYSTEM PROVIDED HISTORY: pain, vomiting, elevated liver enzymes TECHNOLOGIST PROVIDED HISTORY: Reason for exam:->pain, vomiting, elevated liver enzymes What reading provider will be dictating this exam?->CRC FINDINGS: Cholelithiasis in an otherwise normal-appearing gallbladder. Normal common bile duct at 4.3 mm. Cholelithiasis in an otherwise normal-appearing gallbladder. Normal common bile duct. No results found. Procedures:      ------------------------- NURSING NOTES AND VITALS REVIEWED ---------------------------   The nursing notes within the ED encounter and vital signs as below have been reviewed by myself and ED attending. /71   Pulse 79   Temp 97.3 °F (36.3 °C) (Temporal)   Resp 18   Ht 5' 9\" (1.753 m)   Wt 189 lb (85.7 kg)   SpO2 96%   BMI 27.91 kg/m²   Oxygen Saturation Interpretation: Normal    The patients available past medical records and past encounters were reviewed by myself and ED attending        ------------------------------ ED COURSE/MEDICAL DECISION MAKING----------------------    Medical Decision Making/Differential Diagnosis:    CC/HPI Summary, Pertinent Physical Exam Findings, Social Determinants of health, Records Reviewed, DDx, testing done/not done, ED Course, Reassessment, disposition considerations/shared decision making with patient, consults, disposition:      Medical Decision Making/ED COURSE:    Chronic Conditions affecting care:    has a past medical history of CAD (coronary artery disease), Hyperglycemia, Hyperlipidemia, Hypertension, Prolonged emergence from general anesthesia, Renal mass, and Supraventricular tachycardia ([address] Myself and ED attending interpreted findings during patient's stay. Vital signs /71   Pulse 79   Temp 97.3 °F (36.3 °C) (Temporal)   Resp 18   Ht 5' 9\" (1.753 m)   Wt 189 lb (85.7 kg)   SpO2 96%   BMI 27.91 kg/m²   Patient was placed on the cardiac monitor. Rhythm - Sinus. While in the ED patient was hemodynamically stable, afebrile, nontoxic-appearing, in no respiratory distress. Physical exam remarkable for bilateral low back pain, no midspinal tenderness. Working diagnosis include muscular strain, osteoarthritis, herniated disk, spinal stenosis, sciatica, sacroiliitis, compression fracture, kidney stones. Labs interpreted by me CBC with no leukocytosis or significant anemia,  CMP with stable renal function, no electrolyte derangement. Glucose 148. Elevated transaminases Bilirubin 4.6. Direct bili 2.8. Lactic 1.8, lipase 27. COVID neg. Trop 15>>11. CXR with no effusions or consolidations, no ptx. CT abdomen Choledocholithiasis with a single small punctate nonobstructive calculus in the distal common bile duct. Numerous calcifications are seen related to cholelithiasis, though without pericholecystic inflammatory change to suggest cholecystitis. Diverticulosis. U/S RUQ Cholelithiasis in an otherwise normal-appearing gallbladder. Normal common bile duct. EKG interpreted by me normal sinus LAE with no sign of acute ischemia. Patient administered IV: zofran, meropenem. On reevaluation patient had some symptomatic relief. Discussion With Other Professionals:  Consultation with Surgery and IM . The patient will be admitted for further treatment and evaluation for   1. Choledocholithiasis    2. Nausea and vomiting, unspecified vomiting type    3.  Acute bilateral low back pain without sciatica      IP CONSULT TO GENERAL SURGERY  IP CONSULT TO HOSPITALIST  IP CONSULT TO GENERAL SURGERY  IP CONSULT TO CARDIOLOGY  IP CONSULT TO GI    Please see ED course for more details:         Medications   amLODIPine (NORVASC) tablet 5 mg (5 mg Oral Given 3/9/23 1033)   aspirin chewable tablet 81 mg ( Oral Automatically Held 3/12/23 0900)   atorvastatin (LIPITOR) tablet 80 mg ( Oral Automatically Held 3/12/23 0900)   clopidogrel (PLAVIX) tablet 75 mg ( Oral Automatically Held 3/12/23 0900)   glipiZIDE (GLUCOTROL) tablet 2.5 mg (has no administration in time range)   metoprolol succinate (TOPROL XL) extended release tablet 100 mg (100 mg Oral Given 3/9/23 1033)   sodium chloride flush 0.9 % injection 10 mL (10 mLs IntraVENous Given 3/9/23 1216)   sodium chloride flush 0.9 % injection 10 mL (has no administration in time range)   0.9 % sodium chloride infusion (has no administration in time range)   enoxaparin (LOVENOX) injection 40 mg ( SubCUTAneous Automatically Held 3/12/23 0900)   promethazine (PHENERGAN) tablet 12.5 mg (has no administration in time range)     Or   ondansetron (ZOFRAN) injection 4 mg (has no administration in time range)   polyethylene glycol (GLYCOLAX) packet 17 g (has no administration in time range)   acetaminophen (TYLENOL) tablet 650 mg (has no administration in time range)     Or   acetaminophen (TYLENOL) suppository 650 mg (has no administration in time range)   0.9 % sodium chloride infusion ( IntraVENous New Bag 3/9/23 1213)   lactated ringers IV soln infusion (0 mLs IntraVENous Stopped 3/9/23 1533)   ondansetron (ZOFRAN-ODT) disintegrating tablet 4 mg (4 mg Oral Given 3/8/23 1649)   iopamidol (ISOVUE-370) 76 % injection 75 mL (75 mLs IntraVENous Given 3/9/23 0128)   sodium chloride flush 0.9 % injection 10 mL (10 mLs IntraVENous Given 3/9/23 0130)   meropenem (MERREM) 2,000 mg in sodium chloride 0.9 % 100 mL IVPB (0 mg IntraVENous Stopped 3/9/23 2312)       Current Discharge Medication List            Counseling: The emergency provider has spoken with the patient and discussed todays results, in addition to providing specific details for the plan of care and counseling regarding the diagnosis and prognosis. Questions are answered at this time and they are agreeable with the plan.       --------------------------------- IMPRESSION AND DISPOSITION ---------------------------------    IMPRESSION  1. Choledocholithiasis    2.  Nausea and vomiting, unspecified vomiting type    3. Acute bilateral low back pain without sciatica        DISPOSITION  Disposition: Admit to med/surg floor  Patient condition is stable        NOTE: This report was transcribed using voice recognition software.  Every effort was made to ensure accuracy; however, inadvertent computerized transcription errors may be present         Ayana Tabor MD  Resident  03/09/23 1281

## 2023-03-09 NOTE — H&P
Hospitalist History & Physical      PCP: Brittanie Nunes MD    Date of Service: Pt seen/examined on 3/9/2023    Chief Complaint:  had concerns including Emesis (Emesis while driving today, denies cp/sob). History Of Present Illness:    Mr. Miguel Villegas, a 76y.o. year old male  who  has a past medical history of CAD (coronary artery disease), Hyperglycemia, Hyperlipidemia, Hypertension, Prolonged emergence from general anesthesia, Renal mass, and Supraventricular tachycardia ([address] Patient presented to the emergency department with nausea vomiting and back pain. Symptoms began Sunday but have progressively become worse. Patient states that while he was driving he felt very nauseous and had several episodes of emesis. He still having significant mid back pain. Vital signs within normal limits and stable. Laboratory studies demonstrate glucose 148, alk phos 42, , . CT abdomen pelvis shows choledocholithiasis with a single small punctate nonobstructive calculus. General surgery was consulted and advised patient needs ERCP. Medicine consulted for admission. Past Medical History:   Diagnosis Date    CAD (coronary artery disease)     Hyperglycemia     Hyperlipidemia     Hypertension     Prolonged emergence from general anesthesia     Renal mass     Supraventricular tachycardia [facility])        Past Surgical History:   Procedure Laterality Date    COLONOSCOPY      FOOT SURGERY  2013    FRACTURE SURGERY      HIP SURGERY Left     rebuilt fell off tractor    KIDNEY BIOPSY Right 11/07/2016    PATELLA SURGERY  2004    TONSILLECTOMY         Prior to Admission medications    Medication Sig Start Date End Date Taking?  Authorizing Provider   metoprolol succinate (TOPROL XL) 100 MG extended release tablet TAKE ONE TABLET BY MOUTH EVERY DAY 12/19/22   Historical Provider, MD   amLODIPine (NORVASC) 5 MG tablet TAKE ONE TABLET BY MOUTH EVERY DAY 12/8/22   Historical Provider, MD   atorvastatin (LIPITOR) 80 MG tablet TAKE ONE TABLET BY MOUTH DAILY 11/11/22   Historical Provider, MD   metoprolol tartrate (LOPRESSOR) 25 MG tablet TAKE 2 TABLETS BY MOUTH TWICE DAILY 11/18/22   Historical Provider, MD   nitroGLYCERIN (NITROSTAT) 0.4 MG SL tablet Place 0.4 mg under the tongue every 5 minutes as needed 10/6/22 10/1/23  Historical Provider, MD   sildenafil (VIAGRA) 50 MG tablet TAKE 1 TABLET BY MOUTH IF NEEDED AS DIRECTED. 12/1/22   Historical Provider, MD   glipiZIDE (GLUCOTROL XL) 2.5 MG extended release tablet Take 1 tablet by mouth daily 12/8/22   Qamar Jackson MD   clopidogrel (PLAVIX) 75 MG tablet Take 75 mg by mouth daily    Historical Provider, MD   aspirin 81 MG tablet Take 81 mg by mouth daily     Historical Provider, MD         Allergies:  Bee venom, Penicillins, and Adhesive tape    Social History:    TOBACCO:   reports that he has never smoked. He has never used smokeless tobacco.  ETOH:   reports no history of alcohol use. Family History:    Reviewed in detail and negative for DM, CAD, Cancer, CVA. Positive as follows\"      Problem Relation Age of Onset    Heart Disease Father     Diabetes Father        REVIEW OF SYSTEMS:   Pertinent positives as noted in the HPI. All other systems reviewed and negative. PHYSICAL EXAM:  /68   Pulse 77   Temp 98.3 °F (36.8 °C) (Oral)   Resp 18   Wt 189 lb (85.7 kg)   SpO2 98%   BMI 27.91 kg/m²   General appearance: No apparent distress, appears stated age and cooperative. HEENT: Normal cephalic, atraumatic without obvious deformity. Pupils equal, round, and reactive to light. Extra ocular muscles intact. Conjunctivae/corneas clear. Neck: Supple, with full range of motion. No jugular venous distention. Trachea midline. Respiratory: CTA  Cardiovascular: RRR  Abdomen: S/NT/ND  Musculoskeletal: No clubbing, cyanosis, edema of bilateral lower extremities. Brisk capillary refill. Skin: Normal skin color. No rashes or lesions.   Neurologic: Neurovascularly intact without any focal sensory/motor deficits. Cranial nerves: II-XII intact, grossly non-focal.  Psychiatric: Alert and oriented, thought content appropriate, normal insight    Reviewed EKG and CXR personally      CBC:   Recent Labs     03/08/23  1636   WBC 10.9   RBC 4.71   HGB 14.2   HCT 42.4   MCV 90.0   RDW 15.3*        BMP:   Recent Labs     03/08/23  1636      K 3.5      CO2 23   BUN 22   CREATININE 1.1     LFT:  Recent Labs     03/08/23  1636   PROT 6.9   ALKPHOS 482*   *   *   BILITOT 4.6*   LIPASE 27     CE:  No results for input(s): Rhae Childes in the last 72 hours. PT/INR: No results for input(s): INR, APTT in the last 72 hours. BNP: No results for input(s): BNP in the last 72 hours. ESR: No results found for: SEDRATE  CRP: No results found for: CRP  D Dimer: No results found for: DDIMER   Folate and B12: No results found for: [id number], No results found for: FOLATE  Lactic Acid:   Lab Results   Component Value Date    LACTA 1.8 03/08/2023     Thyroid Studies: No results found for: TSH, Elinda Roland    Oupatient labs:  Lab Results   Component Value Date    CHOL 91 12/06/2022    TRIG 119 12/06/2022    HDL 29 12/06/2022    LDLCALC 38 12/06/2022    PSA 1.39 11/16/2016    INR 1.0 02/15/2018    LABA1C 6.7 (H) 02/15/2020       Urinalysis:  No results found for: NITRU, WBCUA, BACTERIA, RBCUA, BLOODU, SPECGRAV, GLUCOSEU    Imaging:  XR CHEST (2 VW)    Result Date: 3/8/2023  EXAMINATION: TWO XRAY VIEWS OF THE CHEST 3/8/2023 3:45 pm COMPARISON: None. HISTORY: ORDERING SYSTEM PROVIDED HISTORY: vomiting TECHNOLOGIST PROVIDED HISTORY: Reason for exam:->vomiting What reading provider will be dictating this exam?->CRC FINDINGS: Sternotomy wires noted. The lungs are without acute focal process. There is no effusion or pneumothorax. The cardiomediastinal silhouette is without acute process. The osseous structures are without acute process. No acute process. CT ABDOMEN PELVIS W IV CONTRAST Additional Contrast? None    Result Date: 3/9/2023  EXAMINATION: CT OF THE ABDOMEN AND PELVIS WITH CONTRAST 3/9/2023 1:18 am TECHNIQUE: CT of the abdomen and pelvis was performed with the administration of intravenous contrast. Multiplanar reformatted images are provided for review. Automated exposure control, iterative reconstruction, and/or weight based adjustment of the mA/kV was utilized to reduce the radiation dose to as low as reasonably achievable. COMPARISON: 02/07/2018 HISTORY: ORDERING SYSTEM PROVIDED HISTORY: biliary obstruction? TECHNOLOGIST PROVIDED HISTORY: Reason for exam:->biliary obstruction? Additional Contrast?->None Decision Support Exception - unselect if not a suspected or confirmed emergency medical condition->Emergency Medical Condition (MA) What reading provider will be dictating this exam?->CRC FINDINGS: Lower Chest: No pericardial effusion. Coronary artery atherosclerosis. No focal esophageal thickening is identified. No mediastinal lymphadenopathy is identified. Postoperative changes seen related to open heart surgery. Dependent airspace disease likely related to atelectasis. Organs: Cholelithiasis is identified. There is also a small stone noted in the very distal aspect of the common bile duct, without obstruction. No peripancreatic inflammatory changes are identified. Adrenal glands are unremarkable. No splenic mass. Splenic calcified granulomas are seen. No suspicious liver mass. Postoperative changes are seen involving the right kidney. This is from prior partial nephrectomy. No suspicious enhancing mass seen in this region to suggest recurrence. Unremarkable left kidney without enhancing mass. No hydroureteronephrosis is identified. GI/Bowel: Colonic diverticulosis. No acute diverticulitis. Normal appendix. No focal inflammatory bowel process is identified. Pelvis: No bladder wall thickening is seen.   No pelvic mass. No free fluid or hemorrhage in the pelvis. Peritoneum/Retroperitoneum: No abdominal aortic aneurysm. No dissection. Atherosclerotic calcifications are identified. No retroperitoneal or mesenteric lymphadenopathy. No bowel herniation is identified. Bones/Soft Tissues: No acute subcutaneous soft tissue abnormality. Postoperative changes are seen in the pelvis. No acute pelvic fracture. Multilevel degenerative changes are seen in the spine. No osseous destructive process is seen. 1. Choledocholithiasis with a single small punctate nonobstructive calculus in the distal common bile duct. 2. Numerous calcifications are seen related to cholelithiasis, though without pericholecystic inflammatory change to suggest cholecystitis. 3. Dependent airspace disease in the lung bases bilaterally, likely related to atelectasis. 4. Diverticulosis. 5. Other nonacute incidental findings as above. US GALLBLADDER RUQ    Result Date: 3/8/2023  EXAMINATION: Ultrasound gallbladder right upper quadrant 3/8/2023 7:59 pm COMPARISON: None. HISTORY: ORDERING SYSTEM PROVIDED HISTORY: pain, vomiting, elevated liver enzymes TECHNOLOGIST PROVIDED HISTORY: Reason for exam:->pain, vomiting, elevated liver enzymes What reading provider will be dictating this exam?->CRC FINDINGS: Cholelithiasis in an otherwise normal-appearing gallbladder. Normal common bile duct at 4.3 mm. Cholelithiasis in an otherwise normal-appearing gallbladder. Normal common bile duct.        ASSESSMENT:  -Choledocholithiasis  -Transaminitis  -Coronary disease  -Hypertension  -Hyperlipidemia  -Diabetes mellitus type 2      PLAN:  -Admit to medicine  -Consult general surgery  -ERCP  -Trend LFTs  -N.p.o. now  -Normal saline 75 mL/h  -Continue home medications        Diet: No diet orders on file  Code Status: No Order  Surrogate decision maker confirmed with patient:   Extended Emergency Contact Information  Primary Emergency Contact: Lashanda Mora  Address: [place] [address], [address] [place] [address] Phone: [phone]  Work Phone: [phone]  Relation: Spouse  Secondary Emergency Contact: [address] [place] Phone: [phone]  Relation: Child    DVT Prophylaxis: []Lovenox []Heparin []PCD [] [address] []Encouraged ambulation  Disposition: []Med/Surg [] Intermediate [] ICU/CCU  Admit status: [] Observation [] Inpatient     +++++++++++++++++++++++++++++++++++++++++++++++++  Neil Delgado DO  +++++++++++++++++++++++++++++++++++++++++++++++++  NOTE: This report was transcribed using voice recognition software. Every effort was made to ensure accuracy; however, inadvertent computerized transcription errors may be present.

## 2023-03-09 NOTE — PROGRESS NOTES
Benita messaged regarding procedure tomorrow and notifying him that patient took plavix on 3/8. No new orders at this time

## 2023-03-10 ENCOUNTER — ANESTHESIA EVENT (OUTPATIENT)
Dept: ENDOSCOPY | Age: 68
End: 2023-03-10
Payer: MEDICARE

## 2023-03-10 ENCOUNTER — ANESTHESIA (OUTPATIENT)
Dept: ENDOSCOPY | Age: 68
End: 2023-03-10
Payer: MEDICARE

## 2023-03-10 ENCOUNTER — APPOINTMENT (OUTPATIENT)
Dept: GENERAL RADIOLOGY | Age: 68
DRG: 419 | End: 2023-03-10
Payer: MEDICARE

## 2023-03-10 LAB
ABO/RH: NORMAL
ALBUMIN SERPL-MCNC: 3.6 G/DL (ref 3.5–5.2)
ALP BLD-CCNC: 477 U/L (ref 40–129)
ALT SERPL-CCNC: 173 U/L (ref 0–40)
ANION GAP SERPL CALCULATED.3IONS-SCNC: 12 MMOL/L (ref 7–16)
ANTIBODY SCREEN: NORMAL
AST SERPL-CCNC: 102 U/L (ref 0–39)
BASOPHILS ABSOLUTE: 0.02 E9/L (ref 0–0.2)
BASOPHILS RELATIVE PERCENT: 0.1 % (ref 0–2)
BILIRUB SERPL-MCNC: 5.4 MG/DL (ref 0–1.2)
BILIRUBIN DIRECT: 3.6 MG/DL (ref 0–0.3)
BILIRUBIN, INDIRECT: 1.8 MG/DL (ref 0–1)
BUN BLDV-MCNC: 17 MG/DL (ref 6–23)
CALCIUM SERPL-MCNC: 9.1 MG/DL (ref 8.6–10.2)
CHLORIDE BLD-SCNC: 100 MMOL/L (ref 98–107)
CO2: 26 MMOL/L (ref 22–29)
CREAT SERPL-MCNC: 1.2 MG/DL (ref 0.7–1.2)
EOSINOPHILS ABSOLUTE: 0.01 E9/L (ref 0.05–0.5)
EOSINOPHILS RELATIVE PERCENT: 0.1 % (ref 0–6)
GFR SERPL CREATININE-BSD FRML MDRD: >60 ML/MIN/1.73
GLUCOSE BLD-MCNC: 158 MG/DL (ref 74–99)
HCT VFR BLD CALC: 42 % (ref 37–54)
HEMOGLOBIN: 14.8 G/DL (ref 12.5–16.5)
IMMATURE GRANULOCYTES #: 0.06 E9/L
IMMATURE GRANULOCYTES %: 0.4 % (ref 0–5)
LIPASE: 36 U/L (ref 13–60)
LYMPHOCYTES ABSOLUTE: 0.77 E9/L (ref 1.5–4)
LYMPHOCYTES RELATIVE PERCENT: 5.7 % (ref 20–42)
MCH RBC QN AUTO: 31.8 PG (ref 26–35)
MCHC RBC AUTO-ENTMCNC: 35.2 % (ref 32–34.5)
MCV RBC AUTO: 90.1 FL (ref 80–99.9)
MONOCYTES ABSOLUTE: 0.98 E9/L (ref 0.1–0.95)
MONOCYTES RELATIVE PERCENT: 7.2 % (ref 2–12)
NEUTROPHILS ABSOLUTE: 11.71 E9/L (ref 1.8–7.3)
NEUTROPHILS RELATIVE PERCENT: 86.5 % (ref 43–80)
PDW BLD-RTO: 15.6 FL (ref 11.5–15)
PLATELET # BLD: 173 E9/L (ref 130–450)
PMV BLD AUTO: 10.5 FL (ref 7–12)
POTASSIUM REFLEX MAGNESIUM: 4.1 MMOL/L (ref 3.5–5)
RBC # BLD: 4.66 E12/L (ref 3.8–5.8)
SODIUM BLD-SCNC: 138 MMOL/L (ref 132–146)
TOTAL PROTEIN: 6.3 G/DL (ref 6.4–8.3)
TROPONIN, HIGH SENSITIVITY: 12 NG/L (ref 0–11)
WBC # BLD: 13.6 E9/L (ref 4.5–11.5)

## 2023-03-10 PROCEDURE — BF111ZZ FLUOROSCOPY OF BILIARY AND PANCREATIC DUCTS USING LOW OSMOLAR CONTRAST: ICD-10-PCS | Performed by: SURGERY

## 2023-03-10 PROCEDURE — 3609018800 HC ERCP DX COLLECTION SPECIMEN BRUSHING/WASHING: Performed by: SURGERY

## 2023-03-10 PROCEDURE — 2709999900 HC NON-CHARGEABLE SUPPLY: Performed by: SURGERY

## 2023-03-10 PROCEDURE — 36415 COLL VENOUS BLD VENIPUNCTURE: CPT

## 2023-03-10 PROCEDURE — 86901 BLOOD TYPING SEROLOGIC RH(D): CPT

## 2023-03-10 PROCEDURE — C9113 INJ PANTOPRAZOLE SODIUM, VIA: HCPCS

## 2023-03-10 PROCEDURE — 6360000004 HC RX CONTRAST MEDICATION: Performed by: SURGERY

## 2023-03-10 PROCEDURE — 3700000001 HC ADD 15 MINUTES (ANESTHESIA): Performed by: SURGERY

## 2023-03-10 PROCEDURE — 2580000003 HC RX 258

## 2023-03-10 PROCEDURE — 93005 ELECTROCARDIOGRAM TRACING: CPT

## 2023-03-10 PROCEDURE — 80053 COMPREHEN METABOLIC PANEL: CPT

## 2023-03-10 PROCEDURE — 0F798DZ DILATION OF COMMON BILE DUCT WITH INTRALUMINAL DEVICE, VIA NATURAL OR ARTIFICIAL OPENING ENDOSCOPIC: ICD-10-PCS | Performed by: SURGERY

## 2023-03-10 PROCEDURE — 86900 BLOOD TYPING SEROLOGIC ABO: CPT

## 2023-03-10 PROCEDURE — 0FC98ZZ EXTIRPATION OF MATTER FROM COMMON BILE DUCT, VIA NATURAL OR ARTIFICIAL OPENING ENDOSCOPIC: ICD-10-PCS | Performed by: SURGERY

## 2023-03-10 PROCEDURE — 3609015200 HC ERCP REMOVE CALCULI/DEBRIS BILIARY/PANCREAS DUCT: Performed by: SURGERY

## 2023-03-10 PROCEDURE — 2580000003 HC RX 258: Performed by: FAMILY MEDICINE

## 2023-03-10 PROCEDURE — 74330 X-RAY BILE/PANC ENDOSCOPY: CPT

## 2023-03-10 PROCEDURE — 80076 HEPATIC FUNCTION PANEL: CPT

## 2023-03-10 PROCEDURE — 84484 ASSAY OF TROPONIN QUANT: CPT

## 2023-03-10 PROCEDURE — BF532Z0 OTHER IMAGING OF GALLBLADDER AND BILE DUCTS USING FLUORESCING AGENT, INTRAOPERATIVE: ICD-10-PCS | Performed by: SURGERY

## 2023-03-10 PROCEDURE — 83690 ASSAY OF LIPASE: CPT

## 2023-03-10 PROCEDURE — 1200000000 HC SEMI PRIVATE

## 2023-03-10 PROCEDURE — A4216 STERILE WATER/SALINE, 10 ML: HCPCS

## 2023-03-10 PROCEDURE — C2625 STENT, NON-COR, TEM W/DEL SY: HCPCS | Performed by: SURGERY

## 2023-03-10 PROCEDURE — 2720000010 HC SURG SUPPLY STERILE: Performed by: SURGERY

## 2023-03-10 PROCEDURE — 6360000002 HC RX W HCPCS

## 2023-03-10 PROCEDURE — 99232 SBSQ HOSP IP/OBS MODERATE 35: CPT | Performed by: SURGERY

## 2023-03-10 PROCEDURE — 3609014900 HC ERCP W/SPHINCTEROTOMY &/OR PAPILLOTOMY: Performed by: SURGERY

## 2023-03-10 PROCEDURE — 3700000000 HC ANESTHESIA ATTENDED CARE: Performed by: SURGERY

## 2023-03-10 PROCEDURE — 7100000000 HC PACU RECOVERY - FIRST 15 MIN: Performed by: SURGERY

## 2023-03-10 PROCEDURE — 6370000000 HC RX 637 (ALT 250 FOR IP): Performed by: FAMILY MEDICINE

## 2023-03-10 PROCEDURE — 6360000002 HC RX W HCPCS: Performed by: STUDENT IN AN ORGANIZED HEALTH CARE EDUCATION/TRAINING PROGRAM

## 2023-03-10 PROCEDURE — 7100000001 HC PACU RECOVERY - ADDTL 15 MIN: Performed by: SURGERY

## 2023-03-10 PROCEDURE — 3609015100 HC ERCP STENT PLACEMENT BILIARY/PANCREATIC DUCT: Performed by: SURGERY

## 2023-03-10 PROCEDURE — 86850 RBC ANTIBODY SCREEN: CPT

## 2023-03-10 PROCEDURE — 85025 COMPLETE CBC W/AUTO DIFF WBC: CPT

## 2023-03-10 PROCEDURE — 2580000003 HC RX 258: Performed by: STUDENT IN AN ORGANIZED HEALTH CARE EDUCATION/TRAINING PROGRAM

## 2023-03-10 DEVICE — BILIARY STENT WITH RX DELIVERY SYSTEM
Type: IMPLANTABLE DEVICE | Site: BILE DUCT | Status: FUNCTIONAL
Brand: RX BILIARY

## 2023-03-10 RX ORDER — PROPOFOL 10 MG/ML
INJECTION, EMULSION INTRAVENOUS CONTINUOUS PRN
Status: DISCONTINUED | OUTPATIENT
Start: 2023-03-10 | End: 2023-03-10 | Stop reason: SDUPTHER

## 2023-03-10 RX ORDER — SODIUM CHLORIDE 9 MG/ML
INJECTION, SOLUTION INTRAVENOUS CONTINUOUS PRN
Status: DISCONTINUED | OUTPATIENT
Start: 2023-03-10 | End: 2023-03-10 | Stop reason: SDUPTHER

## 2023-03-10 RX ORDER — SODIUM CHLORIDE, SODIUM LACTATE, POTASSIUM CHLORIDE, CALCIUM CHLORIDE 600; 310; 30; 20 MG/100ML; MG/100ML; MG/100ML; MG/100ML
INJECTION, SOLUTION INTRAVENOUS CONTINUOUS
Status: DISCONTINUED | OUTPATIENT
Start: 2023-03-10 | End: 2023-03-11 | Stop reason: HOSPADM

## 2023-03-10 RX ORDER — KETOROLAC TROMETHAMINE 30 MG/ML
15 INJECTION, SOLUTION INTRAMUSCULAR; INTRAVENOUS EVERY 6 HOURS PRN
Status: DISCONTINUED | OUTPATIENT
Start: 2023-03-10 | End: 2023-03-11 | Stop reason: HOSPADM

## 2023-03-10 RX ADMIN — PROPOFOL 150 MCG/KG/MIN: 10 INJECTION, EMULSION INTRAVENOUS at 16:00

## 2023-03-10 RX ADMIN — MEROPENEM 1000 MG: 1 INJECTION, POWDER, FOR SOLUTION INTRAVENOUS at 18:36

## 2023-03-10 RX ADMIN — AMLODIPINE BESYLATE 5 MG: 5 TABLET ORAL at 09:12

## 2023-03-10 RX ADMIN — MEROPENEM 1000 MG: 1 INJECTION, POWDER, FOR SOLUTION INTRAVENOUS at 23:28

## 2023-03-10 RX ADMIN — MEROPENEM 1000 MG: 1 INJECTION, POWDER, FOR SOLUTION INTRAVENOUS at 09:23

## 2023-03-10 RX ADMIN — Medication 10 ML: at 09:23

## 2023-03-10 RX ADMIN — SODIUM CHLORIDE, POTASSIUM CHLORIDE, SODIUM LACTATE AND CALCIUM CHLORIDE: 600; 310; 30; 20 INJECTION, SOLUTION INTRAVENOUS at 23:28

## 2023-03-10 RX ADMIN — METOPROLOL SUCCINATE 100 MG: 100 TABLET, EXTENDED RELEASE ORAL at 09:12

## 2023-03-10 RX ADMIN — SODIUM CHLORIDE: 9 INJECTION, SOLUTION INTRAVENOUS at 15:57

## 2023-03-10 RX ADMIN — HYDROMORPHONE HYDROCHLORIDE 1 MG: 1 INJECTION, SOLUTION INTRAMUSCULAR; INTRAVENOUS; SUBCUTANEOUS at 22:36

## 2023-03-10 RX ADMIN — SODIUM CHLORIDE 40 MG: 9 INJECTION, SOLUTION INTRAMUSCULAR; INTRAVENOUS; SUBCUTANEOUS at 22:36

## 2023-03-10 RX ADMIN — PROMETHAZINE HYDROCHLORIDE 12.5 MG: 12.5 TABLET ORAL at 18:32

## 2023-03-10 ASSESSMENT — PAIN DESCRIPTION - FREQUENCY: FREQUENCY: CONTINUOUS

## 2023-03-10 ASSESSMENT — PAIN SCALES - GENERAL
PAINLEVEL_OUTOF10: 0
PAINLEVEL_OUTOF10: 0
PAINLEVEL_OUTOF10: 2
PAINLEVEL_OUTOF10: 0

## 2023-03-10 ASSESSMENT — PAIN DESCRIPTION - LOCATION: LOCATION: ABDOMEN

## 2023-03-10 ASSESSMENT — PAIN DESCRIPTION - DESCRIPTORS: DESCRIPTORS: ACHING;BURNING;CRAMPING

## 2023-03-10 ASSESSMENT — PAIN DESCRIPTION - ONSET: ONSET: GRADUAL

## 2023-03-10 ASSESSMENT — PAIN DESCRIPTION - PAIN TYPE: TYPE: SURGICAL PAIN

## 2023-03-10 ASSESSMENT — PAIN - FUNCTIONAL ASSESSMENT: PAIN_FUNCTIONAL_ASSESSMENT: ACTIVITIES ARE NOT PREVENTED

## 2023-03-10 NOTE — PROGRESS NOTES
[facility]  SURGICAL INTENSIVE CARE UNIT (SICU)  ATTENDING PHYSICIAN CRITICAL CARE PROGRESS NOTE     I have examined the patient, reviewed the record,and discussed the case with the APN/  Resident. I have reviewed all relevant labs and imaging data. Please refer to the  APN/ resident's note. I agree with the  assessment and plan with the following corrections/ additions made above     Please refer to the resident consult note on 3-9-2023 by Dr. Denny Herndon     Patient is a 27-year-old male with a significant history of coronary artery disease status post CABG on  currently on aspirin and Plavix is pertinent surgical history is a right partial nephrectomy ended up being at benignly vision and also has diabetes. He had right upper quadrant nausea and vomiting but mainly back pain which prompted his trip to the emergency room. He had had multiple episodes of nonbloody nonbilious emesis. He denies any episodes like this in the past his urine has been extremely dark since Sunday. BMP unremarkable LFTs elevated with a bilirubin 4.6 increased to 5.1, white blood cell count 10.9, platelet count 902    CT scan personally viewed and interpreted choledocholithiasis with severe cholelithiasis:  Recommend antibiotics prophylactically for cholangitis considering obstructing stone with elevated bilirubin  Advanced endoscopy consults for ERCP for obstructive jaundice  Then we will plan on laparoscopic cholecystectomy     The patient was advised of the risks, benefits, complications and options regarding laparoscopic cholecystectomy. This included but was not limited to bleeding, infection, damage to the bile duct or it's adjacent structures, the need for open cholecystectomy and  intraoperative cholangiogram. Other risks include hernia from port sites. The patient voiced an understanding and agreed to proceed.         Cecilia Medina MD

## 2023-03-10 NOTE — PROGRESS NOTES
GENERAL SURGERY  DAILY PROGRESS NOTE  3/10/2023  Chief Complaint   Patient presents with    Emesis     Emesis while driving today, denies cp/sob         Subjective:  Nauseated and threw up overnight. Had an attack of pain but the pain has dissipated. Nausea improved this morning. Objective:  /70   Pulse (!) 104   Temp 97.5 °F (36.4 °C) (Temporal)   Resp 18   Ht 5' 9\" (1.753 m)   Wt 189 lb (85.7 kg)   SpO2 98%   BMI 27.91 kg/m²     GENERAL:  Laying in bed, awake, alert, cooperative, no apparent distress  HEAD: Normocephalic, atraumatic  EYES: No sclera icterus, pupils equal  LUNGS:  No increased work of breathing  CARDIOVASCULAR:  regular rate  ABDOMEN:  Soft, TTP RUQ, nondistended  EXTREMITIES: No edema or swelling  SKIN: Warm and dry, no rashes or lesions    Assessment/Plan:  76 y.o. male with choledocholithiasis. Appreciate advanced endoscopies with management and procedures. Plan for ERCP today 3/10  Continue antibiotics for prophylaxis of his common duct  BMP unremarkable creatinine 1.2, LFTs slightly downtrending 100s but bilirubin increased to 5.4, leukocytosis increased to 13.6 from 14.8    Electronically signed by Herbert Russo MD on 3/10/2023 at 7:48 AM      [address] [facility] (SICU)  ATTENDING PHYSICIAN CRITICAL CARE PROGRESS NOTE     I have examined the patient, reviewed the record,and discussed the case with the APN/  Resident. I have reviewed all relevant labs and imaging data. Please refer to the  APN/ resident's note.  I agree with the  assessment and plan with the following corrections/ additions made above     Molly Small MD

## 2023-03-10 NOTE — ANESTHESIA PRE PROCEDURE
Department of Anesthesiology  Preprocedure Note       Name:  Ninfa Kinsey   Age:  76 y.o.  :  1955                                          MRN:  [id number]         Date:  3/10/2023      Surgeon: Leana Hernandez):  Jhoan Damico MD    Procedure: Procedure(s):  ERCP ENDOSCOPIC RETROGRADE CHOLANGIOPANCREATOGRAPHY  ERCP SPHINCTER/PAPILLOTOMY  ERCP STONE REMOVAL    Medications prior to admission:   Prior to Admission medications    Medication Sig Start Date End Date Taking?  Authorizing Provider   turmeric 500 MG CAPS Take 1,000 mg by mouth daily   Yes Historical Provider, MD   Multiple Vitamins-Minerals (THERAPEUTIC MULTIVITAMIN-MINERALS) tablet Take 1 tablet by mouth daily   Yes Historical Provider, MD   metoprolol succinate (TOPROL XL) 100 MG extended release tablet Take 100 mg by mouth daily 22   Historical Provider, MD   atorvastatin (LIPITOR) 80 MG tablet Take 80 mg by mouth daily 22   Historical Provider, MD   glipiZIDE (GLUCOTROL XL) 2.5 MG extended release tablet Take 1 tablet by mouth daily 22   Mirna Kolb MD   clopidogrel (PLAVIX) 75 MG tablet Take 75 mg by mouth daily    Historical Provider, MD   aspirin 81 MG tablet Take 81 mg by mouth daily     Historical Provider, MD       Current medications:    Current Facility-Administered Medications   Medication Dose Route Frequency Provider Last Rate Last Admin    indomethacin (INDOCIN) 50 MG suppository 100 mg  100 mg Rectal On Call to [address], DO        meropenem (MERREM) 1,000 mg in sodium chloride 0.9 % 100 mL IVPB ([id number])  1,000 mg IntraVENous Q8H Connie Gonzales MD   Stopped at 03/10/23 1230    iopamidol (ISOVUE-300) 61 % injection    PRN Jhoan Damico MD   13 mL at 03/10/23 1604    amLODIPine (NORVASC) tablet 5 mg  5 mg Oral Daily Garnette Coats, DO   5 mg at 03/10/23 0912    [Held by provider] aspirin chewable tablet 81 mg  81 mg Oral Daily Garnette Bells, DO        [Held by provider] atorvastatin (LIPITOR) tablet 80 mg  80 mg Oral Daily Margarito Conway DO   80 mg at 03/09/23 1033   • [Held by provider] clopidogrel (PLAVIX) tablet 75 mg  75 mg Oral Daily Margarito Conway DO       • glipiZIDE (GLUCOTROL) tablet 2.5 mg  2.5 mg Oral QAM AC Margarito Conway DO       • metoprolol succinate (TOPROL XL) extended release tablet 100 mg  100 mg Oral Daily Margarito Conway DO   100 mg at 03/10/23 0912   • sodium chloride flush 0.9 % injection 10 mL  10 mL IntraVENous 2 times per day Margarito Conway DO   10 mL at 03/10/23 0923   • sodium chloride flush 0.9 % injection 10 mL  10 mL IntraVENous PRN Margarito Conway DO       • 0.9 % sodium chloride infusion   IntraVENous PRN Margarito Conway DO       • [Held by provider] enoxaparin (LOVENOX) injection 40 mg  40 mg SubCUTAneous Daily Margarito Conway DO       • promethazine (PHENERGAN) tablet 12.5 mg  12.5 mg Oral Q6H PRN Margarito Conway DO        Or   • ondansetron (ZOFRAN) injection 4 mg  4 mg IntraVENous Q6H PRN Margarito Conway DO   4 mg at 03/09/23 1930   • polyethylene glycol (GLYCOLAX) packet 17 g  17 g Oral Daily PRN Margarito Conway DO       • acetaminophen (TYLENOL) tablet 650 mg  650 mg Oral Q6H PRN Margarito Conway DO        Or   • acetaminophen (TYLENOL) suppository 650 mg  650 mg Rectal Q6H PRN Margarito Conway DO       • 0.9 % sodium chloride infusion   IntraVENous Continuous Margarito Conway DO 75 mL/hr at 03/09/23 1213 New Bag at 03/09/23 1213   • lactated ringers IV soln infusion   IntraVENous Continuous Orquidea Serrano  mL/hr at 03/09/23 1931 New Bag at 03/09/23 1931     Facility-Administered Medications Ordered in Other Encounters   Medication Dose Route Frequency Provider Last Rate Last Admin   • propofol injection   IntraVENous Continuous PRN CATINA Shelby CRNA 77.13 mL/hr at 03/10/23 1600 150 mcg/kg/min at 03/10/23 1600   • 0.9 % sodium chloride infusion   IntraVENous Continuous PRN CATINA Shelby CRNA   New Bag at 03/10/23 1557       Allergies:    Allergies   Allergen  Reactions    Bee Venom Shortness Of Breath    Penicillins Shortness Of Breath and Rash    Adhesive Tape Other (See Comments)     Skin blisters. 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    Renal cell carcinoma of right kidney ([facility] C64.1    Stage 3b chronic kidney disease ([facility] [address] [facility] N18.32    Choledocholithiasis K80.50    Preop cardiovascular exam Z01.810    Preoperative clearance Z01.818       Past Medical History:        Diagnosis Date    CAD (coronary artery disease)     Hyperglycemia     Hyperlipidemia     Hypertension     Prolonged emergence from general anesthesia     Renal mass     Supraventricular tachycardia ([address]        Past Surgical History:        Procedure Laterality Date    COLONOSCOPY      FOOT SURGERY  2013    FRACTURE SURGERY      HIP SURGERY Left     rebuilt fell off tractor    KIDNEY BIOPSY Right 11/07/2016    PATELLA SURGERY  2004    TONSILLECTOMY         Social History:    Social History     Tobacco Use    Smoking status: Never    Smokeless tobacco: Never   Substance Use Topics    Alcohol use:  No                                Counseling given: Not Answered      Vital Signs (Current):   Vitals:    03/09/23 1745 03/09/23 1819 03/09/23 1930 03/10/23 0800   BP:  117/71 110/70 121/81   Pulse:  79 (!) 104 77   Resp:  18 18 16   Temp:  36.3 °C (97.3 °F) 36.4 °C (97.5 °F) 36.6 °C (97.9 °F)   TempSrc:  Temporal Temporal Temporal   SpO2:   98% 99%   Weight: 189 lb (85.7 kg) Height: 5' 9\" (1.753 m)                                                 BP Readings from Last 3 Encounters:   03/10/23 121/81   01/05/23 98/66   12/08/22 122/74       NPO Status:  > 12 hours                                                                               BMI:   Wt Readings from Last 3 Encounters:   03/09/23 189 lb (85.7 kg)   01/05/23 189 lb (85.7 kg)   12/14/22 177 lb (80.3 kg)     Body mass index is 27.91 kg/m². CBC:   Lab Results   Component Value Date/Time    WBC 13.6 03/10/2023 07:22 AM    RBC 4.66 03/10/2023 07:22 AM    HGB 14.8 03/10/2023 07:22 AM    HCT 42.0 03/10/2023 07:22 AM    MCV 90.1 03/10/2023 07:22 AM    RDW 15.6 03/10/2023 07:22 AM     03/10/2023 07:22 AM       CMP:   Lab Results   Component Value Date/Time     03/10/2023 07:22 AM    K 4.1 03/10/2023 07:22 AM     03/10/2023 07:22 AM    CO2 26 03/10/2023 07:22 AM    BUN 17 03/10/2023 07:22 AM    CREATININE 1.2 03/10/2023 07:22 AM    GFRAA >60 02/15/2020 08:16 AM    LABGLOM >60 03/10/2023 07:22 AM    GLUCOSE 158 03/10/2023 07:22 AM    PROT 6.3 03/10/2023 07:22 AM    CALCIUM 9.1 03/10/2023 07:22 AM    BILITOT 5.4 03/10/2023 07:22 AM    ALKPHOS 477 03/10/2023 07:22 AM     03/10/2023 07:22 AM     03/10/2023 07:22 AM       POC Tests: No results for input(s): POCGLU, POCNA, POCK, POCCL, POCBUN, POCHEMO, POCHCT in the last 72 hours.     Coags:   Lab Results   Component Value Date/Time    PROTIME 11.1 02/15/2018 09:25 AM    INR 1.0 02/15/2018 09:25 AM    APTT 32.0 02/15/2018 09:25 AM       HCG (If Applicable): No results found for: PREGTESTUR, PREGSERUM, HCG, HCGQUANT     ABGs: No results found for: PHART, PO2ART, EXU9BZT, VCQ0ZGO, BEART, Z0XPDXGR     Type & Screen (If Applicable):  No results found for: LABABO, LABRH    Drug/Infectious Status (If Applicable):  No results found for: HIV, HEPCAB    COVID-19 Screening (If Applicable):   Lab Results   Component Value Date/Time    COVID19 Not Detected 03/08/2023 04:36 PM           Anesthesia Evaluation  Patient summary reviewed and Nursing notes reviewed no history of anesthetic complications:   Airway: Mallampati: III  TM distance: >3 FB   Neck ROM: full  Mouth opening: > = 3 FB   Dental: normal exam         Pulmonary:                              Cardiovascular:    (+) hypertension:, angina:, CAD:, dysrhythmias:,       ECG reviewed  Rhythm: regular  Rate: normal                    Neuro/Psych:               GI/Hepatic/Renal:   (+) renal disease:,           Endo/Other:    (+) Diabetes, . Abdominal:             Vascular: Other Findings:           Anesthesia Plan      MAC     ASA 3       Induction: intravenous. Anesthetic plan and risks discussed with patient. Plan discussed with attending.                     CATINA Martinez - CRNA   3/10/2023

## 2023-03-10 NOTE — BRIEF OP NOTE
Brief Postoperative Note      Patient: Abbie Gonsalez  YOB: 1955  MRN: [id number]    Date of Procedure: 3/10/2023    Pre-Op Diagnosis: Cholecystitis [K81.9]    Post-Op Diagnosis:  choledocholithiasis       Procedure(s):  ERCP ENDOSCOPIC RETROGRADE CHOLANGIOPANCREATOGRAPHY  ERCP SPHINCTER/PAPILLOTOMY  ERCP STONE REMOVAL  ERCP STENT INSERTION    Surgeon(s):  Lucretia De Anda MD    Assistant:  * No surgical staff found *    Anesthesia: Monitor Anesthesia Care    Estimated Blood Loss (mL): less than 50     Complications: None    Specimens:   * No specimens in log *    Implants:  Implant Name Type Inv. Item Serial No.  Lot No. LRB No. Used Action   STENT BILI AD L100MM DIA3. 3MM RX - [id number]  STENT BILI AD L100MM DIA3. 3MM RX  [employer]- [id number] N/A 1 Implanted         Drains: * No LDAs found *    Findings: stone removed, stent placed    Electronically signed by Lucretia De Anda MD on 3/10/2023 at 5:15 PM Yes

## 2023-03-10 NOTE — PROGRESS NOTES
Hospitalist Progress Note      Synopsis: Patient admitted on 3/9/2023   Patient with a history of coronary artery disease status post CABG, hypertension presented with abdominal pain. Imaging consistent with choledocholithiasis with concern for obstruction. Surgery consulted. N.p.o. for possible ERCP    Subjective  Patient had chills nausea vomiting overnight. On IV meropenem  Currently n.p.o. for possible ERCP today. Exam:  /80   Pulse 80   Temp 97 °F (36.1 °C) (Temporal)   Resp 14   Ht 5' 9\" (1.753 m)   Wt 189 lb (85.7 kg)   SpO2 94%   BMI 27.91 kg/m²   General appearance: No apparent distress, appears stated age and cooperative. HEENT: Pupils equal, round, and reactive to light. Conjunctivae/corneas clear. Neck: Supple. No jugular venous distention. Trachea midline. Respiratory:  Normal respiratory effort. Clear to auscultation, bilaterally without Rales/Wheezes/Rhonchi. Cardiovascular: Regular rate and rhythm with normal S1/S2 without murmurs, rubs or gallops. Abdomen: Soft, non-tender, non-distended with normal bowel sounds. Musculoskeletal: No clubbing, cyanosis or edema bilaterally. Brisk capillary refill. 2+ lower extremity pulses (dorsalis pedis).    Skin:  No rashes    Neurologic: awake, alert and following commands     Medications:  Reviewed    Infusion Medications    sodium chloride      sodium chloride 75 mL/hr at 03/09/23 1213    lactated ringers IV soln 100 mL/hr at 03/09/23 1931     Scheduled Medications    indomethacin  100 mg Rectal On Call to OR    meropenem  1,000 mg IntraVENous Q8H    amLODIPine  5 mg Oral Daily    [Held by provider] aspirin  81 mg Oral Daily    [Held by provider] atorvastatin  80 mg Oral Daily    [Held by provider] clopidogrel  75 mg Oral Daily    glipiZIDE  2.5 mg Oral QAM AC    metoprolol succinate  100 mg Oral Daily    sodium chloride flush  10 mL IntraVENous 2 times per day    [Held by provider] enoxaparin  40 mg SubCUTAneous Daily     PRN Meds: sodium chloride flush, sodium chloride, promethazine **OR** ondansetron, polyethylene glycol, acetaminophen **OR** acetaminophen    I/O    Intake/Output Summary (Last 24 hours) at 3/10/2023 1639  Last data filed at 3/10/2023 1616  Gross per 24 hour   Intake 100 ml   Output --   Net 100 ml       Labs:   Recent Labs     03/08/23  1636 03/10/23  0722   WBC 10.9 13.6*   HGB 14.2 14.8   HCT 42.4 42.0    173       Recent Labs     03/08/23  1636 03/10/23  0722    138   K 3.5 4.1    100   CO2 23 26   BUN 22 17   CREATININE 1.1 1.2   CALCIUM 9.5 9.1       Recent Labs     03/08/23  1636 03/09/23  0438 03/10/23  0722   PROT 6.9 6.8 6.3*   ALKPHOS 482* 411* 477*   * 238* 173*   * 140* 102*   BILITOT 4.6* 5.1* 5.4*   LIPASE 27  --   --        No results for input(s): INR in the last 72 hours. No results for input(s): Jeanna Kaska in the last 72 hours. Chronic labs:  Lab Results   Component Value Date    CHOL 91 12/06/2022    TRIG 119 12/06/2022    HDL 29 12/06/2022    LDLCALC 38 12/06/2022    PSA 1.39 11/16/2016    INR 1.0 02/15/2018    LABA1C 6.7 (H) 02/15/2020       Radiology:  Imaging studies reviewed today. ASSESSMENT:  Choledocholithiasis  Abdominal pain  Concern for ascending cholangitis  Coronary artery disease status post CABG  Hypertension         PLAN:  Continue patient admission  Agree with IV antibiotics recurrent chills. For cholangitis prophylaxis. Continue antihypertensives  IV hydration. N.p.o. for procedure today. Diet: ADULT DIET; Regular; Low Fat/Low Chol/High Fiber/JUDI  Code Status: Full Code  PT/OT Eval Status:     DVT Prophylaxis:     Recommended disposition at discharge:    Not yet ready for discharge today. ERCP pending.  +++++++++++++++++++++++++++++++++++++++++++++++++  Pao Roy MD   [facility].  +++++++++++++++++++++++++++++++++++++++++++++++++  NOTE: This report was transcribed using voice recognition software.  Every effort was made to ensure accuracy; however, inadvertent computerized transcription errors may be present.

## 2023-03-10 NOTE — ANESTHESIA POSTPROCEDURE EVALUATION
Department of Anesthesiology  Postprocedure Note    Patient: Peyton Nathan  MRN: [id number]  [place]: 1955  Date of evaluation: 3/10/2023      Procedure Summary     Date: 03/10/23 Room / Location: [place] / [facility]    Anesthesia Start: 9061 Anesthesia Stop: 1620    Procedures:       ERCP ENDOSCOPIC RETROGRADE CHOLANGIOPANCREATOGRAPHY      ERCP SPHINCTER/PAPILLOTOMY      ERCP STONE REMOVAL      ERCP STENT INSERTION Diagnosis:       Cholecystitis      (Cholecystitis [K81.9])    Surgeons: Jany Henning MD Responsible Provider: Cuate Fernandez MD    Anesthesia Type: MAC ASA Status: 3          Anesthesia Type: No value filed.     Irene Phase I: Irene Score: 10    Irene Phase II:        Anesthesia Post Evaluation    Patient location during evaluation: PACU  Patient participation: complete - patient participated  Level of consciousness: awake  Pain score: 0  Airway patency: patent  Nausea & Vomiting: no nausea  Complications: no  Cardiovascular status: hemodynamically stable  Respiratory status: acceptable  Hydration status: stable

## 2023-03-10 NOTE — CARE COORDINATION
Social Work Discharge Planning:  SW met with patient and spouse at bedside. Patient lives with spouse in a 1 story home. Patient independent prior to admission with no DME. Patient has a hx with [facility]. PCP is Dr. Artur Newman and pharmacy is [facility] on [facility]. SW will continue to follow and assist with transition of care.  Electronically signed by ANIYA Lee on 3/9/2023 at 7:37 PM

## 2023-03-10 NOTE — PROGRESS NOTES
Patient seen and evaluated. No new complaints or overnight events. Heart rate is controled. No abdominal complaints. Plan for ERCP today. All questions were answered to the satisfaction of the patient/responsible party. Consent has been obtained. The risks, benefits, alternatives, and potential complications of the procedure, including the risks of bleeding, infection, injury to surrounding structures, and need for additional procedures were explained to the patient/responsible party. All questions were answered. They understand and agree to the procedure.      Electronically signed by Leticia Ramirez DO on 3/10/2023 at 7:23 AM

## 2023-03-11 ENCOUNTER — ANESTHESIA EVENT (OUTPATIENT)
Dept: OPERATING ROOM | Age: 68
End: 2023-03-11
Payer: MEDICARE

## 2023-03-11 ENCOUNTER — ANESTHESIA (OUTPATIENT)
Dept: OPERATING ROOM | Age: 68
End: 2023-03-11
Payer: MEDICARE

## 2023-03-11 VITALS
HEIGHT: 69 IN | DIASTOLIC BLOOD PRESSURE: 87 MMHG | HEART RATE: 76 BPM | TEMPERATURE: 97 F | BODY MASS INDEX: 27.99 KG/M2 | RESPIRATION RATE: 20 BRPM | OXYGEN SATURATION: 99 % | WEIGHT: 189 LBS | SYSTOLIC BLOOD PRESSURE: 134 MMHG

## 2023-03-11 LAB
ALBUMIN SERPL-MCNC: 3.1 G/DL (ref 3.5–5.2)
ALP BLD-CCNC: 525 U/L (ref 40–129)
ALT SERPL-CCNC: 165 U/L (ref 0–40)
AST SERPL-CCNC: 134 U/L (ref 0–39)
BILIRUB SERPL-MCNC: 5.2 MG/DL (ref 0–1.2)
BILIRUBIN DIRECT: 3.7 MG/DL (ref 0–0.3)
BILIRUBIN, INDIRECT: 1.5 MG/DL (ref 0–1)
LIPASE: 35 U/L (ref 13–60)
TOTAL PROTEIN: 6.1 G/DL (ref 6.4–8.3)

## 2023-03-11 PROCEDURE — 3700000001 HC ADD 15 MINUTES (ANESTHESIA): Performed by: SURGERY

## 2023-03-11 PROCEDURE — 6360000002 HC RX W HCPCS: Performed by: NURSE ANESTHETIST, CERTIFIED REGISTERED

## 2023-03-11 PROCEDURE — 2580000003 HC RX 258: Performed by: NURSE ANESTHETIST, CERTIFIED REGISTERED

## 2023-03-11 PROCEDURE — A4216 STERILE WATER/SALINE, 10 ML: HCPCS

## 2023-03-11 PROCEDURE — 2709999900 HC NON-CHARGEABLE SUPPLY: Performed by: SURGERY

## 2023-03-11 PROCEDURE — 2580000003 HC RX 258: Performed by: FAMILY MEDICINE

## 2023-03-11 PROCEDURE — 0FT44ZZ RESECTION OF GALLBLADDER, PERCUTANEOUS ENDOSCOPIC APPROACH: ICD-10-PCS | Performed by: SURGERY

## 2023-03-11 PROCEDURE — 6360000002 HC RX W HCPCS: Performed by: ANESTHESIOLOGY

## 2023-03-11 PROCEDURE — 2580000003 HC RX 258: Performed by: STUDENT IN AN ORGANIZED HEALTH CARE EDUCATION/TRAINING PROGRAM

## 2023-03-11 PROCEDURE — 43264 ERCP REMOVE DUCT CALCULI: CPT | Performed by: SURGERY

## 2023-03-11 PROCEDURE — 88304 TISSUE EXAM BY PATHOLOGIST: CPT

## 2023-03-11 PROCEDURE — 6370000000 HC RX 637 (ALT 250 FOR IP): Performed by: FAMILY MEDICINE

## 2023-03-11 PROCEDURE — 7100000000 HC PACU RECOVERY - FIRST 15 MIN: Performed by: SURGERY

## 2023-03-11 PROCEDURE — 6360000002 HC RX W HCPCS

## 2023-03-11 PROCEDURE — 3600000004 HC SURGERY LEVEL 4 BASE: Performed by: SURGERY

## 2023-03-11 PROCEDURE — 7100000001 HC PACU RECOVERY - ADDTL 15 MIN: Performed by: SURGERY

## 2023-03-11 PROCEDURE — 3600000014 HC SURGERY LEVEL 4 ADDTL 15MIN: Performed by: SURGERY

## 2023-03-11 PROCEDURE — 6360000002 HC RX W HCPCS: Performed by: STUDENT IN AN ORGANIZED HEALTH CARE EDUCATION/TRAINING PROGRAM

## 2023-03-11 PROCEDURE — 99231 SBSQ HOSP IP/OBS SF/LOW 25: CPT | Performed by: SURGERY

## 2023-03-11 PROCEDURE — 3700000000 HC ANESTHESIA ATTENDED CARE: Performed by: SURGERY

## 2023-03-11 PROCEDURE — 43274 ERCP DUCT STENT PLACEMENT: CPT | Performed by: SURGERY

## 2023-03-11 PROCEDURE — C9113 INJ PANTOPRAZOLE SODIUM, VIA: HCPCS

## 2023-03-11 PROCEDURE — 2500000003 HC RX 250 WO HCPCS: Performed by: SURGERY

## 2023-03-11 PROCEDURE — 80076 HEPATIC FUNCTION PANEL: CPT

## 2023-03-11 PROCEDURE — 2500000003 HC RX 250 WO HCPCS: Performed by: NURSE ANESTHETIST, CERTIFIED REGISTERED

## 2023-03-11 PROCEDURE — 36415 COLL VENOUS BLD VENIPUNCTURE: CPT

## 2023-03-11 PROCEDURE — 2580000003 HC RX 258

## 2023-03-11 PROCEDURE — 83690 ASSAY OF LIPASE: CPT

## 2023-03-11 PROCEDURE — 47562 LAPAROSCOPIC CHOLECYSTECTOMY: CPT | Performed by: SURGERY

## 2023-03-11 RX ORDER — LIDOCAINE HYDROCHLORIDE 20 MG/ML
INJECTION, SOLUTION INTRAVENOUS PRN
Status: DISCONTINUED | OUTPATIENT
Start: 2023-03-11 | End: 2023-03-11 | Stop reason: SDUPTHER

## 2023-03-11 RX ORDER — OXYCODONE HYDROCHLORIDE 5 MG/1
5 TABLET ORAL EVERY 6 HOURS PRN
Qty: 20 TABLET | Refills: 0 | Status: SHIPPED | OUTPATIENT
Start: 2023-03-11 | End: 2023-03-16

## 2023-03-11 RX ORDER — DEXAMETHASONE SODIUM PHOSPHATE 10 MG/ML
INJECTION INTRAMUSCULAR; INTRAVENOUS PRN
Status: DISCONTINUED | OUTPATIENT
Start: 2023-03-11 | End: 2023-03-11 | Stop reason: SDUPTHER

## 2023-03-11 RX ORDER — BUPIVACAINE HYDROCHLORIDE 2.5 MG/ML
INJECTION, SOLUTION EPIDURAL; INFILTRATION; INTRACAUDAL PRN
Status: DISCONTINUED | OUTPATIENT
Start: 2023-03-11 | End: 2023-03-11 | Stop reason: ALTCHOICE

## 2023-03-11 RX ORDER — SODIUM CHLORIDE 9 MG/ML
INJECTION, SOLUTION INTRAVENOUS PRN
Status: DISCONTINUED | OUTPATIENT
Start: 2023-03-11 | End: 2023-03-11 | Stop reason: HOSPADM

## 2023-03-11 RX ORDER — SODIUM CHLORIDE 0.9 % (FLUSH) 0.9 %
5-40 SYRINGE (ML) INJECTION EVERY 12 HOURS SCHEDULED
Status: DISCONTINUED | OUTPATIENT
Start: 2023-03-11 | End: 2023-03-11 | Stop reason: HOSPADM

## 2023-03-11 RX ORDER — PROPOFOL 10 MG/ML
INJECTION, EMULSION INTRAVENOUS PRN
Status: DISCONTINUED | OUTPATIENT
Start: 2023-03-11 | End: 2023-03-11 | Stop reason: SDUPTHER

## 2023-03-11 RX ORDER — MORPHINE SULFATE 2 MG/ML
1 INJECTION, SOLUTION INTRAMUSCULAR; INTRAVENOUS EVERY 5 MIN PRN
Status: DISCONTINUED | OUTPATIENT
Start: 2023-03-11 | End: 2023-03-11 | Stop reason: HOSPADM

## 2023-03-11 RX ORDER — SODIUM CHLORIDE, SODIUM LACTATE, POTASSIUM CHLORIDE, CALCIUM CHLORIDE 600; 310; 30; 20 MG/100ML; MG/100ML; MG/100ML; MG/100ML
INJECTION, SOLUTION INTRAVENOUS CONTINUOUS PRN
Status: DISCONTINUED | OUTPATIENT
Start: 2023-03-11 | End: 2023-03-11 | Stop reason: SDUPTHER

## 2023-03-11 RX ORDER — LIDOCAINE HYDROCHLORIDE AND EPINEPHRINE 10; 10 MG/ML; UG/ML
INJECTION, SOLUTION INFILTRATION; PERINEURAL PRN
Status: DISCONTINUED | OUTPATIENT
Start: 2023-03-11 | End: 2023-03-11 | Stop reason: ALTCHOICE

## 2023-03-11 RX ORDER — MORPHINE SULFATE 2 MG/ML
2 INJECTION, SOLUTION INTRAMUSCULAR; INTRAVENOUS EVERY 5 MIN PRN
Status: DISCONTINUED | OUTPATIENT
Start: 2023-03-11 | End: 2023-03-11 | Stop reason: HOSPADM

## 2023-03-11 RX ORDER — SODIUM CHLORIDE 0.9 % (FLUSH) 0.9 %
5-40 SYRINGE (ML) INJECTION PRN
Status: DISCONTINUED | OUTPATIENT
Start: 2023-03-11 | End: 2023-03-11 | Stop reason: HOSPADM

## 2023-03-11 RX ORDER — ONDANSETRON 2 MG/ML
INJECTION INTRAMUSCULAR; INTRAVENOUS PRN
Status: DISCONTINUED | OUTPATIENT
Start: 2023-03-11 | End: 2023-03-11 | Stop reason: SDUPTHER

## 2023-03-11 RX ORDER — FENTANYL CITRATE 50 UG/ML
INJECTION, SOLUTION INTRAMUSCULAR; INTRAVENOUS PRN
Status: DISCONTINUED | OUTPATIENT
Start: 2023-03-11 | End: 2023-03-11 | Stop reason: SDUPTHER

## 2023-03-11 RX ORDER — ROCURONIUM BROMIDE 10 MG/ML
INJECTION, SOLUTION INTRAVENOUS PRN
Status: DISCONTINUED | OUTPATIENT
Start: 2023-03-11 | End: 2023-03-11 | Stop reason: SDUPTHER

## 2023-03-11 RX ORDER — MEPERIDINE HYDROCHLORIDE 25 MG/ML
12.5 INJECTION INTRAMUSCULAR; INTRAVENOUS; SUBCUTANEOUS EVERY 5 MIN PRN
Status: DISCONTINUED | OUTPATIENT
Start: 2023-03-11 | End: 2023-03-11 | Stop reason: HOSPADM

## 2023-03-11 RX ORDER — LABETALOL HYDROCHLORIDE 5 MG/ML
INJECTION, SOLUTION INTRAVENOUS PRN
Status: DISCONTINUED | OUTPATIENT
Start: 2023-03-11 | End: 2023-03-11 | Stop reason: SDUPTHER

## 2023-03-11 RX ADMIN — DEXAMETHASONE SODIUM PHOSPHATE 10 MG: 10 INJECTION INTRAMUSCULAR; INTRAVENOUS at 11:27

## 2023-03-11 RX ADMIN — SODIUM CHLORIDE 40 MG: 9 INJECTION, SOLUTION INTRAMUSCULAR; INTRAVENOUS; SUBCUTANEOUS at 08:37

## 2023-03-11 RX ADMIN — MEROPENEM 1000 MG: 1 INJECTION, POWDER, FOR SOLUTION INTRAVENOUS at 08:36

## 2023-03-11 RX ADMIN — MEROPENEM 1000 MG: 1 INJECTION, POWDER, FOR SOLUTION INTRAVENOUS at 16:24

## 2023-03-11 RX ADMIN — LABETALOL HYDROCHLORIDE 5 MG: 5 INJECTION INTRAVENOUS at 11:47

## 2023-03-11 RX ADMIN — SODIUM CHLORIDE, POTASSIUM CHLORIDE, SODIUM LACTATE AND CALCIUM CHLORIDE: 600; 310; 30; 20 INJECTION, SOLUTION INTRAVENOUS at 06:19

## 2023-03-11 RX ADMIN — Medication 10 ML: at 08:37

## 2023-03-11 RX ADMIN — FENTANYL CITRATE 50 MCG: 50 INJECTION, SOLUTION INTRAMUSCULAR; INTRAVENOUS at 11:35

## 2023-03-11 RX ADMIN — ROCURONIUM BROMIDE 40 MG: 10 INJECTION, SOLUTION INTRAVENOUS at 11:21

## 2023-03-11 RX ADMIN — ONDANSETRON HYDROCHLORIDE 4 MG: 2 SOLUTION INTRAMUSCULAR; INTRAVENOUS at 12:00

## 2023-03-11 RX ADMIN — SUGAMMADEX 180 MG: 100 INJECTION, SOLUTION INTRAVENOUS at 12:14

## 2023-03-11 RX ADMIN — AMLODIPINE BESYLATE 5 MG: 5 TABLET ORAL at 08:23

## 2023-03-11 RX ADMIN — FENTANYL CITRATE 50 MCG: 50 INJECTION, SOLUTION INTRAMUSCULAR; INTRAVENOUS at 11:21

## 2023-03-11 RX ADMIN — METOPROLOL SUCCINATE 100 MG: 100 TABLET, EXTENDED RELEASE ORAL at 08:23

## 2023-03-11 RX ADMIN — FENTANYL CITRATE 50 MCG: 50 INJECTION, SOLUTION INTRAMUSCULAR; INTRAVENOUS at 11:42

## 2023-03-11 RX ADMIN — PROPOFOL 150 MG: 10 INJECTION, EMULSION INTRAVENOUS at 11:21

## 2023-03-11 RX ADMIN — GLIPIZIDE 2.5 MG: 5 TABLET ORAL at 06:17

## 2023-03-11 RX ADMIN — SODIUM CHLORIDE, POTASSIUM CHLORIDE, SODIUM LACTATE AND CALCIUM CHLORIDE: 600; 310; 30; 20 INJECTION, SOLUTION INTRAVENOUS at 11:15

## 2023-03-11 RX ADMIN — MORPHINE SULFATE 1 MG: 2 INJECTION, SOLUTION INTRAMUSCULAR; INTRAVENOUS at 13:20

## 2023-03-11 RX ADMIN — MEPERIDINE HYDROCHLORIDE 12.5 MG: 25 INJECTION, SOLUTION INTRAMUSCULAR; INTRAVENOUS; SUBCUTANEOUS at 12:32

## 2023-03-11 RX ADMIN — LABETALOL HYDROCHLORIDE 5 MG: 5 INJECTION INTRAVENOUS at 11:58

## 2023-03-11 RX ADMIN — LIDOCAINE HYDROCHLORIDE 80 MG: 20 INJECTION, SOLUTION INTRAVENOUS at 11:21

## 2023-03-11 ASSESSMENT — PAIN SCALES - GENERAL: PAINLEVEL_OUTOF10: 6

## 2023-03-11 ASSESSMENT — PAIN DESCRIPTION - LOCATION: LOCATION: ABDOMEN

## 2023-03-11 ASSESSMENT — PAIN DESCRIPTION - ORIENTATION: ORIENTATION: RIGHT

## 2023-03-11 ASSESSMENT — PAIN DESCRIPTION - PAIN TYPE: TYPE: SURGICAL PAIN;ACUTE PAIN

## 2023-03-11 ASSESSMENT — PAIN DESCRIPTION - ONSET: ONSET: ON-GOING

## 2023-03-11 ASSESSMENT — PAIN DESCRIPTION - FREQUENCY: FREQUENCY: CONTINUOUS

## 2023-03-11 ASSESSMENT — PAIN DESCRIPTION - DESCRIPTORS: DESCRIPTORS: ACHING;DISCOMFORT;SORE

## 2023-03-11 NOTE — PROGRESS NOTES
Notified of post ERCP abdominal discomfort. Patient seen and examined. Reports epigastric/ mid sternal pain starting after ERCP today. States he had some dinner and felt worsening of the pain. Described it as dull and persistent, 7/10 pain. Abdomen soft, non distended, mildly tender to palpation. No rebound or guarding. Stat lipase, troponin, EKG, NPO and IV fluids. Start protonix and will PRN pain medication. Likely has mild post ERCP pancreatitis.      Discussed with chief resident and Dr. Geetha Payan

## 2023-03-11 NOTE — OP NOTE
Operative Note      Patient: Benoit Floyd  YOB: 1955  MRN: [id number]    Date of Procedure: 3/11/2023    Pre-Op Diagnosis: CHOLEDOCHOLITHIASIS WITH ACUTE CHOLECYSTITIS    Post-Op Diagnosis:  acute on chronic cholecystitis       Procedure(s):  CHOLECYSTECTOMY LAPAROSCOPIC    Surgeon(s):  Elizabeth Tatum MD    Assistant:   Resident: Trena White MD    Anesthesia: General    Estimated Blood Loss (mL): less than 50     Complications: None    Specimens:   ID Type Source Tests Collected by Time Destination   A : Gallbladder Tissue Gallbladder SURGICAL PATHOLOGY Elizabeth Tatum MD 3/11/2023 1157        Implants:  * No implants in log *      Drains: * No LDAs found *    Findings: chronic cholecystitis with acute component    Detailed Description of Procedure: The patient was brought to the operating room and positioned supine on the operating room table. Sequential compression devices were placed on the patient's lower extremities and were powered on. General anesthesia was administered and preoperative antibiotics were administered per the anesthetic record. The patient was prepped and draped in the usual sterile fashion and the procedure went forth with strict aseptic technique under maximal barrier precautions. Immediately prior to the procedure a time-out was called and the surgical checklist was reviewed and agreed upon by all present. A 5mm incision was made supraumbilically and a veress was inserted. The abdomen was insufflated with CO2 gas to a pressure of 15mmHg. A 5mm 30 degree laparoscope was used. There was good visualization and no injury was identified from our entry. A 12mm trocar was inserted subxiphoid and two 5mm trocars were placed at the RUQ. Adhesions of omentum were taken away from the gallbladder. The infundibulum was dissected proceeding in a lateral to medial fashion with a maryland grasper.  The critical view was obtained with the cystic duct and cystic artery with the bare area of the liver posteriorly. Clips were applied to both the incised with scissors. The gallbladder was taken from the cystic plate with electrocautery. It was placed into a bag and removed through the 12mm port. 2-0 vicryl was used to close the fascia of the 12mm port site with a bethany marian. Insufflation was removed. 4-0 vicryl was used to close the incisions in a subcuticular fashion. Dermabond was applied. Counts were correct x2. The patient was awoken from general anesthesia and brought to the PACU to recover. Plan for discharge from PACU. Electronically signed by Cleo Clement MD on 3/11/2023 at 12:25 PM    [address]   Attending Physician Statement:  I was present during the entire procedure and was actively supervising and directing the resident. There were no complications.     Marlise Ormond, MD, FACS  3/11/2023  11:09 PM

## 2023-03-11 NOTE — DISCHARGE SUMMARY
Hospital Medicine Discharge Summary    Patient ID: Adilia Belle      Patient's PCP: Jaycob Castillo MD    Admit Date: 3/9/2023     Discharge Date: 3/11/2023      Admitting Physician: Diandra Sabillon DO     Discharge Physician: Vito Leigh MD     Discharge condition: Stable    Discharge Diagnoses: Active Hospital Problems    Diagnosis Date Noted    Choledocholithiasis [K80.50] 03/09/2023     Priority: Medium    Preop cardiovascular exam [L21.758] 03/09/2023     Priority: Medium    Preoperative clearance [Z01.818] 03/09/2023     Priority: Medium   Acute cholecystitis status post lap cholecystectomy    The patient was seen and examined on day of discharge and this discharge summary is in conjunction with any daily progress note from day of discharge. Hospital Course:       Patient with a history of coronary artery disease status post CABG, hypertension presented with abdominal pain. Imaging consistent with choledocholithiasis with concern for obstruction. Surgery consulted. Patient was made n.p.o. and ERCP done. Resumed on diet overnight had worsening abdominal pain initial concern for pancreatitis status post ERCP ruled out with normal lipase. Patient was seen by surgery in the morning and scheduled for laparoscopic cholecystectomy. Tolerated procedure without any complications. Cleared for surgery for discharge. Pain medications given  Patient to follow-up with primary care and surgery on discharge. Exam:     /87   Pulse 76   Temp 97 °F (36.1 °C) (Temporal)   Resp 20   Ht 5' 9\" (1.753 m)   Wt 189 lb (85.7 kg)   SpO2 99%   BMI 27.91 kg/m²     General appearance: No apparent distress, appears stated age and cooperative. HEENT: Anicteric  Neck: No JVD. Respiratory: Clear to auscultation bilaterally  Cardiovascular: Regular rate and rhythm with normal S1/S2 without murmurs, rubs or gallops.   Abdomen: Soft, non-tender, non-distended with normal bowel sounds. Musculoskeletal: No clubbing, cyanosis or edema bilaterally. Brisk capillary refill. 2+ lower extremity pulses (dorsalis pedis). Skin:  No rashes    Neurologic: awake, alert and following commands     Consults:     IP CONSULT TO GENERAL SURGERY  IP CONSULT TO HOSPITALIST  IP CONSULT TO GENERAL SURGERY  IP CONSULT TO CARDIOLOGY  IP CONSULT TO GI    Significant Diagnostic Studies:   Reviewed    Disposition: Home    Discharge Instructions/Follow-up:    Follow-up with PCP surgery for LFT check wound check. Code Status:  Prior     Activity: activity as tolerated    Diet: cardiac diet    Labs: For convenience and continuity at follow-up the following most recent labs are provided:      CBC:    Lab Results   Component Value Date/Time    WBC 13.6 03/10/2023 07:22 AM    HGB 14.8 03/10/2023 07:22 AM    HCT 42.0 03/10/2023 07:22 AM     03/10/2023 07:22 AM       Renal:    Lab Results   Component Value Date/Time     03/10/2023 07:22 AM    K 4.1 03/10/2023 07:22 AM     03/10/2023 07:22 AM    CO2 26 03/10/2023 07:22 AM    BUN 17 03/10/2023 07:22 AM    CREATININE 1.2 03/10/2023 07:22 AM    CALCIUM 9.1 03/10/2023 07:22 AM       Discharge Medications:     Discharge Medication List as of 3/11/2023  7:16 PM             Details   oxyCODONE (ROXICODONE) 5 MG immediate release tablet Take 1 tablet by mouth every 6 hours as needed for Pain for up to 5 days. Intended supply: 5 days.  Take lowest dose possible to manage pain Max Daily Amount: 20 mg, Disp-20 tablet, R-0Print                Details   turmeric 500 MG CAPS Take 1,000 mg by mouth dailyHistorical Med      Multiple Vitamins-Minerals (THERAPEUTIC MULTIVITAMIN-MINERALS) tablet Take 1 tablet by mouth dailyHistorical Med      metoprolol succinate (TOPROL XL) 100 MG extended release tablet Take 100 mg by mouth dailyHistorical Med      atorvastatin (LIPITOR) 80 MG tablet Take 80 mg by mouth dailyHistorical Med      glipiZIDE (GLUCOTROL XL) 2.5 MG extended release tablet Take 1 tablet by mouth daily, Disp-30 tablet, R-3Normal      clopidogrel (PLAVIX) 75 MG tablet Take 75 mg by mouth dailyHistorical Med      aspirin 81 MG tablet Take 81 mg by mouth daily Historical Med             Time Spent on discharge is more than 45 minutes in the examination, evaluation, counseling and review of medications and discharge plan. Signed:     Ngozi Herndon MD   3/12/2023

## 2023-03-11 NOTE — ANESTHESIA PRE PROCEDURE
Department of Anesthesiology  Preprocedure Note       Name:  Jennifer Head   Age:  76 y.o.  :  1955                                          MRN:  [id number]         Date:  3/11/2023      Surgeon: Lord Tuttle):  Tiffany Chester MD    Procedure: Procedure(s):  CHOLECYSTECTOMY LAPAROSCOPIC, POSSIBLE OPEN, POSSIBLE CHOLANGIOGRAM    Medications prior to admission:   Prior to Admission medications    Medication Sig Start Date End Date Taking?  Authorizing Provider   turmeric 500 MG CAPS Take 1,000 mg by mouth daily   Yes Historical Provider, MD   Multiple Vitamins-Minerals (THERAPEUTIC MULTIVITAMIN-MINERALS) tablet Take 1 tablet by mouth daily   Yes Historical Provider, MD   metoprolol succinate (TOPROL XL) 100 MG extended release tablet Take 100 mg by mouth daily 22   Historical Provider, MD   atorvastatin (LIPITOR) 80 MG tablet Take 80 mg by mouth daily 22   Historical Provider, MD   glipiZIDE (GLUCOTROL XL) 2.5 MG extended release tablet Take 1 tablet by mouth daily 22   Cheyenne Joy MD   clopidogrel (PLAVIX) 75 MG tablet Take 75 mg by mouth daily    Historical Provider, MD   aspirin 81 MG tablet Take 81 mg by mouth daily     Historical Provider, MD       Current medications:    Current Facility-Administered Medications   Medication Dose Route Frequency Provider Last Rate Last Admin    meropenem (MERREM) 1,000 mg in sodium chloride 0.9 % 100 mL IVPB ([id number])  1,000 mg IntraVENous Author Leonid MD 33.3 mL/hr at 23 0836 1,000 mg at 23 0836    lactated ringers IV soln infusion   IntraVENous Continuous Eleno Zambrano  mL/hr at 23 0619 New Bag at 23 0619    pantoprazole (PROTONIX) 40 mg in sodium chloride (PF) 0.9 % 10 mL injection  40 mg IntraVENous BID Eleno Zambrano MD   40 mg at 23 0837    HYDROmorphone (DILAUDID) injection 0.5 mg  0.5 mg IntraVENous Q2H PRN Eleno Zambrano MD        Or    HYDROmorphone (DILAUDID) injection 1 mg  1 mg IntraVENous Q2H PRN Eleno Zambrano MD 1 mg at 03/10/23 2236    ketorolac (TORADOL) injection 15 mg  15 mg IntraVENous Q6H PRN Yury Middleton MD        amLODIPine [facility]) tablet 5 mg  5 mg Oral Daily Abhay Price, DO   5 mg at 03/11/23 8723    [Held by provider] aspirin chewable tablet 81 mg  81 mg Oral Daily Abhay Jordana, DO        [Held by provider] atorvastatin (LIPITOR) tablet 80 mg  80 mg Oral Daily Abhay Jordana, DO   80 mg at 03/09/23 1033    [Held by provider] clopidogrel (PLAVIX) tablet 75 mg  75 mg Oral Daily Abhay Jordana, DO        glipiZIDE (GLUCOTROL) tablet 2.5 mg  2.5 mg Oral QAM AC Abhay Jordana, DO   2.5 mg at 03/11/23 0617    metoprolol succinate (TOPROL XL) extended release tablet 100 mg  100 mg Oral Daily Abhay Price, DO   100 mg at 03/11/23 9975    sodium chloride flush 0.9 % injection 10 mL  10 mL IntraVENous 2 times per day Abhay Price, DO   10 mL at 03/11/23 0837    sodium chloride flush 0.9 % injection 10 mL  10 mL IntraVENous PRN Abhay Jordana, DO        0.9 % sodium chloride infusion   IntraVENous PRN Abhay Price, DO        [Held by provider] enoxaparin (LOVENOX) injection 40 mg  40 mg SubCUTAneous Daily Abhay Jordana, DO        promethazine (PHENERGAN) tablet 12.5 mg  12.5 mg Oral Q6H PRN Abhay Price, DO   12.5 mg at 03/10/23 1832    Or    ondansetron (ZOFRAN) injection 4 mg  4 mg IntraVENous Q6H PRN Abhay Jordana, DO   4 mg at 03/09/23 1930    polyethylene glycol (GLYCOLAX) packet 17 g  17 g Oral Daily PRN Abhay Jordana, DO        acetaminophen (TYLENOL) tablet 650 mg  650 mg Oral Q6H PRN Abhay Jordana, DO        Or    acetaminophen (TYLENOL) suppository 650 mg  650 mg Rectal Q6H PRN Abhay Jordana, DO           Allergies: Allergies   Allergen Reactions    Bee Venom Shortness Of Breath    Penicillins Shortness Of Breath and Rash    Adhesive Tape Other (See Comments)     Skin blisters.        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    Renal cell carcinoma of right kidney (HCC) C64.1    Stage 3b chronic kidney disease (HCC) N18.32    Choledocholithiasis K80.50    Preop cardiovascular exam Z01.810    Preoperative clearance Z01.818       Past Medical History:        Diagnosis Date    CAD (coronary artery disease)     Hyperglycemia     Hyperlipidemia     Hypertension     Prolonged emergence from general anesthesia     Renal mass     Supraventricular tachycardia ([facility] [address]        Past Surgical History:        Procedure Laterality Date    COLONOSCOPY      FOOT SURGERY  2013    FRACTURE SURGERY      HIP SURGERY Left     rebuilt fell off tractor    KIDNEY BIOPSY Right 11/07/2016    PATELLA SURGERY  2004    TONSILLECTOMY         Social History:    Social History     Tobacco Use    Smoking status: Never    Smokeless tobacco: Never   Substance Use Topics    Alcohol use:  No                                Counseling given: Not Answered      Vital Signs (Current):   Vitals:    03/10/23 1945 03/10/23 2345 03/11/23 0400 03/11/23 0828   BP: 126/83 122/81 125/79 117/79   Pulse: 81 85 82 74   Resp: 17 17 17    Temp: 36.4 °C (97.6 °F) 36.6 °C (97.9 °F) 36.4 °C (97.6 °F) 36.2 °C (97.1 °F)   TempSrc: Temporal Temporal Temporal Temporal   SpO2: 95% 96% 95% 94%   Weight:       Height:                                                  BP Readings from Last 3 Encounters:   03/11/23 117/79   01/05/23 98/66   12/08/22 122/74       NPO Status: BMI:   Wt Readings from Last 3 Encounters:   03/09/23 189 lb (85.7 kg)   01/05/23 189 lb (85.7 kg)   12/14/22 177 lb (80.3 kg)     Body mass index is 27.91 kg/m². CBC:   Lab Results   Component Value Date/Time    WBC 13.6 03/10/2023 07:22 AM    RBC 4.66 03/10/2023 07:22 AM    HGB 14.8 03/10/2023 07:22 AM    HCT 42.0 03/10/2023 07:22 AM    MCV 90.1 03/10/2023 07:22 AM    RDW 15.6 03/10/2023 07:22 AM     03/10/2023 07:22 AM       CMP:   Lab Results   Component Value Date/Time     03/10/2023 07:22 AM    K 4.1 03/10/2023 07:22 AM     03/10/2023 07:22 AM    CO2 26 03/10/2023 07:22 AM    BUN 17 03/10/2023 07:22 AM    CREATININE 1.2 03/10/2023 07:22 AM    GFRAA >60 02/15/2020 08:16 AM    LABGLOM >60 03/10/2023 07:22 AM    GLUCOSE 158 03/10/2023 07:22 AM    PROT 6.1 03/11/2023 05:13 AM    CALCIUM 9.1 03/10/2023 07:22 AM    BILITOT 5.2 03/11/2023 05:13 AM    ALKPHOS 525 03/11/2023 05:13 AM     03/11/2023 05:13 AM     03/11/2023 05:13 AM       POC Tests: No results for input(s): POCGLU, POCNA, POCK, POCCL, POCBUN, POCHEMO, POCHCT in the last 72 hours.     Coags:   Lab Results   Component Value Date/Time    PROTIME 11.1 02/15/2018 09:25 AM    INR 1.0 02/15/2018 09:25 AM    APTT 32.0 02/15/2018 09:25 AM       HCG (If Applicable): No results found for: PREGTESTUR, PREGSERUM, HCG, HCGQUANT     ABGs: No results found for: PHART, PO2ART, CIG2YYP, MDK6VIH, BEART, U5ISQDKD     Type & Screen (If Applicable):  No results found for: LABABO, LABRH    Drug/Infectious Status (If Applicable):  No results found for: HIV, HEPCAB    COVID-19 Screening (If Applicable):   Lab Results   Component Value Date/Time    COVID19 Not Detected 03/08/2023 04:36 PM           Anesthesia Evaluation  Patient summary reviewed and Nursing notes reviewed no history of anesthetic complications:   Airway: Mallampati: III  TM distance: >3 FB   Neck ROM: full  Mouth opening: > = 3 FB   Dental:          Pulmonary: Cardiovascular:  Exercise tolerance: good (>4 METS),   (+) hypertension:, angina:, CAD:, CABG/stent (x4 vessel 10/2022):,                   Neuro/Psych:               GI/Hepatic/Renal:            ROS comment: status post ERCP on 3/10 for choledocholithiasis    Hx partial right nephrectomy for benign mass per patient. Endo/Other:    (+) DiabetesType II DM, , .                 Abdominal:             Vascular: Other Findings:           Anesthesia Plan      general     ASA 3       Induction: intravenous. MIPS: Postoperative opioids intended, Prophylactic antiemetics administered and Postoperative trial extubation. Anesthetic plan and risks discussed with patient. Use of blood products discussed with patient whom. Plan discussed with attending. Interview and assessment was completed prior to note entry. CATINA Tejada - CRNA   3/11/2023      Pt seen, examined, chart reviewed, plan discussed.   Eloy Rutherford MD  3/11/2023  11:56 AM

## 2023-03-11 NOTE — ANESTHESIA POSTPROCEDURE EVALUATION
Department of Anesthesiology  Postprocedure Note    Patient: Usama Fernandez  MRN: [id number]  [place]: 1955  Date of evaluation: 3/11/2023      Procedure Summary     Date: 03/11/23 Room / Location: [facility] / [facility]    Anesthesia Start: 1115 Anesthesia Stop: 1231    Procedure: CHOLECYSTECTOMY LAPAROSCOPIC Diagnosis:       Choledocholithiasis with acute cholecystitis      (CHOLEDOCHOLITHIASIS WITH ACUTE CHOLECYSTITIS)    Surgeons: Rula Hatfield MD Responsible Provider: Trina Colin MD    Anesthesia Type: general ASA Status: 3          Anesthesia Type: No value filed.     Irene Phase I: Irene Score: 8    Irene Phase II:        Anesthesia Post Evaluation    Patient location during evaluation: PACU  Patient participation: complete - patient participated  Level of consciousness: awake  Pain score: 3  Airway patency: patent  Nausea & Vomiting: no nausea and no vomiting  Complications: no  Cardiovascular status: blood pressure returned to baseline  Respiratory status: acceptable  Hydration status: euvolemic

## 2023-03-11 NOTE — PROGRESS NOTES
GENERAL SURGERY  DAILY PROGRESS NOTE  3/11/2023  Chief Complaint   Patient presents with    Emesis     Emesis while driving today, denies cp/sob         Subjective:  Patient had increasing epigastric pain last night bilirubin only slightly decreased from yesterday pain much improved    Objective:  /79   Pulse 74   Temp 97.1 °F (36.2 °C) (Temporal)   Resp 17   Ht 5' 9\" (1.753 m)   Wt 189 lb (85.7 kg)   SpO2 94%   BMI 27.91 kg/m²     GENERAL:  Laying in bed, awake, alert, cooperative, no apparent distress  HEAD: Normocephalic, atraumatic  EYES: No sclera icterus, pupils equal  LUNGS:  No increased work of breathing  CARDIOVASCULAR:  regular rate  ABDOMEN:  Soft, TTP RUQ, nondistended  EXTREMITIES: No edema or swelling  SKIN: Warm and dry, no rashes or lesions    Assessment/Plan:  76 y.o. male with choledocholithiasis. Appreciate advanced endoscopies with management and procedures.   Status  post ERCP 3/10   Continue antibiotics for prophylaxis of his common duct  BMP unremarkable LFTs essentially stable bilirubin 5 4-5.2  Planning lap alison tomorrow-I spoke with the patient and wife on the phone    Electronically signed by Anibal Monique MD on 3/11/2023 at 11:33 AM

## 2023-03-11 NOTE — OP NOTE
Operative Note      Patient: Raf Esteban  YOB: 1955  MRN: [id number]    Date of Procedure: 3/10/2023    Pre-Op Diagnosis: Cholecystitis [K81.9]    Post-Op Diagnosis:  Choledocholithiasis       Procedure(s):  ERCP ENDOSCOPIC RETROGRADE CHOLANGIOPANCREATOGRAPHY  ERCP SPHINCTER/PAPILLOTOMY  ERCP STONE REMOVAL  ERCP STENT INSERTION    Surgeon(s):  Brooke Paz MD    Assistant:   * No surgical staff found *    Anesthesia: Monitor Anesthesia Care    Estimated Blood Loss (mL): less than 50     Complications: None    Specimens:   * No specimens in log *    Implants:  Implant Name Type Inv. Item Serial No.  Lot No. LRB No. Used Action   STENT BILI AD L100MM DIA3. 3MM RX - [id number]  STENT BILI AD L100MM DIA3. 3MM RX  [id number] N/A 1 Implanted         Drains: * No LDAs found *    Findings: Small stones removed and plastic stent placed    Detailed Description of Procedure:      INDICATION: This is a 69-year-old male with radiologic evidence of choledocholithiasis and abdominal pain. The patient agreed to undergo the ERCP. The risks, benefits, and alternatives were explained to the patient for the procedure including bleeding, infection, perforation, and pancreatitis. The patient understood and agreed to proceed. DESCRIPTION OF PROCEDURE: The patient was brought to the operating room and he underwent [facility] anesthesia by the anesthesia team.  He was then placed in the left lateral decubitus position. The flexible duodenoscope was inserted down  the oropharynx and passed down the esophagus into the stomach and into the  duodenum. The papilla was identified. Next, using a sphincterotome with a Jagwire, I cannulated the common bile duct. Contrast injection revealed adequate cannulation of the common bile duct. There was a small filling defect in the distal common bile duct. Next, a sphincterotomy was performed with the papillotome.   A 9 to 12 mm balloon was then inserted and used to sweep the common bile duct. A small stone was removed. There were multiple stones noted in the gallbladder on cholangiogram.  Cholangiogram did not however reveal any other abnormality. I elected to then place a stent in the common bile duct. A 10 Croatian by 10 cm plastic stent was advanced without difficulty. There was good drainage through the stent which was in adequate position on fluoroscopy. The scope was then withdrawn. The patient tolerated the procedure well.       PLAN: laparoscopic cholecystectomy per general surgery      Electronically signed by Brooke Paz MD

## 2023-03-11 NOTE — PROGRESS NOTES
Hospitalist Progress Note      Synopsis: Patient admitted on 3/9/2023   Patient with a history of coronary artery disease status post CABG, hypertension presented with abdominal pain. Imaging consistent with choledocholithiasis with concern for obstruction. Surgery consulted. N.p.o. for possible ERCP    Subjective  Patient seen at bedside. Status post ERCP. Complaining of abdominal pain overnight. Initial concerns for acute pancreatitis after ERCP lipase done negative. No abdominal pain. No nausea no vomiting no diarrhea. Exam:  /83   Pulse 74   Temp 97.8 °F (36.6 °C) (Temporal)   Resp 18   Ht 5' 9\" (1.753 m)   Wt 189 lb (85.7 kg)   SpO2 95%   BMI 27.91 kg/m²   General appearance: No apparent distress, appears stated age and cooperative. HEENT: Anicteric  Neck: No JVD. Respiratory: Clear to auscultation bilaterally  Cardiovascular: Regular rate and rhythm with normal S1/S2 without murmurs, rubs or gallops. Abdomen: Soft, non-tender, non-distended with normal bowel sounds. Musculoskeletal: No clubbing, cyanosis or edema bilaterally. Brisk capillary refill. 2+ lower extremity pulses (dorsalis pedis).    Skin:  No rashes    Neurologic: awake, alert and following commands     Medications:  Reviewed    Infusion Medications    sodium chloride      lactated ringers IV soln 150 mL/hr at 03/11/23 0619    sodium chloride       Scheduled Medications    sodium chloride flush  5-40 mL IntraVENous 2 times per day    meropenem  1,000 mg IntraVENous Q8H    pantoprazole (PROTONIX) 40 mg injection  40 mg IntraVENous BID    amLODIPine  5 mg Oral Daily    [Held by provider] aspirin  81 mg Oral Daily    [Held by provider] atorvastatin  80 mg Oral Daily    [Held by provider] clopidogrel  75 mg Oral Daily    glipiZIDE  2.5 mg Oral QAM AC    metoprolol succinate  100 mg Oral Daily    sodium chloride flush  10 mL IntraVENous 2 times per day    [Held by provider] enoxaparin  40 mg SubCUTAneous Daily     PRN Meds: sodium chloride flush, sodium chloride, meperidine, morphine, morphine, HYDROmorphone **OR** HYDROmorphone, ketorolac, sodium chloride flush, sodium chloride, promethazine **OR** ondansetron, polyethylene glycol, acetaminophen **OR** acetaminophen    I/O    Intake/Output Summary (Last 24 hours) at 3/11/2023 1332  Last data filed at 3/11/2023 1300  Gross per 24 hour   Intake 2430.4 ml   Output 220 ml   Net 2210.4 ml       Labs:   Recent Labs     03/08/23  1636 03/10/23  0722   WBC 10.9 13.6*   HGB 14.2 14.8   HCT 42.4 42.0    173       Recent Labs     03/08/23  1636 03/10/23  0722    138   K 3.5 4.1    100   CO2 23 26   BUN 22 17   CREATININE 1.1 1.2   CALCIUM 9.5 9.1       Recent Labs     03/08/23  1636 03/09/23  0438 03/10/23  0722 03/10/23  2215 03/11/23  0513   PROT 6.9 6.8 6.3*  --  6.1*   ALKPHOS 482* 411* 477*  --  525*   * 238* 173*  --  165*   * 140* 102*  --  134*   BILITOT 4.6* 5.1* 5.4*  --  5.2*   LIPASE 27  --   --  36 35       No results for input(s): INR in the last 72 hours. No results for input(s): Jeanette Hoffman in the last 72 hours. Chronic labs:  Lab Results   Component Value Date    CHOL 91 12/06/2022    TRIG 119 12/06/2022    HDL 29 12/06/2022    LDLCALC 38 12/06/2022    PSA 1.39 11/16/2016    INR 1.0 02/15/2018    LABA1C 6.7 (H) 02/15/2020       Radiology:  Imaging studies reviewed today. ASSESSMENT:  Choledocholithiasis  Acute cholecystitis  Abdominal pain  Coronary artery disease status post CABG  Hypertension         PLAN:  Continue inpatient admission  Continue IV antibiotics  Monitor CBC CMP. Pain control  N.p.o. for possible lap alison today. IV fluids. Diet: ADULT DIET; Regular  Code Status: Full Code  PT/OT Eval Status:     DVT Prophylaxis:     Recommended disposition at discharge:    Not yet ready for discharge today.   For laparoscopic or open cholecystectomy today.  +++++++++++++++++++++++++++++++++++++++++++++++++  Tochukwu Annetta Martinez, [address] [place] [address] [address].  +++++++++++++++++++++++++++++++++++++++++++++++++  NOTE: This report was transcribed using voice recognition software. Every effort was made to ensure accuracy; however, inadvertent computerized transcription errors may be present.

## 2023-03-11 NOTE — PROGRESS NOTES
Surgical resident notified of abdominal pain and patient stating \"I just don't feel right\".   Stat Lipase - lab contacted

## 2023-03-11 NOTE — PROGRESS NOTES
GENERAL SURGERY  DAILY PROGRESS NOTE  3/11/2023    Subjective:  Doing well this morning. Abdominal pain is largely improved. No nausea or vomiting. Objective:  Last 24Hrs  Temp  Av.5 °F (36.4 °C)  Min: 97 °F (36.1 °C)  Max: 97.9 °F (36.6 °C)  Resp  Av.6  Min: 14  Max: 18  Pulse  Av.6  Min: 70  Max: 86  Systolic (19GWR), LSP:649 , Min:117 , EXJ:816     Diastolic (35LKE), PRE:46, Min:78, Max:88    SpO2  Av.1 %  Min: 94 %  Max: 97 %    I/O last 3 completed shifts: In: 1630.4 [P.O.:60; I.V.:1460.4; IV Piggyback:110]  Out: -       General: Lying in bed, NAD  Cardiovascular: Warm throughout  Respiratory: Equal chest rise bilaterally, no retractions, no audible wheezing  Abdomen: soft, NTTP throughout, ND  Skin: No jaundice      CBC  Recent Labs     23  1636 03/10/23  0722   WBC 10.9 13.6*   RBC 4.71 4.66   HGB 14.2 14.8   HCT 42.4 42.0   MCV 90.0 90.1   MCH 30.1 31.8   MCHC 33.5 35.2*   RDW 15.3* 15.6*    173   MPV 9.9 10.5       CMP  Recent Labs     23  1636 23  0438 03/10/23  0722 23  0513     --  138  --    K 3.5  --  4.1  --      --  100  --    CO2 23  --  26  --    BUN 22  --  17  --    CREATININE 1.1  --  1.2  --    GLUCOSE 148*  --  158*  --    CALCIUM 9.5  --  9.1  --    PROT 6.9 6.8 6.3* 6.1*   LABALBU 4.0 3.7 3.6 3.1*   BILITOT 4.6* 5.1* 5.4* 5.2*   ALKPHOS 482* 411* 477* 525*   * 140* 102* 134*   * 238* 173* 165*         Assessment/Plan:  76 y.o. male status post ERCP on 3/10 for choledocholithiasis. He does not have any abdominal pain today. We will plan on the following    Laparoscopic possible open cholecystectomy  N.p.o., IV fluids    Risks of the procedure were discussed with the patient at length including bleeding, infection, demonstrating structures nearby tissues, need for additional operations or procedures in the future, death, stroke, MI. The patient was agreeable to proceed.       Ole Lopez MD  General Surgery Resident, PGY-4    Electronically signed on 3/11/2023 at 9:39 AM

## 2023-03-11 NOTE — DISCHARGE INSTRUCTIONS
Endoscopic Retrograde Cholangiopancreatogram (ERCP): What to Expect at [facility]  After you have an endoscopic retrograde cholangiopancreatogram (ERCP), you probably will stay at the hospital or clinic for 1 to 2 hours. This will allow the medicine to wear off. You will be able to go home after your doctor or a nurse checks to make sure you are not having any problems. If you stay in the hospital overnight, you may go home the next day. You may have a sore throat for a day or two after the procedure. This care sheet gives you a general idea about how long it will take for you to recover. But each person recovers at a different pace. Follow the steps below to get better as quickly as possible. How can you care for yourself at home? Activity    Rest as much as you need to after you go home. You should be able to go back to your usual activities the day after the procedure. Diet    Follow your doctor's directions for eating after the procedure. Drink plenty of fluids (unless your doctor tells you not to). Medicines    Your doctor will tell you if and when you can restart your medicines. He or she will also give you instructions about taking any new medicines. If you stopped taking aspirin or some other blood thinner, your doctor will tell you when to start taking it again. If you have a sore throat the next day, use an over-the-counter spray to numb your throat. Be safe with medicines. Read and follow all instructions on the label. Follow-up care is a key part of your treatment and safety. Be sure to make and go to all appointments, and call your doctor if you are having problems. It's also a good idea to know your test results and keep a list of the medicines you take. When should you call for help? Call 911 anytime you think you may need emergency care. For example, call if:    You passed out (lost consciousness). Your stools are maroon or very bloody.      You have trouble breathing. Call your doctor now or go to the emergency room if:    You have new or worse belly pain. You have pain that does not get better after you take pain medicine. You have a fever. You cannot pass stools or gas. You are sick to your stomach or cannot hold down fluids. You have blood in your stools. Watch closely for changes in your health, and be sure to contact your doctor if:    Your throat still hurts after a day or two. You do not get better as expected. Where can you learn more? Go to http://www.woods.com/ and enter W326 to learn more about \"Endoscopic Retrograde Cholangiopancreatogram (ERCP): What to Expect at Home. \"  Current as of: June 6, 2022               Content Version: 13.5  © 5201-4937 [employer]. Care instructions adapted under license by [address] [place] [address]. If you have questions about a medical condition or this instruction, always ask your healthcare professional. [place] [address] any warranty or liability for your use of this information. [address] [facility] [address] may be drowsy or lightheaded after receiving sedation or anesthesia. A responsible person should be with you for the next 24 hours. Please follow the instructions checked below:    DIET INSTRUCTIONS:  [x]Start with light diet and progress to your normal diet as you feel like eating. If you experience nausea or repeated episodes of vomiting which persist beyond 12-24 hours, notify your doctor. []Other     ACTIVITY INSTRUCTIONS:  [x]Rest today. Increase activity as tolerated    [x]No heavy lifting or strenuous activity for 2 weeks   [x]No driving for 1 day or while on narcotics  []Other     WOUND/DRESSING INSTRUCTIONS:  Always ensure you and your care giver clean hands before and after caring for the wound.   [x]May shower      []May bathe      [x]Derma bond dressing-Do not apply lotion, gel, or liquid to wound while the derma bond is in place. []Other         MEDICATION INSTRUCTIONS:    [x]Prescriptions sent with you. Use as directed. When taking pain medications, you may experience dizziness or drowsiness. Do not drink alcohol or drive when taking these medications. [x]You may take a non-prescription headache remedy, preferably one that does not contain aspirin. Other Instructions:      FOLLOW-UP CARE:  [x]Call the office for follow-up appointment as needed    Call physician if they or any other problems occur:  Fever over 101°    Redness, swelling, hardness or warmth at the operative site  Unrelieved nausea     Foul smelling or cloudy drainage at the operative site   Unrelieved pain    Blood soaked dressing.  (Some oozing may be normal)                                    FURTHER DISCHARGE INSTRUCTIONS  Pt is hemodynamically stable after procedure and can be discharged today  Please continue prescribed medications  Please follow up with PCP within a week of discharge  Please follow up General Surgery outpatient  Please go to the nearest ER for worsening of symptoms

## 2023-03-12 LAB
EKG ATRIAL RATE: 73 BPM
EKG P AXIS: 56 DEGREES
EKG P-R INTERVAL: 134 MS
EKG Q-T INTERVAL: 412 MS
EKG QRS DURATION: 88 MS
EKG QTC CALCULATION (BAZETT): 453 MS
EKG R AXIS: -40 DEGREES
EKG T AXIS: 28 DEGREES
EKG VENTRICULAR RATE: 73 BPM

## 2023-03-12 PROCEDURE — 93010 ELECTROCARDIOGRAM REPORT: CPT | Performed by: INTERNAL MEDICINE

## 2023-03-13 ENCOUNTER — TELEPHONE (OUTPATIENT)
Dept: FAMILY MEDICINE CLINIC | Age: 68
End: 2023-03-13

## 2023-03-13 NOTE — TELEPHONE ENCOUNTER
Care Transitions Initial Follow Up Call    Outreach made within 2 business days of discharge: Yes    Patient: Asim Mora Patient : 1955   MRN: [id number]  Reason for Admission: There are no discharge diagnoses documented for the most recent discharge.  Discharge Date: 3/11/23       Spoke with: Asim Mora    Discharge department/facility: [facility] Interactive Patient Contact:  Was patient able to fill all prescriptions: Yes  Was patient instructed to bring all medications to the follow-up visit: Yes  Is patient taking all medications as directed in the discharge summary? Patient states didn't feel the need to take pain medication as of yet.  Does patient understand their discharge instructions: Yes  Does patient have questions or concerns that need addressed prior to 7-14 day follow up office visit: no    Scheduled appointment with PCP within 7-14 days  Made Hospital follow-up for Marybeth office  Follow Up  Future Appointments   Date Time Provider Department Center   2023  2:15 PM MD [facility]       Darlene Arevalo MA

## 2023-03-16 ENCOUNTER — OFFICE VISIT (OUTPATIENT)
Dept: FAMILY MEDICINE CLINIC | Age: 68
End: 2023-03-16
Payer: MEDICARE

## 2023-03-16 VITALS
BODY MASS INDEX: 26.73 KG/M2 | DIASTOLIC BLOOD PRESSURE: 70 MMHG | TEMPERATURE: 97.8 F | HEART RATE: 70 BPM | OXYGEN SATURATION: 97 % | WEIGHT: 181 LBS | SYSTOLIC BLOOD PRESSURE: 118 MMHG

## 2023-03-16 DIAGNOSIS — I10 HYPERTENSION, UNSPECIFIED TYPE: ICD-10-CM

## 2023-03-16 DIAGNOSIS — R73.9 HYPERGLYCEMIA: ICD-10-CM

## 2023-03-16 DIAGNOSIS — K80.62 CALCULUS OF GALLBLADDER AND BILE DUCT WITH ACUTE CHOLECYSTITIS WITHOUT OBSTRUCTION: Primary | ICD-10-CM

## 2023-03-16 DIAGNOSIS — I25.110 CORONARY ARTERY DISEASE INVOLVING NATIVE CORONARY ARTERY OF NATIVE HEART WITH UNSTABLE ANGINA PECTORIS (HCC): ICD-10-CM

## 2023-03-16 PROCEDURE — 99213 OFFICE O/P EST LOW 20 MIN: CPT | Performed by: FAMILY MEDICINE

## 2023-03-16 PROCEDURE — 3074F SYST BP LT 130 MM HG: CPT | Performed by: FAMILY MEDICINE

## 2023-03-16 PROCEDURE — 1123F ACP DISCUSS/DSCN MKR DOCD: CPT | Performed by: FAMILY MEDICINE

## 2023-03-16 PROCEDURE — 3078F DIAST BP <80 MM HG: CPT | Performed by: FAMILY MEDICINE

## 2023-03-16 SDOH — ECONOMIC STABILITY: INCOME INSECURITY: HOW HARD IS IT FOR YOU TO PAY FOR THE VERY BASICS LIKE FOOD, HOUSING, MEDICAL CARE, AND HEATING?: NOT HARD AT ALL

## 2023-03-16 SDOH — ECONOMIC STABILITY: HOUSING INSECURITY
IN THE LAST 12 MONTHS, WAS THERE A TIME WHEN YOU DID NOT HAVE A STEADY PLACE TO SLEEP OR SLEPT IN A SHELTER (INCLUDING NOW)?: NO

## 2023-03-16 SDOH — ECONOMIC STABILITY: FOOD INSECURITY: WITHIN THE PAST 12 MONTHS, THE FOOD YOU BOUGHT JUST DIDN'T LAST AND YOU DIDN'T HAVE MONEY TO GET MORE.: NEVER TRUE

## 2023-03-16 SDOH — ECONOMIC STABILITY: FOOD INSECURITY: WITHIN THE PAST 12 MONTHS, YOU WORRIED THAT YOUR FOOD WOULD RUN OUT BEFORE YOU GOT MONEY TO BUY MORE.: NEVER TRUE

## 2023-03-16 ASSESSMENT — ENCOUNTER SYMPTOMS
RESPIRATORY NEGATIVE: 1
GASTROINTESTINAL NEGATIVE: 1
ALLERGIC/IMMUNOLOGIC NEGATIVE: 1
EYES NEGATIVE: 1

## 2023-03-16 NOTE — PATIENT INSTRUCTIONS
Keep the wound dry and clean  Continue same medication  Follow-up with the surgeon  Return to clinic earlier if any problems

## 2023-03-16 NOTE — PROGRESS NOTES
OFFICE PROGRESS NOTE      SUBJECTIVE:        Patient ID:   Leander Frey is a 76 y.o. male who presents for   Chief Complaint   Patient presents with    Follow-Up from Hospital     Here for recheck following recent cholecystectomy. Reports glucose was 160 this am.         Patient here following after the hospital stay    HPI:   States that he was being abdominal pain  Nausea vomit  Went to the ER  Was found to have cholelithiasis with cystitis  Was admitted and operated  Patient states he had blood sugar high in the hospital  Has been high at home  No fever        Prior to Visit Medications    Medication Sig Taking? Authorizing Provider   turmeric 500 MG CAPS Take 1,000 mg by mouth daily Yes Historical Provider, MD   Multiple Vitamins-Minerals (THERAPEUTIC MULTIVITAMIN-MINERALS) tablet Take 1 tablet by mouth daily Yes Historical Provider, MD   metoprolol succinate (TOPROL XL) 100 MG extended release tablet Take 100 mg by mouth daily Yes Historical Provider, MD   atorvastatin (LIPITOR) 80 MG tablet Take 80 mg by mouth daily Yes Historical Provider, MD   glipiZIDE (GLUCOTROL XL) 2.5 MG extended release tablet Take 1 tablet by mouth daily Yes Kaye Dover MD   clopidogrel (PLAVIX) 75 MG tablet Take 75 mg by mouth daily Yes Historical Provider, MD   aspirin 81 MG tablet Take 81 mg by mouth daily  Yes Historical Provider, MD   oxyCODONE (ROXICODONE) 5 MG immediate release tablet Take 1 tablet by mouth every 6 hours as needed for Pain for up to 5 days. Intended supply: 5 days.  Take lowest dose possible to manage pain Max Daily Amount: 20 mg  Patient not taking: Reported on 3/16/2023  Eileen Moore MD      Social History     Socioeconomic History    Marital status:      Spouse name: None    Number of children: None    Years of education: None    Highest education level: None   Tobacco Use    Smoking status: Never    Smokeless tobacco: Never   Vaping Use Vaping Use: Never used   Substance and Sexual Activity    Alcohol use: No    Drug use: No     Social Determinants of Health     Financial Resource Strain: Low Risk     Difficulty of Paying Living Expenses: Not hard at all   Food Insecurity: No Food Insecurity    Worried About Running Out of Food in the Last Year: Never true    Ran Out of Food in the Last Year: Never true   Transportation Needs: Unknown    Lack of Transportation (Non-Medical): No   Housing Stability: Unknown    Unstable Housing in the Last Year: No       I have reviewed Asim's allergies, medications, problem list, medical, social and family history and have updated as needed in the electronic medical record  Review Of Systems:    Review of Systems   Constitutional: Negative. HENT: Negative. Eyes: Negative. Respiratory: Negative. Cardiovascular: Negative. Gastrointestinal: Negative. Endocrine: Negative. Genitourinary: Negative. Musculoskeletal: Negative. Allergic/Immunologic: Negative. Neurological: Negative. Hematological: Negative. Psychiatric/Behavioral: Negative. OBJECTIVE:     VS:  Wt Readings from Last 3 Encounters:   03/16/23 181 lb (82.1 kg)   03/09/23 189 lb (85.7 kg)   01/05/23 189 lb (85.7 kg)     Temp Readings from Last 3 Encounters:   03/16/23 97.8 °F (36.6 °C) (Infrared)   03/11/23 97 °F (36.1 °C) (Temporal)   01/05/23 97.1 °F (36.2 °C) (Infrared)     BP Readings from Last 3 Encounters:   03/16/23 118/70   03/11/23 134/87   01/05/23 98/66        Physical Exam  Constitutional:       Appearance: He is well-developed. HENT:      Head: Normocephalic and atraumatic. Eyes:      Conjunctiva/sclera: Conjunctivae normal.      Pupils: Pupils are equal, round, and reactive to light. Cardiovascular:      Rate and Rhythm: Normal rate and regular rhythm. Heart sounds: Normal heart sounds. Pulmonary:      Effort: Pulmonary effort is normal.      Breath sounds: Normal breath sounds. Abdominal:      General: Bowel sounds are normal.      Palpations: Abdomen is soft. Musculoskeletal:         General: Normal range of motion. Cervical back: Normal range of motion and neck supple. Skin:     General: Skin is warm and dry. Neurological:      Mental Status: He is alert and oriented to person, place, and time.    Psychiatric:         Behavior: Behavior normal.          Labs :    Lab Results   Component Value Date    WBC 13.6 (H) 03/10/2023    HGB 14.8 03/10/2023    HCT 42.0 03/10/2023     03/10/2023    CHOL 91 12/06/2022    TRIG 119 12/06/2022    HDL 29 12/06/2022     (H) 03/11/2023     (H) 03/11/2023     03/10/2023    K 4.1 03/10/2023     03/10/2023    CREATININE 1.2 03/10/2023    BUN 17 03/10/2023    CO2 26 03/10/2023    PSA 1.39 11/16/2016    INR 1.0 02/15/2018    LABA1C 6.7 (H) 02/15/2020    LABMICR <12.0 02/15/2020     No results found for: VOL, APPEARANCE, COLORU, LABSPEC, LABPH, LEUKBLD, NITRU, GLUCOSEU, KETUA, UROBILINOGEN, KETUA, [address], BILIRUBINUR, OCBU  Lab Results   Component Value Date/Time    PSA 1.39 11/16/2016 12:00 PM         Controlled Substances Monitoring:                                    ASSESSMENT     Patient Active Problem List    Diagnosis Date Noted    Choledocholithiasis 03/09/2023    Preop cardiovascular exam 03/09/2023    Preoperative clearance 03/09/2023    Lung nodule 10/07/2022    Diabetes mellitus without complication ([address] 20/99/5280    H/O partial nephrectomy 06/10/2021    Renal cell carcinoma of right kidney ([address] 06/03/2019    Stage 3b chronic kidney disease ([address] 12/23/2018    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 Calculus of gallbladder and bile duct with acute cholecystitis without obstruction  -     CBC with Auto Differential; Future  -     Comprehensive Metabolic Panel; Future  2. Hypertension, unspecified type  -     Comprehensive Metabolic Panel; Future  3. Hyperglycemia  -     Comprehensive Metabolic Panel; Future  4. Coronary artery disease involving native coronary artery of native heart with unstable angina pectoris ([address]     PLAN:   Keep the wound dry and clean  Follow-up with the surgeon  Low-sodium low-fat low-carb diet  Blood sugar should be monitored  Blood sugar medication to stay the same  Bring the log of the blood sugar  Return to clinic earlier if any problems  All instruction given to the patient        Patient Instructions   Keep the wound dry and clean  Continue same medication  Follow-up with the surgeon  Return to clinic earlier if any problems    Return in about 2 weeks (around 3/30/2023) for Medication Check, test results. Rosa Melvin reviewed my findings and recommendations with Yruy Olson.     Electronicallysigned by Symone Nguyen MD on 3/16/23 at 10:18 AM EDT

## 2023-03-23 ENCOUNTER — PREP FOR PROCEDURE (OUTPATIENT)
Dept: SURGERY | Age: 68
End: 2023-03-23

## 2023-03-23 ENCOUNTER — OFFICE VISIT (OUTPATIENT)
Dept: SURGERY | Age: 68
End: 2023-03-23
Payer: MEDICARE

## 2023-03-23 ENCOUNTER — TELEPHONE (OUTPATIENT)
Dept: SURGERY | Age: 68
End: 2023-03-23

## 2023-03-23 VITALS
OXYGEN SATURATION: 99 % | HEIGHT: 69 IN | HEART RATE: 71 BPM | BODY MASS INDEX: 26.81 KG/M2 | DIASTOLIC BLOOD PRESSURE: 74 MMHG | SYSTOLIC BLOOD PRESSURE: 115 MMHG | WEIGHT: 181 LBS | RESPIRATION RATE: 16 BRPM

## 2023-03-23 DIAGNOSIS — Z09 POSTOP CHECK: Primary | ICD-10-CM

## 2023-03-23 DIAGNOSIS — Z09 POSTOPERATIVE EXAMINATION: ICD-10-CM

## 2023-03-23 PROCEDURE — 99024 POSTOP FOLLOW-UP VISIT: CPT | Performed by: SURGERY

## 2023-03-23 PROCEDURE — 99212 OFFICE O/P EST SF 10 MIN: CPT | Performed by: SURGERY

## 2023-03-23 RX ORDER — SODIUM, POTASSIUM,MAG SULFATES 17.5-3.13G
1 SOLUTION, RECONSTITUTED, ORAL ORAL 2 TIMES DAILY
Qty: 1 EACH | Refills: 0 | Status: SHIPPED | OUTPATIENT
Start: 2023-03-23 | End: 2023-03-24

## 2023-03-23 NOTE — TELEPHONE ENCOUNTER
Prior Authorization Form:      DEMOGRAPHICS:                     Patient Name:  Ludwin Germain  Patient :  1955            Insurance:  Payor: [facility] MEDICARE / Plan: [place]  / Product Type: *No Product type* /   Insurance ID Number:    Payer/Plan Subscr  Sex Relation Sub. Ins. ID Effective Group Num   1.  [address] 1955 Male Self [id number]                                    [id number]         DIAGNOSIS & PROCEDURE:                       Procedure/Operation: Colonoscopy        CPT Code: 87846    Diagnosis:  Screening    ICD10 Code: Z12.11    Location:  [facility]      Surgeon:  Glenna Casper MD      [facility] INFORMATION:                          Date: 2023    Time: 1015              Anesthesia:  MAC/TIVA                                                       Status:  Outpatient            Electronically signed by Vashti Rivas MA on 3/23/2023 at 1:59 PM

## 2023-03-23 NOTE — PATIENT INSTRUCTIONS
SUPREP followed immediately by at least 8 oz of clear liquids. STOP ALL CLEAR LIQUIDS 2 HOURS PRIOR TO SCHEDULED TIME  If any blood pressure medications or heart medications are due in the morning, you should take them with a sip of water. Instructions for Clear liquid diet  Definition  A clear liquid diet consists of clear liquids, such as water, broth and plain gelatin, that are easily digested and leave no undigested residue in your intestinal tract. Your doctor may prescribe a clear liquid diet before certain medical procedures or if you have certain digestive problems. Because a clear liquid diet can't provide you with adequate calories and nutrients, it shouldn't be continued for more than a few days. Purpose  A clear liquid diet is often used before tests, procedures or surgeries that require no food in your stomach or intestines, such as before colonoscopy. It may also be recommended as a short-term diet if you have certain digestive problems, such as nausea, vomiting or diarrhea, or after certain types of surgery. Diet details  A clear liquid diet helps maintain adequate hydration, provides some important electrolytes, such as sodium and potassium, and gives some energy at a time when a full diet isn't possible or recommended. The following foods are allowed in a clear liquid diet:   Plain water   Fruit juices without pulp, such as apple juice, grape juice or cranberry juice   Strained lemonade or fruit punch   Clear, fat-free broth (bouillon or consomme)   Clear sodas   Plain gelatin   Honey   Ice pops without bits of fruit or fruit pulp   Tea or coffee without milk or cream    Any foods not on the above list should be avoided. Also, for certain tests, such as colon exams, your doctor may ask you to avoid liquids or gelatin with red coloring.      A typical menu on the clear liquid diet may look like this:     Breakfast:  1 glass fruit juice  1 cup coffee or tea (without dairy products)  1

## 2023-03-23 NOTE — PROGRESS NOTES
Postop Progress Note    Subjective    Tamsen Rising presents to the office 2 weeks  following lap cholecystectomy. Eating a regular diet without difficulty. Bowel movements are normal. Patient reports wound healing well. The patient is not having any pain. Objective    Vitals:    03/23/23 0850   BP: 115/74   Pulse: 71   Resp: 16   SpO2: 99%     General: alert, cooperative, and no distress  Incision: healing well, no hernia, well approximated    Assessment    Doing well postoperatively. Plan   1. Continue any current medications  2. Wound care discussed  3. Wound/Incision: healing well  4. Disposition:   No heavy lifting. May return to work on April 23 with no restrictions. 5. Diet: low fat, low cholesterol diet  6. Follow up:   Patient will need a screening colonoscopy. He is due. No family history of colon cancer  We will schedule in June July 2023  2 days of clears  Suprep  I discussed the options for colorectal cancer screening with the patient including options of fecal occult blood testing with flexible sigmoidoscopy or air contrast barium enema. I also discussed the risks and benefits of colonoscopy with possible biopsy/polypectomy/cauterization. I recommended colonoscopy with deep sedation. The patient understands the risks of bleeding and perforation (<1%) and agrees to proceed.     Patient does not need to be seen in office prior to the colonoscopy        Electronically signed by Javi More MD on 3/23/2023 at 9:08 AM

## 2023-03-23 NOTE — LETTER
March 23, 2023       Neelima Muñoz YOB: 1955   [id number] ([facility]) Kev Mcd[place] [address] Date of Visit:  3/23/2023       To Whom It May Concern:    Mayte Macdonald was seen in my clinic on 3/23/2023. He may return to work on April 23 with no restrictions. he underwent surgery on 3/9/23. .    If you have any questions or concerns, please don't hesitate to call.     Sincerely,  Electronically signed by Corey Lamar MD on 3/23/2023 at 9:12 AM

## 2023-03-28 ENCOUNTER — OFFICE VISIT (OUTPATIENT)
Dept: SURGERY | Age: 68
End: 2023-03-28
Payer: MEDICARE

## 2023-03-28 ENCOUNTER — TELEPHONE (OUTPATIENT)
Dept: SURGERY | Age: 68
End: 2023-03-28

## 2023-03-28 VITALS
DIASTOLIC BLOOD PRESSURE: 75 MMHG | BODY MASS INDEX: 26.81 KG/M2 | WEIGHT: 181 LBS | TEMPERATURE: 97.2 F | HEART RATE: 73 BPM | HEIGHT: 69 IN | SYSTOLIC BLOOD PRESSURE: 127 MMHG

## 2023-03-28 DIAGNOSIS — Z09 POSTOPERATIVE EXAMINATION: Primary | ICD-10-CM

## 2023-03-28 DIAGNOSIS — Z46.89 ENCOUNTER FOR REMOVAL OF BILIARY STENT: ICD-10-CM

## 2023-03-28 PROCEDURE — 3074F SYST BP LT 130 MM HG: CPT | Performed by: SURGERY

## 2023-03-28 PROCEDURE — 99212 OFFICE O/P EST SF 10 MIN: CPT | Performed by: SURGERY

## 2023-03-28 PROCEDURE — 1123F ACP DISCUSS/DSCN MKR DOCD: CPT | Performed by: SURGERY

## 2023-03-28 PROCEDURE — 3078F DIAST BP <80 MM HG: CPT | Performed by: SURGERY

## 2023-03-28 NOTE — TELEPHONE ENCOUNTER
Prior Authorization Form:      DEMOGRAPHICS:                     Patient Name:  Vicky Pump  Patient :  1955            Insurance:  Payor: [facility] MEDICARE / Plan: Vladimir Bruce / Product Type: *No Product type* /   Insurance ID Number:    Payer/Plan Subscr  Sex Relation Sub. Ins. ID Effective Group Num   1.  [address] 1955 Male Self [id number]                                    [address] [id number]         DIAGNOSIS & PROCEDURE:                       Procedure/Operation: ERCP with stent removal           CPT Code: 31210    Diagnosis:  Stent in place    ICD10 Code:     Location:  [address] [place] [address]    Surgeon:  Lois Vernon INFORMATION:                          Date: 2023    Time: TBD              Anesthesia:  MAC/TIVA                                                       Status:  Outpatient        Special Comments:         Electronically signed by Kelin Stevenson on 3/28/2023 at 8:30 AM

## 2023-03-28 NOTE — PROGRESS NOTES
Year: Not on file    Number of Places Lived in the Last Year: Not on file    Unstable Housing in the Last Year: No       I have reviewed relevant labs from this admission and interpretation is included in my assessment and plan    Review of Systems    A complete 10 system review was performed and are otherwise negative unless mentioned in the above HPI. Specific negatives are listed below but may not include all those reviewed.     General ROS: negative obtundation, AMS  ENT ROS: negative rhinorrhea, epistaxis  Allergy and Immunology ROS: negative itchy/watery eyes or nasal congestion  Hematological and Lymphatic ROS: negative spontaneous bleeding or bruising  Endocrine ROS: negative  lethargy, mood swings, palpitations or polydipsia/polyuria  Respiratory ROS: negative sputum changes, stridor, tachypnea or wheezing  Cardiovascular ROS: negative for - loss of consciousness, murmur or orthopnea  Gastrointestinal ROS: negative for - hematochezia or hematemesis  Genito-Urinary ROS: negative for -  genital discharge or hematuria  Musculoskeletal ROS: negative for - focal weakness, gangrene  Psych/Neuro ROS: negative for - visual or auditory hallucinations, suicidal ideation    Physical exam:   /75   Pulse 73   Temp 97.2 °F (36.2 °C)   Ht 5' 9\" (1.753 m)   Wt 181 lb (82.1 kg)   BMI 26.73 kg/m²   General appearance:  NAD, appears stated age  Head: NCAT, PERRLA, EOMI, red conjunctiva  Neck: supple, no masses, trachea midline  Lungs: Equal chest rise bilateral, no retractions, no wheezing  Heart: Reg rate  Abdomen: soft, nontender, nondistended  Skin; warm and dry, no cyanosis  Gu: no cva tenderness  Extremities: atraumatic, no focal motor deficits, no open wounds  Psych: No tremor, visual hallucinations      Radiology: N/A  Assessment:  Ninfa Kinsey is a 76 y.o. male with biliary stent in place, history of choledocholithiasis  Patient Active Problem List   Diagnosis    Closed displaced combined

## 2023-03-28 NOTE — TELEPHONE ENCOUNTER
Per the order of Dr. Keyana Villa, patient has been scheduled for ERCP with stent removal on 5.5.2023. Patient provided with procedure information during office visit . Patient instructed to please contact our office with any questions. Procedure scheduled through [employer]. Dr. Pink Sees to enter orders.     Electronically signed by Marisabel Bey on 3/28/23 at 8:30 AM EDT

## 2023-03-31 DIAGNOSIS — K80.62 CALCULUS OF GALLBLADDER AND BILE DUCT WITH ACUTE CHOLECYSTITIS WITHOUT OBSTRUCTION: ICD-10-CM

## 2023-03-31 DIAGNOSIS — I10 HYPERTENSION, UNSPECIFIED TYPE: ICD-10-CM

## 2023-03-31 DIAGNOSIS — R73.9 HYPERGLYCEMIA: ICD-10-CM

## 2023-03-31 LAB
ALBUMIN SERPL-MCNC: 4.2 G/DL (ref 3.5–5.2)
ALP SERPL-CCNC: 157 U/L (ref 40–129)
ALT SERPL-CCNC: 42 U/L (ref 0–40)
ANION GAP SERPL CALCULATED.3IONS-SCNC: 9 MMOL/L (ref 7–16)
AST SERPL-CCNC: 36 U/L (ref 0–39)
BASOPHILS # BLD: 0.06 E9/L (ref 0–0.2)
BASOPHILS NFR BLD: 0.7 % (ref 0–2)
BILIRUB SERPL-MCNC: 0.9 MG/DL (ref 0–1.2)
BUN SERPL-MCNC: 21 MG/DL (ref 6–23)
CALCIUM SERPL-MCNC: 9.4 MG/DL (ref 8.6–10.2)
CHLORIDE SERPL-SCNC: 102 MMOL/L (ref 98–107)
CO2 SERPL-SCNC: 28 MMOL/L (ref 22–29)
CREAT SERPL-MCNC: 1.1 MG/DL (ref 0.7–1.2)
EOSINOPHIL # BLD: 0.31 E9/L (ref 0.05–0.5)
EOSINOPHIL NFR BLD: 3.6 % (ref 0–6)
ERYTHROCYTE [DISTWIDTH] IN BLOOD BY AUTOMATED COUNT: 14.6 FL (ref 11.5–15)
GLUCOSE SERPL-MCNC: 125 MG/DL (ref 74–99)
HCT VFR BLD AUTO: 46.2 % (ref 37–54)
HGB BLD-MCNC: 14.6 G/DL (ref 12.5–16.5)
IMM GRANULOCYTES # BLD: 0.03 E9/L
IMM GRANULOCYTES NFR BLD: 0.3 % (ref 0–5)
LYMPHOCYTES # BLD: 1.74 E9/L (ref 1.5–4)
LYMPHOCYTES NFR BLD: 20 % (ref 20–42)
MCH RBC QN AUTO: 30 PG (ref 26–35)
MCHC RBC AUTO-ENTMCNC: 31.6 % (ref 32–34.5)
MCV RBC AUTO: 94.9 FL (ref 80–99.9)
MONOCYTES # BLD: 0.67 E9/L (ref 0.1–0.95)
MONOCYTES NFR BLD: 7.7 % (ref 2–12)
NEUTROPHILS # BLD: 5.88 E9/L (ref 1.8–7.3)
NEUTS SEG NFR BLD: 67.7 % (ref 43–80)
PLATELET # BLD AUTO: 254 E9/L (ref 130–450)
PMV BLD AUTO: 10.3 FL (ref 7–12)
POTASSIUM SERPL-SCNC: 4.1 MMOL/L (ref 3.5–5)
PROT SERPL-MCNC: 7 G/DL (ref 6.4–8.3)
RBC # BLD AUTO: 4.87 E12/L (ref 3.8–5.8)
SODIUM SERPL-SCNC: 139 MMOL/L (ref 132–146)
WBC # BLD: 8.7 E9/L (ref 4.5–11.5)

## 2023-04-06 ENCOUNTER — OFFICE VISIT (OUTPATIENT)
Dept: FAMILY MEDICINE CLINIC | Age: 68
End: 2023-04-06

## 2023-04-06 VITALS
TEMPERATURE: 97 F | WEIGHT: 185 LBS | OXYGEN SATURATION: 97 % | HEART RATE: 57 BPM | SYSTOLIC BLOOD PRESSURE: 122 MMHG | DIASTOLIC BLOOD PRESSURE: 68 MMHG | BODY MASS INDEX: 27.32 KG/M2

## 2023-04-06 DIAGNOSIS — I25.110 CORONARY ARTERY DISEASE INVOLVING NATIVE CORONARY ARTERY OF NATIVE HEART WITH UNSTABLE ANGINA PECTORIS (HCC): ICD-10-CM

## 2023-04-06 DIAGNOSIS — E11.9 DIABETES MELLITUS WITHOUT COMPLICATION (HCC): ICD-10-CM

## 2023-04-06 DIAGNOSIS — E78.2 MIXED HYPERLIPIDEMIA: ICD-10-CM

## 2023-04-06 DIAGNOSIS — R73.9 HYPERGLYCEMIA: Primary | ICD-10-CM

## 2023-04-06 DIAGNOSIS — I10 HYPERTENSION, UNSPECIFIED TYPE: ICD-10-CM

## 2023-04-06 LAB — HBA1C MFR BLD: 6.5 %

## 2023-04-06 ASSESSMENT — ENCOUNTER SYMPTOMS
ABDOMINAL PAIN: 1
EYES NEGATIVE: 1
ALLERGIC/IMMUNOLOGIC NEGATIVE: 1
RESPIRATORY NEGATIVE: 1

## 2023-04-06 NOTE — PATIENT INSTRUCTIONS
Continue present treatment  Follow-up with the surgeon  Low-sodium low-fat low-carb diet  Lab work before the next visit  Return to clinic earlier if any problems

## 2023-04-06 NOTE — PROGRESS NOTES
Cervical back: Normal range of motion and neck supple. Skin:     General: Skin is warm and dry. Neurological:      Mental Status: He is alert and oriented to person, place, and time. Psychiatric:         Behavior: Behavior normal.          Labs :    Lab Results   Component Value Date    WBC 8.7 03/31/2023    HGB 14.6 03/31/2023    HCT 46.2 03/31/2023     03/31/2023    CHOL 91 12/06/2022    TRIG 119 12/06/2022    HDL 29 12/06/2022    ALT 42 (H) 03/31/2023    AST 36 03/31/2023     03/31/2023    K 4.1 03/31/2023     03/31/2023    CREATININE 1.1 03/31/2023    BUN 21 03/31/2023    CO2 28 03/31/2023    PSA 1.39 11/16/2016    INR 1.0 02/15/2018    LABA1C 6.5 04/06/2023    LABMICR <12.0 02/15/2020     No results found for: VOL, APPEARANCE, COLORU, LABSPEC, LABPH, LEUKBLD, NITRU, GLUCOSEU, KETUA, UROBILINOGEN, KETUA, UROBILINOGEN, BILIRUBINUR, OCBU  Lab Results   Component Value Date/Time    PSA 1.39 11/16/2016 12:00 PM         Controlled Substances Monitoring:                                    ASSESSMENT     Patient Active Problem List    Diagnosis Date Noted    Choledocholithiasis 03/09/2023    Encounter for removal of biliary stent 03/09/2023    Preoperative clearance 03/09/2023    Lung nodule 10/07/2022    Diabetes mellitus without complication ([address] 50/86/4084    H/O partial nephrectomy 06/10/2021    Renal cell carcinoma of right kidney ([address] 06/03/2019    Stage 3b chronic kidney disease ([address] 12/23/2018    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  Hyperglycemia  -     POCT

## 2023-04-08 PROBLEM — Z01.818 PREOPERATIVE CLEARANCE: Status: RESOLVED | Noted: 2023-03-09 | Resolved: 2023-04-08

## 2023-04-11 DIAGNOSIS — Z12.11 SCREENING FOR COLON CANCER: ICD-10-CM

## 2023-04-11 LAB
CONTROL: NORMAL
HEMOCCULT STL QL: POSITIVE

## 2023-04-11 PROCEDURE — 82274 ASSAY TEST FOR BLOOD FECAL: CPT | Performed by: FAMILY MEDICINE

## 2023-04-27 PROBLEM — Z09 POSTOPERATIVE EXAMINATION: Status: RESOLVED | Noted: 2023-03-28 | Resolved: 2023-04-27

## 2023-05-01 RX ORDER — SODIUM CHLORIDE 9 MG/ML
INJECTION, SOLUTION INTRAVENOUS CONTINUOUS
Status: CANCELLED | OUTPATIENT
Start: 2023-05-01

## 2023-05-04 ENCOUNTER — HOSPITAL ENCOUNTER (OUTPATIENT)
Dept: PREADMISSION TESTING | Age: 68
Discharge: HOME | End: 2023-05-04
Payer: MEDICARE

## 2023-05-04 VITALS
TEMPERATURE: 96.6 F | HEIGHT: 69 IN | DIASTOLIC BLOOD PRESSURE: 79 MMHG | HEART RATE: 56 BPM | WEIGHT: 183.3 LBS | SYSTOLIC BLOOD PRESSURE: 130 MMHG | RESPIRATION RATE: 16 BRPM | OXYGEN SATURATION: 98 % | BODY MASS INDEX: 27.15 KG/M2

## 2023-05-04 DIAGNOSIS — Z01.818 PREOP TESTING: Primary | ICD-10-CM

## 2023-05-04 LAB
ALBUMIN SERPL-MCNC: 4.5 G/DL (ref 3.5–5.2)
ALP SERPL-CCNC: 103 U/L (ref 40–129)
ALT SERPL-CCNC: 32 U/L (ref 0–40)
ANION GAP SERPL CALCULATED.3IONS-SCNC: 10 MMOL/L (ref 7–16)
AST SERPL-CCNC: 26 U/L (ref 0–39)
BILIRUB SERPL-MCNC: 1.4 MG/DL (ref 0–1.2)
BUN SERPL-MCNC: 18 MG/DL (ref 6–23)
CALCIUM SERPL-MCNC: 9.4 MG/DL (ref 8.6–10.2)
CHLORIDE SERPL-SCNC: 105 MMOL/L (ref 98–107)
CO2 SERPL-SCNC: 28 MMOL/L (ref 22–29)
CREAT SERPL-MCNC: 1.1 MG/DL (ref 0.7–1.2)
ERYTHROCYTE [DISTWIDTH] IN BLOOD BY AUTOMATED COUNT: 14.3 FL (ref 11.5–15)
GLUCOSE SERPL-MCNC: 129 MG/DL (ref 74–99)
HCT VFR BLD AUTO: 45.9 % (ref 37–54)
HGB BLD-MCNC: 15 G/DL (ref 12.5–16.5)
MCH RBC QN AUTO: 31 PG (ref 26–35)
MCHC RBC AUTO-ENTMCNC: 32.7 % (ref 32–34.5)
MCV RBC AUTO: 94.8 FL (ref 80–99.9)
PLATELET # BLD AUTO: 186 E9/L (ref 130–450)
PMV BLD AUTO: 9.7 FL (ref 7–12)
POTASSIUM SERPL-SCNC: 4.8 MMOL/L (ref 3.5–5)
PROT SERPL-MCNC: 7.2 G/DL (ref 6.4–8.3)
RBC # BLD AUTO: 4.84 E12/L (ref 3.8–5.8)
SODIUM SERPL-SCNC: 143 MMOL/L (ref 132–146)
WBC # BLD: 8.4 E9/L (ref 4.5–11.5)

## 2023-05-04 PROCEDURE — 80053 COMPREHEN METABOLIC PANEL: CPT

## 2023-05-04 PROCEDURE — 36415 COLL VENOUS BLD VENIPUNCTURE: CPT

## 2023-05-04 PROCEDURE — 85027 COMPLETE CBC AUTOMATED: CPT

## 2023-05-04 NOTE — PROGRESS NOTES
[address] [facility]                                                                                                                    PRE OP INSTRUCTIONS FOR  James Barrett        Date: 5/4/2023    Date of surgery: 5/5/23   Arrival Time: Hospital will call you between 5pm and 7pm tonight with your final arrival time for surgery    Nothing by mouth (NPO) as instructed. _Nothing to eat or drink after midnight___________________________________________________________________    Take the following medications with a small sip of water on the morning of Surgery: metoprolol     Diabetics may take evening dose of insulin but none after midnight. If you feel symptomatic or low blood sugar morning of surgery drink 1-2 ounces of apple juice only. Aspirin, Ibuprofen, Advil, Naproxen, Vitamin E and other Anti-inflammatory products should be stopped  before surgery  as directed by your physician. Take Tylenol only unless instructed otherwise by your surgeon. Check with your Doctor regarding stopping Plavix, Coumadin, Lovenox, Eliquis, Effient, or other blood thinners. Do not smoke,use illicit drugs and do not drink any alcoholic beverages 24 hours prior to surgery. You may brush your teeth the morning of surgery. DO NOT SWALLOW WATER    You MUST make arrangements for a responsible adult to take you home after your surgery. You will not be allowed to leave alone or drive yourself home. It is strongly suggested someone stay with you the first 24 hrs. Your surgery will be cancelled if you do not have a ride home. PEDIATRIC PATIENTS ONLY:  A parent/legal guardian must accompany a child scheduled for surgery and plan to stay at the hospital until the child is discharged. Please do not bring other children with you.     Please wear simple, loose fitting clothing to the hospital.  Cleophus Silver not bring valuables (money, credit cards, checkbooks, etc.) Do not wear any makeup (including no eye makeup) or

## 2023-05-05 ENCOUNTER — HOSPITAL ENCOUNTER (OUTPATIENT)
Dept: GENERAL RADIOLOGY | Age: 68
End: 2023-05-05
Payer: MEDICARE

## 2023-05-05 ENCOUNTER — ANESTHESIA EVENT (OUTPATIENT)
Dept: OPERATING ROOM | Age: 68
End: 2023-05-05
Payer: MEDICARE

## 2023-05-05 ENCOUNTER — ANESTHESIA (OUTPATIENT)
Dept: OPERATING ROOM | Age: 68
End: 2023-05-05
Payer: MEDICARE

## 2023-05-05 ENCOUNTER — HOSPITAL ENCOUNTER (OUTPATIENT)
Age: 68
Setting detail: OUTPATIENT SURGERY
Discharge: HOME | End: 2023-05-05
Attending: SURGERY | Admitting: SURGERY
Payer: MEDICARE

## 2023-05-05 VITALS
RESPIRATION RATE: 20 BRPM | HEIGHT: 69 IN | HEART RATE: 70 BPM | DIASTOLIC BLOOD PRESSURE: 73 MMHG | BODY MASS INDEX: 27.15 KG/M2 | WEIGHT: 183.3 LBS | OXYGEN SATURATION: 96 % | SYSTOLIC BLOOD PRESSURE: 116 MMHG | TEMPERATURE: 97.4 F

## 2023-05-05 DIAGNOSIS — Z46.89 ENCOUNTER FOR REMOVAL OF BILIARY STENT: ICD-10-CM

## 2023-05-05 PROCEDURE — 2580000003 HC RX 258: Performed by: NURSE ANESTHETIST, CERTIFIED REGISTERED

## 2023-05-05 PROCEDURE — 2580000003 HC RX 258: Performed by: ANESTHESIOLOGY

## 2023-05-05 PROCEDURE — 82962 GLUCOSE BLOOD TEST: CPT

## 2023-05-05 PROCEDURE — 3700000001 HC ADD 15 MINUTES (ANESTHESIA): Performed by: SURGERY

## 2023-05-05 PROCEDURE — 3600007503: Performed by: SURGERY

## 2023-05-05 PROCEDURE — 7100000010 HC PHASE II RECOVERY - FIRST 15 MIN: Performed by: SURGERY

## 2023-05-05 PROCEDURE — 74330 X-RAY BILE/PANC ENDOSCOPY: CPT

## 2023-05-05 PROCEDURE — 2709999900 HC NON-CHARGEABLE SUPPLY: Performed by: SURGERY

## 2023-05-05 PROCEDURE — 7100000011 HC PHASE II RECOVERY - ADDTL 15 MIN: Performed by: SURGERY

## 2023-05-05 PROCEDURE — 43264 ERCP REMOVE DUCT CALCULI: CPT | Performed by: SURGERY

## 2023-05-05 PROCEDURE — 3700000000 HC ANESTHESIA ATTENDED CARE: Performed by: SURGERY

## 2023-05-05 PROCEDURE — 6360000002 HC RX W HCPCS: Performed by: SURGERY

## 2023-05-05 PROCEDURE — 6360000002 HC RX W HCPCS: Performed by: NURSE ANESTHETIST, CERTIFIED REGISTERED

## 2023-05-05 PROCEDURE — 2720000010 HC SURG SUPPLY STERILE: Performed by: SURGERY

## 2023-05-05 PROCEDURE — 6360000004 HC RX CONTRAST MEDICATION: Performed by: SURGERY

## 2023-05-05 PROCEDURE — 43275 ERCP REMOVE FORGN BODY DUCT: CPT | Performed by: SURGERY

## 2023-05-05 PROCEDURE — C1769 GUIDE WIRE: HCPCS | Performed by: SURGERY

## 2023-05-05 PROCEDURE — 3600007513: Performed by: SURGERY

## 2023-05-05 RX ORDER — PROPOFOL 10 MG/ML
INJECTION, EMULSION INTRAVENOUS PRN
Status: DISCONTINUED | OUTPATIENT
Start: 2023-05-05 | End: 2023-05-05 | Stop reason: SDUPTHER

## 2023-05-05 RX ORDER — SODIUM CHLORIDE 9 MG/ML
25 INJECTION, SOLUTION INTRAVENOUS PRN
Status: DISCONTINUED | OUTPATIENT
Start: 2023-05-05 | End: 2023-05-05 | Stop reason: HOSPADM

## 2023-05-05 RX ORDER — MIDAZOLAM HYDROCHLORIDE 1 MG/ML
INJECTION INTRAMUSCULAR; INTRAVENOUS PRN
Status: DISCONTINUED | OUTPATIENT
Start: 2023-05-05 | End: 2023-05-05 | Stop reason: SDUPTHER

## 2023-05-05 RX ORDER — SODIUM CHLORIDE 9 MG/ML
INJECTION, SOLUTION INTRAVENOUS CONTINUOUS
Status: DISCONTINUED | OUTPATIENT
Start: 2023-05-05 | End: 2023-05-05 | Stop reason: HOSPADM

## 2023-05-05 RX ORDER — FENTANYL CITRATE 50 UG/ML
INJECTION, SOLUTION INTRAMUSCULAR; INTRAVENOUS PRN
Status: DISCONTINUED | OUTPATIENT
Start: 2023-05-05 | End: 2023-05-05 | Stop reason: SDUPTHER

## 2023-05-05 RX ORDER — CIPROFLOXACIN 2 MG/ML
400 INJECTION, SOLUTION INTRAVENOUS ONCE
Status: COMPLETED | OUTPATIENT
Start: 2023-05-05 | End: 2023-05-05

## 2023-05-05 RX ORDER — SODIUM CHLORIDE 0.9 % (FLUSH) 0.9 %
5-40 SYRINGE (ML) INJECTION EVERY 12 HOURS SCHEDULED
Status: DISCONTINUED | OUTPATIENT
Start: 2023-05-05 | End: 2023-05-05 | Stop reason: HOSPADM

## 2023-05-05 RX ORDER — SODIUM CHLORIDE 9 MG/ML
INJECTION, SOLUTION INTRAVENOUS CONTINUOUS PRN
Status: DISCONTINUED | OUTPATIENT
Start: 2023-05-05 | End: 2023-05-05 | Stop reason: SDUPTHER

## 2023-05-05 RX ORDER — SODIUM CHLORIDE 0.9 % (FLUSH) 0.9 %
5-40 SYRINGE (ML) INJECTION PRN
Status: DISCONTINUED | OUTPATIENT
Start: 2023-05-05 | End: 2023-05-05 | Stop reason: HOSPADM

## 2023-05-05 RX ADMIN — FENTANYL CITRATE 50 MCG: 50 INJECTION, SOLUTION INTRAMUSCULAR; INTRAVENOUS at 12:09

## 2023-05-05 RX ADMIN — SODIUM CHLORIDE: 9 INJECTION, SOLUTION INTRAVENOUS at 10:42

## 2023-05-05 RX ADMIN — SODIUM CHLORIDE: 900 INJECTION, SOLUTION INTRAVENOUS at 12:06

## 2023-05-05 RX ADMIN — PROPOFOL 170 MG: 10 INJECTION, EMULSION INTRAVENOUS at 12:09

## 2023-05-05 RX ADMIN — FENTANYL CITRATE 50 MCG: 50 INJECTION, SOLUTION INTRAMUSCULAR; INTRAVENOUS at 12:12

## 2023-05-05 RX ADMIN — Medication 400 MG: at 12:09

## 2023-05-05 RX ADMIN — MIDAZOLAM 2 MG: 1 INJECTION INTRAMUSCULAR; INTRAVENOUS at 12:06

## 2023-05-05 ASSESSMENT — PAIN - FUNCTIONAL ASSESSMENT: PAIN_FUNCTIONAL_ASSESSMENT: 0-10

## 2023-05-05 NOTE — ANESTHESIA POSTPROCEDURE EVALUATION
Department of Anesthesiology  Postprocedure Note    Patient: Vy Whitlock  MRN: [id number]  YOB: 1955  Date of evaluation: 5/5/2023      Procedure Summary     Date: 05/05/23 Room / Location: [address] [place] [address] 644  [address] [facility]    Anesthesia Start: 1206 Anesthesia Stop: 1233    Procedures:       ERCP STENT REMOVAL      ERCP STONE REMOVAL Diagnosis:       Encounter for removal of biliary stent      (Encounter for removal of biliary stent [Z46.89])    Surgeons: Alexandria Montano MD Responsible Provider: Tatiana Godinez MD    Anesthesia Type: MAC ASA Status: 3          Anesthesia Type: No value filed.     Irene Phase I: Irene Score: 10    Irene Phase II: Irene Score: 10      Anesthesia Post Evaluation    Patient location during evaluation: PACU  Patient participation: complete - patient participated  Level of consciousness: awake  Pain score: 2  Airway patency: patent  Nausea & Vomiting: no nausea and no vomiting  Cardiovascular status: hemodynamically stable  Respiratory status: acceptable  Hydration status: euvolemic

## 2023-05-05 NOTE — DISCHARGE INSTRUCTIONS
[facility]  __________________________________________________    Discharge Instructions  [address]    Patient Name:  Paulo Jaquez      Date: 5/5/2023  Time:  12:59 PM      _x__ Rest today; all patients that received a general anesthetic should not drive for 24 hours. ___ Keep dressing clean and dry. Suture removal will be arranged by your physician. _x__ Drink extra fluids (non-alcoholic) for the next 24 hours. _x__ Refer to your physician's special handout. See below. ___Make an appointment to see your physician on     _x__ Call office tomorrow to make follow-up arrangements. _x__ You need to have a responsible adult stay with you for the next 24 hours after surgery. ___ Medications as follows:    ___ Refer to the medication handout.    ___ Ham Huddle / Shower Instructions       Endoscopic Retrograde Cholangiopancreatogram (ERCP): What to Expect at [address] [place] [address]  After you have an endoscopic retrograde cholangiopancreatogram (ERCP), you probably will stay at the hospital or clinic for 1 to 2 hours. This will allow the medicine to wear off. You will be able to go home after your doctor or a nurse checks to make sure you are not having any problems. If you stay in the hospital overnight, you may go home the next day. You may have a sore throat for a day or two after the procedure. This care sheet gives you a general idea about how long it will take for you to recover. But each person recovers at a different pace. Follow the steps below to get better as quickly as possible. How can you care for yourself at home? Activity    Rest as much as you need to after you go home. You should be able to go back to your usual activities the day after the procedure. Diet    Follow your doctor's directions for eating after the procedure. Drink plenty of fluids (unless your doctor tells you not to).    Medicines    Your doctor will tell you if and when you can

## 2023-05-05 NOTE — ANESTHESIA PRE PROCEDURE
mL/hr at 05/05/23 1042 New Bag at 05/05/23 1042       Allergies: Allergies   Allergen Reactions    Bee Venom Shortness Of Breath    Penicillins Shortness Of Breath and Rash    Adhesive Tape Other (See Comments)     Skin blisters.        Problem List:    Patient Active Problem List   Diagnosis Code    Closed displaced combined transverse-posterior fracture of left acetabulum ([facility] F48.551M    Primary hypertension I10    Coronary artery disease involving native coronary artery of native heart with unstable angina pectoris ([facility] I25.110    Renal mass N28.89    Mixed hyperlipidemia E78.2    Hyperglycemia R73.9    Diabetes mellitus without complication ([facility] C40.5    H/O partial nephrectomy Z90.5    Lung nodule R91.1    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 D41.01    Renal cell carcinoma of right kidney ([facility] C64.1    Stage 3b chronic kidney disease ([facility] N18.32    Choledocholithiasis K80.50    Encounter for removal of biliary stent Z46.89       Past Medical History:        Diagnosis Date    CAD (coronary artery disease)     Hyperglycemia     Hyperlipidemia     Hypertension     Prolonged emergence from general anesthesia     Renal mass     Supraventricular tachycardia ([facility]        Past Surgical History:        Procedure Laterality Date    CARDIAC SURGERY  10/2022    CABGGx4 at [facility]    CHOLECYSTECTOMY, LAPAROSCOPIC N/A 03/11/2023    CHOLECYSTECTOMY LAPAROSCOPIC performed by Sabra Garcia MD at [address] [place] [address]      ERCP N/A 03/10/2023    ERCP ENDOSCOPIC RETROGRADE CHOLANGIOPANCREATOGRAPHY performed by Louisa Cornell MD at [address] [facility] ERCP N/A 03/10/2023    ERCP SPHINCTER/PAPILLOTOMY performed by Louisa Cornell MD at [address] [facility] ERCP N/A 03/10/2023    ERCP STONE REMOVAL performed by Louisa Cornell MD at [address]  ERCP N/A 03/10/2023    ERCP STENT INSERTION performed by Louisa Cornell MD at [address] [place] [address]

## 2023-05-05 NOTE — H&P
General Surgery History and Physical  [facility]    Patient's Name/Date of Birth: Russell De Luna / 1955    Date: May 5, 2023     Surgeon: Rubi Cortes MD    PCP: Jeffrey Byrd MD     Chief Complaint: Biliary stent in place    HPI:   Russell De Luna is a 76 y.o. male who presents for evaluation of biliary stent removal.  He is status post laparoscopic cholecystectomy for gallstone pancreatitis. This was preceded by ERCP with stone extraction and stent placement for choledocholithiasis. He has done well since surgery and has no symptoms. He would like to have his biliary stent removed.     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    Renal cell carcinoma of right kidney (HCC)    Stage 3b chronic kidney disease ([address]    Choledocholithiasis    Encounter for removal of biliary stent       Past Medical History:   Diagnosis Date    CAD (coronary artery disease)     Hyperglycemia     Hyperlipidemia     Hypertension     Prolonged emergence from general anesthesia     Renal mass     Supraventricular tachycardia [facility])        Past Surgical History:   Procedure Laterality Date    CARDIAC SURGERY  10/2022    CABGGx4 at [facility]    CHOLECYSTECTOMY, LAPAROSCOPIC N/A 03/11/2023    CHOLECYSTECTOMY LAPAROSCOPIC performed by Leslye Flores MD at [facility]      ERCP N/A 03/10/2023    ERCP ENDOSCOPIC RETROGRADE CHOLANGIOPANCREATOGRAPHY performed by Rubi Cortes MD at [address] [place] [address]    ERCP N/A 03/10/2023    ERCP SPHINCTER/PAPILLOTOMY performed by Rubi Cortes MD at [address] [place] [address]

## 2023-05-06 NOTE — OP NOTE
Operative Note      Patient: Wilber Payne  YOB: 1955  MRN: [id number]    Date of Procedure: 5/5/2023    Pre-Op Diagnosis Codes:     * Encounter for removal of biliary stent [Z46.89]    Post-Op Diagnosis: Same       Procedure(s):  ERCP STENT REMOVAL  ERCP STONE REMOVAL    Surgeon(s):  Stella Lloyd MD    Assistant:   Surgical Assistant: Regino Goldstein RN  First Assistant: Jv Max    Anesthesia: Monitor Anesthesia Care    Estimated Blood Loss (mL): less than 50     Complications: None    Specimens:   * No specimens in log *    Implants:  * No implants in log *      Drains: * No LDAs found *    Findings: see below      Detailed Description of Procedure:       INDICATION: This is a 76 y.o. male with history of choledocholithiasis status post prior ERCP with stone extraction and stent placement. He has done well, and now presents for stent removal. The patient agreed to undergo the ERCP. The risks, benefits, and alternatives were explained to the patient for the procedure including bleeding, infection, perforation, and pancreatitis. The patient understood and agreed to proceed. DESCRIPTION OF PROCEDURE: The patient was brought to the operating room and he underwent [facility] anesthesia by the anesthesia team. He was then placed in the left lateral decubitus position. The flexible duodenoscope was inserted down  the oropharynx and passed down the esophagus into the stomach and into the  duodenum. The papilla was identified. A plastic stent was grasped from the ampulla and removed without difficulty. The duodenoscope was reintroduced down to the level of the ampulla. A sphincterotome with a Claryce Codding was used to cannulate the common bile duct. Contrast injection revealed adequate cannulation. A 9 to 12 mm balloon was then used to sweep the common bile duct. 2 medium size stones were removed.   Push cholangiogram was then obtained and did not reveal any further filling defects or

## 2023-05-08 LAB — METER GLUCOSE: 131 MG/DL (ref 74–99)

## 2023-06-20 ENCOUNTER — ANESTHESIA (OUTPATIENT)
Dept: ENDOSCOPY | Age: 68
End: 2023-06-20
Payer: MEDICARE

## 2023-06-20 ENCOUNTER — ANESTHESIA EVENT (OUTPATIENT)
Dept: ENDOSCOPY | Age: 68
End: 2023-06-20
Payer: MEDICARE

## 2023-06-20 ENCOUNTER — HOSPITAL ENCOUNTER (OUTPATIENT)
Age: 68
Setting detail: OUTPATIENT SURGERY
Discharge: HOME | End: 2023-06-20
Attending: SURGERY | Admitting: SURGERY
Payer: MEDICARE

## 2023-06-20 VITALS
DIASTOLIC BLOOD PRESSURE: 84 MMHG | HEIGHT: 69 IN | TEMPERATURE: 97 F | OXYGEN SATURATION: 95 % | RESPIRATION RATE: 18 BRPM | BODY MASS INDEX: 26.66 KG/M2 | SYSTOLIC BLOOD PRESSURE: 125 MMHG | WEIGHT: 180 LBS | HEART RATE: 62 BPM

## 2023-06-20 DIAGNOSIS — Z12.11 SCREEN FOR COLON CANCER: ICD-10-CM

## 2023-06-20 DIAGNOSIS — Z01.812 PRE-OPERATIVE LABORATORY EXAMINATION: Primary | ICD-10-CM

## 2023-06-20 PROBLEM — Z46.89 ENCOUNTER FOR REMOVAL OF BILIARY STENT: Status: RESOLVED | Noted: 2023-03-09 | Resolved: 2023-06-20

## 2023-06-20 LAB — METER GLUCOSE: 119 MG/DL (ref 74–99)

## 2023-06-20 PROCEDURE — 3609012400 HC EGD TRANSORAL BIOPSY SINGLE/MULTIPLE: Performed by: SURGERY

## 2023-06-20 PROCEDURE — 7100000010 HC PHASE II RECOVERY - FIRST 15 MIN: Performed by: SURGERY

## 2023-06-20 PROCEDURE — 88305 TISSUE EXAM BY PATHOLOGIST: CPT

## 2023-06-20 PROCEDURE — 6360000002 HC RX W HCPCS: Performed by: NURSE ANESTHETIST, CERTIFIED REGISTERED

## 2023-06-20 PROCEDURE — 82962 GLUCOSE BLOOD TEST: CPT

## 2023-06-20 PROCEDURE — 3700000000 HC ANESTHESIA ATTENDED CARE: Performed by: SURGERY

## 2023-06-20 PROCEDURE — 7100000011 HC PHASE II RECOVERY - ADDTL 15 MIN: Performed by: SURGERY

## 2023-06-20 PROCEDURE — 3700000001 HC ADD 15 MINUTES (ANESTHESIA): Performed by: SURGERY

## 2023-06-20 PROCEDURE — 3609027000 HC COLONOSCOPY: Performed by: SURGERY

## 2023-06-20 PROCEDURE — 2580000003 HC RX 258: Performed by: NURSE ANESTHETIST, CERTIFIED REGISTERED

## 2023-06-20 PROCEDURE — 2580000003 HC RX 258: Performed by: SURGERY

## 2023-06-20 PROCEDURE — 2709999900 HC NON-CHARGEABLE SUPPLY: Performed by: SURGERY

## 2023-06-20 RX ORDER — SODIUM CHLORIDE 9 MG/ML
INJECTION, SOLUTION INTRAVENOUS CONTINUOUS
Status: DISCONTINUED | OUTPATIENT
Start: 2023-06-20 | End: 2023-06-20 | Stop reason: HOSPADM

## 2023-06-20 RX ORDER — SODIUM CHLORIDE 0.9 % (FLUSH) 0.9 %
5-40 SYRINGE (ML) INJECTION EVERY 12 HOURS SCHEDULED
Status: DISCONTINUED | OUTPATIENT
Start: 2023-06-20 | End: 2023-06-20 | Stop reason: HOSPADM

## 2023-06-20 RX ORDER — PANTOPRAZOLE SODIUM 20 MG/1
20 TABLET, DELAYED RELEASE ORAL
Qty: 90 TABLET | Refills: 3 | Status: SHIPPED | OUTPATIENT
Start: 2023-06-20

## 2023-06-20 RX ORDER — SODIUM CHLORIDE 9 MG/ML
INJECTION, SOLUTION INTRAVENOUS PRN
Status: DISCONTINUED | OUTPATIENT
Start: 2023-06-20 | End: 2023-06-20 | Stop reason: HOSPADM

## 2023-06-20 RX ORDER — PROPOFOL 10 MG/ML
INJECTION, EMULSION INTRAVENOUS PRN
Status: DISCONTINUED | OUTPATIENT
Start: 2023-06-20 | End: 2023-06-20 | Stop reason: SDUPTHER

## 2023-06-20 RX ORDER — SODIUM CHLORIDE 9 MG/ML
INJECTION, SOLUTION INTRAVENOUS CONTINUOUS PRN
Status: DISCONTINUED | OUTPATIENT
Start: 2023-06-20 | End: 2023-06-20 | Stop reason: SDUPTHER

## 2023-06-20 RX ORDER — SODIUM CHLORIDE 0.9 % (FLUSH) 0.9 %
5-40 SYRINGE (ML) INJECTION PRN
Status: DISCONTINUED | OUTPATIENT
Start: 2023-06-20 | End: 2023-06-20 | Stop reason: HOSPADM

## 2023-06-20 RX ADMIN — PROPOFOL 300 MG: 10 INJECTION, EMULSION INTRAVENOUS at 12:15

## 2023-06-20 RX ADMIN — SODIUM CHLORIDE: 9 INJECTION, SOLUTION INTRAVENOUS at 10:03

## 2023-06-20 RX ADMIN — SODIUM CHLORIDE: 9 INJECTION, SOLUTION INTRAVENOUS at 12:08

## 2023-06-20 ASSESSMENT — LIFESTYLE VARIABLES: SMOKING_STATUS: 0

## 2023-06-20 ASSESSMENT — PAIN SCALES - GENERAL
PAINLEVEL_OUTOF10: 0

## 2023-06-20 ASSESSMENT — PAIN - FUNCTIONAL ASSESSMENT: PAIN_FUNCTIONAL_ASSESSMENT: NONE - DENIES PAIN

## 2023-06-20 NOTE — ANESTHESIA POSTPROCEDURE EVALUATION
Department of Anesthesiology  Postprocedure Note    Patient: Raul Cuevas  MRN: [id number]  [place]: 1955  Date of evaluation: 6/20/2023      Procedure Summary     Date: 06/20/23 Room / Location: [address] [place] [address] / [facility]    Anesthesia Start: 1208 Anesthesia Stop:     Procedures:       EGD ESOPHAGOGASTRODUODENOSCOPY      LOW SCREENING TOTAL COLONOSCOPY, POSSIBLE BIOPSY Diagnosis:       Screen for colon cancer      (Screen for colon cancer [Z12.11])    Surgeons: Raj Ibrahim MD Responsible Provider: Yarely Alfonso MD    Anesthesia Type: MAC ASA Status: 3          Anesthesia Type: No value filed.     Irene Phase I: Irene Score: 10    Irene Phase II: Irene Score: 10      Anesthesia Post Evaluation    Patient location during evaluation: PACU  Patient participation: complete - patient participated  Level of consciousness: awake  Pain score: 3  Airway patency: patent  Nausea & Vomiting: no nausea and no vomiting  Complications: no  Cardiovascular status: blood pressure returned to baseline  Respiratory status: acceptable  Hydration status: euvolemic

## 2023-06-20 NOTE — ANESTHESIA PRE PROCEDURE
Department of Anesthesiology  Preprocedure Note       Name:  Ricardo Christianson   Age:  76 y.o.  :  1955                                          MRN:  [id number]         Date:  2023      Surgeon: Donell Alcantara):  Nora Bowman MD    Procedure: Procedure(s):  EGD ESOPHAGOGASTRODUODENOSCOPY  LOW SCREENING TOTAL COLONOSCOPY, POSSIBLE BIOPSY    Medications prior to admission:   Prior to Admission medications    Medication Sig Start Date End Date Taking? Authorizing Provider   glipiZIDE (GLUCOTROL XL) 2.5 MG extended release tablet TAKE ONE TABLET BY MOUTH DAILY 23   Rena Milton MD   turmeric 500 MG CAPS Take 2 capsules by mouth daily    Historical Provider, MD   Multiple Vitamins-Minerals (THERAPEUTIC MULTIVITAMIN-MINERALS) tablet Take 1 tablet by mouth daily    Historical Provider, MD   metoprolol succinate (TOPROL XL) 100 MG extended release tablet Take 1 tablet by mouth daily 22   Historical Provider, MD   atorvastatin (LIPITOR) 80 MG tablet Take 1 tablet by mouth daily 22   Historical Provider, MD   clopidogrel (PLAVIX) 75 MG tablet Take 1 tablet by mouth daily    Historical Provider, MD   aspirin 81 MG tablet Take 1 tablet by mouth daily    Historical Provider, MD       Current medications:    Current Outpatient Medications   Medication Sig Dispense Refill    glipiZIDE (GLUCOTROL XL) 2.5 MG extended release tablet TAKE ONE TABLET BY MOUTH DAILY 30 tablet 2    turmeric 500 MG CAPS Take 2 capsules by mouth daily      Multiple Vitamins-Minerals (THERAPEUTIC MULTIVITAMIN-MINERALS) tablet Take 1 tablet by mouth daily      metoprolol succinate (TOPROL XL) 100 MG extended release tablet Take 1 tablet by mouth daily      atorvastatin (LIPITOR) 80 MG tablet Take 1 tablet by mouth daily      clopidogrel (PLAVIX) 75 MG tablet Take 1 tablet by mouth daily      aspirin 81 MG tablet Take 1 tablet by mouth daily       No current facility-administered medications for this visit.

## 2023-06-20 NOTE — DISCHARGE INSTRUCTIONS
Salvatore Calzada  [address] [facility] [address]  Phone: [phone]  Fax:  [phone]      POST-PROCEDURE INSTRUCTIONS FOR EGD AND COLONOSCOPY    [address]  6/20/2023      1. You underwent an EGD (upper endoscopy) today. I found  healing ulcers and a hiatal hernia. I took biopsies    2. You underwent a colonoscopy today    I found Moderate colonic diverticulosis involving  the sigmoid  Moderate internal hemorrhoids    You may experience: Increased amount of gas. Slight abdominal bloating and/or cramping. Activity: no driving today. Ok to resume activity as tolerated starting tomorrow. Diet:  You may resume your normal diet. Plan: I found healing ulcers and started you on pantoprazole 20 mg daily            Await pathology. For colon cancer screening in this average-risk patient, colonoscopy may be repeated in 10 years  You have diverticulosis and hemorrhoids    Follow-up: Please call [phone] with any questions or concerns and to schedule a follow up visit as needed with Dr. Vincent Calzada.       At the following locations:  --[facility] [address] [place] [address]    --[address] [facility], [address] [place] [address]

## 2023-06-20 NOTE — OP NOTE
[address] [place] [address]  UPPER ENDOSCOPY REPORT    DATE OF PROCEDURE: 6/20/23     SURGEON: Tierra Iniguez M.D.    ASSISTANT: none    PREOPERATIVE DIAGNOSIS: epigastric pain    POSTOPERATIVE DIAGNOSIS: healing antral ulcers, no active bleeding, small hiatal hernia    OPERATION: Esophagogastroduodenoscopy with biopsies    ANESTHESIA: Local monitored anesthesia. (refer to Anesthesia record for details)    ESTIMATED BLOOD LOSS:  0 ml    COMPLICATIONS: None    SPECIMENS:  Was Obtained: antral ulcers    HISTORY: The patient is a 76y.o. year old male with history of above preop diagnosis. I recommended esophagogastroduodenoscopy with possible biopsy and I explained the risk, benefits, expected outcome, and alternatives to the procedure. Risks included but are not limited to bleeding, infection, respiratory distress, hypotension, and perforation of the esophagus, stomach, or duodenum. Patient understands and is in agreement. PROCEDURE: The patient was connected to the monitors and given supplemental oxygen by nasal cannula. The patient was sedated. The gastroscope was inserted orally and advanced under direct vision through the esophagus, through the stomach, through the pylorus, and into the descending duodenum. Findings:  Duodenum:     Descending: normal    Bulb: normal    Stomach:    Antrum: abnormal: 2 small healing antral ulcers, no bleeding and biopsies were taken    Body: normal    Fundus: normal    Esophagus: abnormal: 2 cm hiatal hernia from 35 to 37 cm at incisors    Larynx: normal    The scope was removed and the patient tolerated the procedure well. IMPRESSION/PLAN:   Epigastric pain secondary to ulcers--await biopsies.  Start protonix 20 mg daily  Hiatal hernia may contribute to acid reflux      Tierra Iniguez MD, FACS  6/20/2023  12:32 PM

## 2023-06-20 NOTE — OP NOTE
[address] [place] [address]  LOWER ENDOSCOPY REPORT    DATE OF PROCEDURE: 6/20/2023    SURGEON: Shelly Jose M.D.    Madalyn Ceron: None    PREOPERATIVE DIAGNOSIS:  positive fit test    POSTOPERATIVE DIAGNOSIS: Same; moderate sigmoid diverticulosis, prolapsed internal hemorrhoids    OPERATION: Total colonoscopy to cecum      ANESTHESIA: Local monitored anesthesia. ESTIMATED BLOOD LOSS: less than 5 ml    COMPLICATIONS: None. SPECIMENS:  Was Not Obtained    HISTORY: The patient is a 76y.o. year old male with history of above preop diagnosis. I recommended colonoscopy with possible biopsy or polypectomy and I explained the risk, benefits, expected outcome, and alternatives to the procedure. Risks included but are not limited to bleeding, infection, respiratory distress, hypotension, and perforation of the colon. The patient understands and is in agreement. PROCEDURE: The patient was given IV conscious sedation per anesthesia. The patient was given supplemental oxygen by nasal cannula. I performed a digital rectal exam and no masses were palpated . The colonoscope was inserted per rectum and advanced under direct vision to the cecum without difficulty. The prep was excellent so exam was adequate. FINDINGS:  Cecum/Ascending colon: appendiceal orifice noted, iliocecal valve visualized, normal    Transverse colon: normal    Descending/Sigmoid colon: abnormal: moderate prolpased internal hemorrhoids    Rectum/Anus: examined in normal and retroflexed positions and was abnormal: prolapsed internal hemorrhoids    The colon was decompressed and the scope was removed. The withdraw time was approximately 7 minutes. The patient tolerated the procedure well.      ASSESSMENT/PLAN:   High fiber diet  Sigmoid diverticulosis associated with increased risk for lower gi bleeding  Hemorrhoids can bleed  Colorectal Cancer Screening - recommend repeat colonoscopy in 10 years      Shelly Jose MD,

## 2023-08-01 RX ORDER — GLIPIZIDE 2.5 MG/1
TABLET, EXTENDED RELEASE ORAL
Qty: 30 TABLET | Refills: 0 | Status: SHIPPED | OUTPATIENT
Start: 2023-08-01

## 2023-09-13 ENCOUNTER — APPOINTMENT (OUTPATIENT)
Dept: GENERAL RADIOLOGY | Age: 68
End: 2023-09-13
Payer: MEDICARE

## 2023-09-13 ENCOUNTER — HOSPITAL ENCOUNTER (EMERGENCY)
Age: 68
Discharge: HOME | End: 2023-09-13
Payer: MEDICARE

## 2023-09-13 VITALS
WEIGHT: 180 LBS | OXYGEN SATURATION: 99 % | TEMPERATURE: 98.2 F | BODY MASS INDEX: 26.66 KG/M2 | RESPIRATION RATE: 18 BRPM | HEART RATE: 66 BPM | SYSTOLIC BLOOD PRESSURE: 125 MMHG | HEIGHT: 69 IN | DIASTOLIC BLOOD PRESSURE: 77 MMHG

## 2023-09-13 DIAGNOSIS — S50.01XA CONTUSION OF RIGHT ELBOW, INITIAL ENCOUNTER: Primary | ICD-10-CM

## 2023-09-13 PROCEDURE — 99211 OFF/OP EST MAY X REQ PHY/QHP: CPT

## 2023-09-13 PROCEDURE — 73080 X-RAY EXAM OF ELBOW: CPT

## 2023-09-13 ASSESSMENT — PAIN DESCRIPTION - ORIENTATION: ORIENTATION: RIGHT

## 2023-09-13 ASSESSMENT — PAIN - FUNCTIONAL ASSESSMENT: PAIN_FUNCTIONAL_ASSESSMENT: 0-10

## 2023-09-13 ASSESSMENT — PAIN DESCRIPTION - DESCRIPTORS: DESCRIPTORS: ACHING;DISCOMFORT

## 2023-09-13 ASSESSMENT — PAIN DESCRIPTION - LOCATION: LOCATION: ELBOW

## 2023-09-13 ASSESSMENT — PAIN SCALES - GENERAL: PAINLEVEL_OUTOF10: 5

## 2023-09-13 NOTE — ED PROVIDER NOTES
[address] [facility]  EMERGENCY DEPARTMENT ENCOUNTER        NAME: Nabeel Garcia  :  1955  MRN:  [id number]  Date of evaluation: 2023  Provider: Thalia Temple PA-C  PCP: Chidi Correa MD  Note Started : 10:58 AM EDT 23    Chief Complaint: Arm Injury (Right elbow-hit with metal bar )      This is a 70-year-old male that presents to urgent care complaining of right elbow discomfort and swelling for the past day after accidentally hitting his arm on a metal bar. He denies head neck back chest or other extremity pain no numbness or tingling. On first contact patient he appears to be in no acute distress. Review of Systems  Pertinent positives and negatives are stated within HPI, all other systems reviewed and are negative. Allergies: Bee venom, Penicillins, and Adhesive tape     --------------------------------------------- PAST HISTORY ---------------------------------------------  Past Medical History:  has a past medical history of CAD (coronary artery disease), Diabetes mellitus ([address]), Hyperglycemia, Hyperlipidemia, Hypertension, Prolonged emergence from general anesthesia, Renal mass, and Supraventricular tachycardia ([address]). Past Surgical History:  has a past surgical history that includes Foot surgery (); Patella surgery (); Tonsillectomy; fracture surgery; hip surgery (Left); Kidney biopsy (Right, 2016); Colonoscopy; ERCP (N/A, 03/10/2023); ERCP (N/A, 03/10/2023); ERCP (N/A, 03/10/2023); ERCP (N/A, 03/10/2023); Cholecystectomy, laparoscopic (N/A, 2023); Cardiac surgery (10/2022); ERCP (N/A, 2023); ERCP (N/A, 2023); Upper gastrointestinal endoscopy (N/A, 2023); and Colonoscopy (N/A, 2023). Social History:  reports that he has never smoked. He has never used smokeless tobacco. He reports that he does not drink alcohol and does not use drugs.     Family History: family history includes Diabetes in his father; Heart

## 2023-10-02 RX ORDER — GLIPIZIDE 2.5 MG/1
2.5 TABLET, EXTENDED RELEASE ORAL DAILY
Qty: 30 TABLET | Refills: 0 | Status: SHIPPED
Start: 2023-10-02 | End: 2023-10-05 | Stop reason: SDUPTHER

## 2023-10-03 ENCOUNTER — TELEPHONE (OUTPATIENT)
Dept: FAMILY MEDICINE CLINIC | Age: 68
End: 2023-10-03

## 2023-10-03 NOTE — TELEPHONE ENCOUNTER
Dr. David Hoff MD,     Outreach regarding medication adherence. Patient eligible for 100 day-supply of glipizide and atorvastatin to assist with adherence. Order(s) pended for your convenience. Last visit: 04/06/2023, Next visit: unscheduled    See encounter note(s) below for complete details. Please let me know if you have any questions. Thank you,  Des Menchaca, PharmD  PGY2 [facility], toll free: [phone], option 1    =======================================================    POPULATION HEALTH CLINICAL PHARMACY REVIEW: ADHERENCE  Identified care gap per [employer]: fills at [facility]: Diabetes adherence    Patient also appears to be prescribed: Statin    ASSESSMENT  DIABETES ADHERENCE    Insurance Records claims through 09/25/2023 ([address] = 79%; Potential Fail Date: 10/07/2023):   Glipizide ER 2.5 mg daily last filled on 08/01/2023 for 30 day supply. Next refill due: 08/31/2023    Last Rx sent on 10/02/2023 for 30 ds with 0 refills    Lab Results   Component Value Date    LABA1C 6.5 04/06/2023    LABA1C 6.7 (H) 02/15/2020    LABA1C 6.8 (H) 10/17/2019     NOTE A1c <9%     STATIN ADHERENCE    Insurance Records claims through 09/25/2023 ([facility] [address] [place] [address] = 97%; Potential Fail Date: 12/07/2023): Atorvastatin 80 mg daily last filled on 09/04/2023 for 30 day supply. Next refill due: 10/04/2023    Last Rx sent on 08/01/2023 for 30 ds with 1 refills    Lab Results   Component Value Date    CHOL 91 12/06/2022    TRIG 119 12/06/2022    HDL 29 12/06/2022    LDLCALC 38 12/06/2022     ALT   Date Value Ref Range Status   05/04/2023 32 0 - 40 U/L Final     AST   Date Value Ref Range Status   05/04/2023 26 0 - 39 U/L Final     The ASCVD Risk score (Elwin DK, et al., 2019) failed to calculate for the following reasons:     The valid total cholesterol range is 130 to 320 mg/dL      PLAN  The following are interventions that have been identified:

## 2023-10-05 RX ORDER — ATORVASTATIN CALCIUM 80 MG/1
80 TABLET, FILM COATED ORAL DAILY
Qty: 30 TABLET | Refills: 0 | Status: SHIPPED | OUTPATIENT
Start: 2023-10-05

## 2023-10-05 RX ORDER — GLIPIZIDE 2.5 MG/1
2.5 TABLET, EXTENDED RELEASE ORAL DAILY
Qty: 30 TABLET | Refills: 0 | Status: SHIPPED | OUTPATIENT
Start: 2023-10-05

## 2023-10-05 NOTE — TELEPHONE ENCOUNTER
Message left at patients home to call office for an appointment 30 days sent in of requested medication per Dr Gio Stafford order

## 2023-10-25 ENCOUNTER — OFFICE VISIT (OUTPATIENT)
Dept: FAMILY MEDICINE CLINIC | Age: 68
End: 2023-10-25
Payer: MEDICARE

## 2023-10-25 VITALS
OXYGEN SATURATION: 97 % | TEMPERATURE: 97.6 F | DIASTOLIC BLOOD PRESSURE: 80 MMHG | WEIGHT: 185 LBS | SYSTOLIC BLOOD PRESSURE: 118 MMHG | BODY MASS INDEX: 27.32 KG/M2 | HEART RATE: 64 BPM

## 2023-10-25 DIAGNOSIS — I25.110 CORONARY ARTERY DISEASE INVOLVING NATIVE CORONARY ARTERY OF NATIVE HEART WITH UNSTABLE ANGINA PECTORIS (HCC): ICD-10-CM

## 2023-10-25 DIAGNOSIS — E78.2 MIXED HYPERLIPIDEMIA: ICD-10-CM

## 2023-10-25 DIAGNOSIS — I73.9 PVD (PERIPHERAL VASCULAR DISEASE) (HCC): ICD-10-CM

## 2023-10-25 DIAGNOSIS — R73.9 HYPERGLYCEMIA: Primary | ICD-10-CM

## 2023-10-25 DIAGNOSIS — I10 HYPERTENSION, UNSPECIFIED TYPE: ICD-10-CM

## 2023-10-25 PROBLEM — N18.32 STAGE 3B CHRONIC KIDNEY DISEASE (HCC): Status: RESOLVED | Noted: 2018-12-23 | Resolved: 2023-10-25

## 2023-10-25 PROBLEM — C64.1 RENAL CELL CARCINOMA OF RIGHT KIDNEY (HCC): Status: RESOLVED | Noted: 2019-06-03 | Resolved: 2023-10-25

## 2023-10-25 LAB — HBA1C MFR BLD: 6 %

## 2023-10-25 PROCEDURE — 99214 OFFICE O/P EST MOD 30 MIN: CPT | Performed by: FAMILY MEDICINE

## 2023-10-25 PROCEDURE — 3074F SYST BP LT 130 MM HG: CPT | Performed by: FAMILY MEDICINE

## 2023-10-25 PROCEDURE — 3079F DIAST BP 80-89 MM HG: CPT | Performed by: FAMILY MEDICINE

## 2023-10-25 PROCEDURE — 83036 HEMOGLOBIN GLYCOSYLATED A1C: CPT | Performed by: FAMILY MEDICINE

## 2023-10-25 PROCEDURE — 1123F ACP DISCUSS/DSCN MKR DOCD: CPT | Performed by: FAMILY MEDICINE

## 2023-10-25 RX ORDER — GLIPIZIDE 2.5 MG/1
2.5 TABLET, EXTENDED RELEASE ORAL DAILY
Qty: 90 TABLET | Refills: 1 | Status: SHIPPED | OUTPATIENT
Start: 2023-10-25

## 2023-10-25 ASSESSMENT — ENCOUNTER SYMPTOMS
RESPIRATORY NEGATIVE: 1
ALLERGIC/IMMUNOLOGIC NEGATIVE: 1
EYES NEGATIVE: 1
GASTROINTESTINAL NEGATIVE: 1

## 2023-10-25 NOTE — PROGRESS NOTES
heart sounds. Pulmonary:      Effort: Pulmonary effort is normal.      Breath sounds: Normal breath sounds. Abdominal:      General: Bowel sounds are normal.      Palpations: Abdomen is soft. Musculoskeletal:         General: Normal range of motion. Cervical back: Normal range of motion and neck supple. Skin:     General: Skin is warm and dry. Neurological:      Mental Status: He is alert and oriented to person, place, and time. Psychiatric:         Behavior: Behavior normal.            Labs :    Lab Results   Component Value Date    WBC 9.1 10/20/2023    HGB 14.9 10/20/2023    HCT 45.2 10/20/2023     10/20/2023    CHOL 86 10/20/2023    TRIG 107 10/20/2023    HDL 31 (L) 10/20/2023    ALT 28 10/20/2023    AST 28 10/20/2023     10/20/2023    K 4.2 10/20/2023     10/20/2023    CREATININE 1.2 10/20/2023    BUN 16 10/20/2023    CO2 21 (L) 10/20/2023    PSA 1.39 11/16/2016    INR 1.0 02/15/2018    LABA1C 6.0 10/25/2023     No results found for: \"VOL\", \"APPEARANCE\", \"COLORU\", \"LABSPEC\", \"LABPH\", \"LEUKBLD\", \"NITRU\", \"GLUCOSEU\", \"KETUA\", \"UROBILINOGEN\", \"BILIRUBINUR\", \"OCBU\"  Lab Results   Component Value Date/Time    PSA 1.39 11/16/2016 12:00 PM         Controlled Substances Monitoring:                                    ASSESSMENT     Patient Active Problem List    Diagnosis Date Noted    Choledocholithiasis 03/09/2023    Lung nodule 10/07/2022    Diabetes mellitus without complication ([address]) 69/38/4840    H/O partial nephrectomy 06/10/2021    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  Hyperglycemia  -     glipiZIDE

## 2023-10-25 NOTE — PATIENT INSTRUCTIONS
Continue present treatment  Low-sodium low-fat low-carb diet  Regular exercises  Vascular referral has been made  Return to clinic earlier if any problems

## 2023-10-26 ENCOUNTER — TELEPHONE (OUTPATIENT)
Dept: VASCULAR SURGERY | Age: 68
End: 2023-10-26

## 2023-11-02 ENCOUNTER — OFFICE VISIT (OUTPATIENT)
Dept: FAMILY MEDICINE CLINIC | Age: 68
End: 2023-11-02
Payer: MEDICARE

## 2023-11-02 VITALS
BODY MASS INDEX: 28.35 KG/M2 | DIASTOLIC BLOOD PRESSURE: 80 MMHG | SYSTOLIC BLOOD PRESSURE: 116 MMHG | HEART RATE: 64 BPM | WEIGHT: 192 LBS | OXYGEN SATURATION: 96 % | TEMPERATURE: 97 F

## 2023-11-02 DIAGNOSIS — Z00.00 WELCOME TO MEDICARE PREVENTIVE VISIT: Primary | ICD-10-CM

## 2023-11-02 PROCEDURE — G0402 INITIAL PREVENTIVE EXAM: HCPCS | Performed by: FAMILY MEDICINE

## 2023-11-02 PROCEDURE — 3074F SYST BP LT 130 MM HG: CPT | Performed by: FAMILY MEDICINE

## 2023-11-02 PROCEDURE — 3079F DIAST BP 80-89 MM HG: CPT | Performed by: FAMILY MEDICINE

## 2023-11-02 PROCEDURE — 1123F ACP DISCUSS/DSCN MKR DOCD: CPT | Performed by: FAMILY MEDICINE

## 2023-11-02 ASSESSMENT — LIFESTYLE VARIABLES
HOW OFTEN DO YOU HAVE A DRINK CONTAINING ALCOHOL: NEVER
HOW MANY STANDARD DRINKS CONTAINING ALCOHOL DO YOU HAVE ON A TYPICAL DAY: PATIENT DOES NOT DRINK

## 2023-11-02 ASSESSMENT — PATIENT HEALTH QUESTIONNAIRE - PHQ9
SUM OF ALL RESPONSES TO PHQ QUESTIONS 1-9: 0
SUM OF ALL RESPONSES TO PHQ QUESTIONS 1-9: 0
2. FEELING DOWN, DEPRESSED OR HOPELESS: 0
SUM OF ALL RESPONSES TO PHQ9 QUESTIONS 1 & 2: 0
SUM OF ALL RESPONSES TO PHQ QUESTIONS 1-9: 0
SUM OF ALL RESPONSES TO PHQ QUESTIONS 1-9: 0
1. LITTLE INTEREST OR PLEASURE IN DOING THINGS: 0

## 2023-11-16 ENCOUNTER — OFFICE VISIT (OUTPATIENT)
Dept: VASCULAR SURGERY | Age: 68
End: 2023-11-16
Payer: MEDICARE

## 2023-11-16 VITALS — WEIGHT: 178 LBS | BODY MASS INDEX: 26.29 KG/M2

## 2023-11-16 DIAGNOSIS — R09.89 DECREASED DORSALIS PEDIS PULSE: Primary | ICD-10-CM

## 2023-11-16 DIAGNOSIS — R68.89 ABNORMAL ANKLE BRACHIAL INDEX: ICD-10-CM

## 2023-11-16 PROCEDURE — 1123F ACP DISCUSS/DSCN MKR DOCD: CPT | Performed by: SURGERY

## 2023-11-16 PROCEDURE — 99204 OFFICE O/P NEW MOD 45 MIN: CPT | Performed by: SURGERY

## 2023-11-16 NOTE — PROGRESS NOTES
Not on file     Social Determinants of Health     Financial Resource Strain: Low Risk  (3/16/2023)    Overall Financial Resource Strain (CARDIA)     Difficulty of Paying Living Expenses: Not hard at all   Food Insecurity: No Food Insecurity (3/16/2023)    Hunger Vital Sign     Worried About Running Out of Food in the Last Year: Never true     Ran Out of Food in the Last Year: Never true   Transportation Needs: Unknown (3/16/2023)    PRAPARE - Transportation     Lack of Transportation (Medical): Not on file     Lack of Transportation (Non-Medical): No   Physical Activity: Sufficiently Active (11/2/2023)    Exercise Vital Sign     Days of Exercise per Week: 5 days     Minutes of Exercise per Session: 30 min   Stress: Not on file   Social Connections: Not on file   Intimate Partner Violence: Not on file   Housing Stability: Unknown (3/16/2023)    Housing Stability Vital Sign     Unable to Pay for Housing in the Last Year: Not on file     Number of [address] in the Last Year: Not on file     Unstable Housing in the Last Year: No       Review of Systems:  Skin:  No abnormal pigmentation or rash  Eyes:  No blurring, diplopia or vision loss  Ears/Nose/Throat:  No hearing loss or vertigo  Respiratory:  No cough, pleuritic chest pain, dyspnea, or wheezing. Cardiovascular: No angina, palpitations . Coronary artery disease postcardiac bypass surgery, hypertension, hyperlipidemia  Gastrointestinal:  No nausea or vomiting; no abdominal pain or rectal bleeding  Musculoskeletal:  No arthritis or weakness. Neurologic:  No paralysis, paresis, paresthesia, seizures or headaches  Hematologic/Lymphatic/Immunologic:  No anemia, abnormal bleeding/bruising, fever, chills or night sweats. Endocrine:  No heat or cold intolerance. No polyphagia, polydipsia or polyuria. Diabetes mellitus      Physical Exam:  General appearance:  Alert, awake, oriented x 3. No distress. Skin:  Warm and dry  Head:  Normocephalic.   No masses,

## 2023-11-17 ENCOUNTER — TELEPHONE (OUTPATIENT)
Dept: VASCULAR SURGERY | Age: 68
End: 2023-11-17

## 2023-12-28 ENCOUNTER — HOSPITAL ENCOUNTER (OUTPATIENT)
Dept: INTERVENTIONAL RADIOLOGY/VASCULAR | Age: 68
Discharge: HOME | End: 2023-12-30
Payer: MEDICARE

## 2023-12-28 DIAGNOSIS — R09.89 DECREASED DORSALIS PEDIS PULSE: ICD-10-CM

## 2023-12-28 DIAGNOSIS — R68.89 ABNORMAL ANKLE BRACHIAL INDEX: ICD-10-CM

## 2023-12-28 PROCEDURE — 93922 UPR/L XTREMITY ART 2 LEVELS: CPT

## 2023-12-29 RX ORDER — ATORVASTATIN CALCIUM 80 MG/1
80 TABLET, FILM COATED ORAL DAILY
Qty: 90 TABLET | Refills: 0 | Status: SHIPPED | OUTPATIENT
Start: 2023-12-29

## 2023-12-29 NOTE — TELEPHONE ENCOUNTER
Patient called for refill  Last seen 11/2/2023  Next appt 2/9/2024  [facility]

## 2024-01-02 ENCOUNTER — TELEPHONE (OUTPATIENT)
Dept: VASCULAR SURGERY | Age: 69
End: 2024-01-02

## 2024-01-02 NOTE — TELEPHONE ENCOUNTER
Pt. Called would like results of PROSPER done on 12-31-23  at [facility]. Please call @ [phone].  Thank You, Sarah

## 2024-01-02 NOTE — TELEPHONE ENCOUNTER
Patient underwent an ankle-brachial index at the hospital because of abnormal results, when done by the nursing staff from the patient's insurance company    Patient had clinically normal bounding pulses, over the posterior tibial artery bilaterally    The ankle-brachial index that was done in the hospital was normal with normal triphasic ankle Doppler tracings and good arterial flow to both feet including the toes based upon the pulse volume recordings      The patient was reassured from a vascular perspective and was instructed to call me as needed if he has any questions or concerns in the future    All his questions were answered

## 2024-02-03 ENCOUNTER — HOSPITAL ENCOUNTER (OUTPATIENT)
Age: 69
Discharge: HOME | End: 2024-02-03
Payer: MEDICARE

## 2024-02-03 DIAGNOSIS — R73.9 HYPERGLYCEMIA: ICD-10-CM

## 2024-02-03 DIAGNOSIS — I25.110 CORONARY ARTERY DISEASE INVOLVING NATIVE CORONARY ARTERY OF NATIVE HEART WITH UNSTABLE ANGINA PECTORIS (HCC): ICD-10-CM

## 2024-02-03 DIAGNOSIS — I10 HYPERTENSION, UNSPECIFIED TYPE: ICD-10-CM

## 2024-02-03 DIAGNOSIS — E78.2 MIXED HYPERLIPIDEMIA: ICD-10-CM

## 2024-02-03 LAB
ALBUMIN SERPL-MCNC: 4.3 G/DL (ref 3.5–5.2)
ALP SERPL-CCNC: 156 U/L (ref 40–129)
ALT SERPL-CCNC: 25 U/L (ref 0–40)
ANION GAP SERPL CALCULATED.3IONS-SCNC: 9 MMOL/L (ref 7–16)
AST SERPL-CCNC: 22 U/L (ref 0–39)
BASOPHILS # BLD: 0.04 K/UL (ref 0–0.2)
BASOPHILS NFR BLD: 0 % (ref 0–2)
BILIRUB SERPL-MCNC: 0.9 MG/DL (ref 0–1.2)
BUN SERPL-MCNC: 18 MG/DL (ref 6–23)
CALCIUM SERPL-MCNC: 9.3 MG/DL (ref 8.6–10.2)
CHLORIDE SERPL-SCNC: 105 MMOL/L (ref 98–107)
CHOLEST SERPL-MCNC: 86 MG/DL
CO2 SERPL-SCNC: 28 MMOL/L (ref 22–29)
CREAT SERPL-MCNC: 1.1 MG/DL (ref 0.7–1.2)
EOSINOPHIL # BLD: 0.16 K/UL (ref 0.05–0.5)
EOSINOPHILS RELATIVE PERCENT: 2 % (ref 0–6)
ERYTHROCYTE [DISTWIDTH] IN BLOOD BY AUTOMATED COUNT: 13.1 % (ref 12–16)
GFR SERPL CREATININE-BSD FRML MDRD: >60 ML/MIN/1.73M2
GLUCOSE SERPL-MCNC: 143 MG/DL (ref 74–99)
HCT VFR BLD AUTO: 42.7 % (ref 37–54)
HDLC SERPL-MCNC: 31 MG/DL
HGB BLD-MCNC: 14.1 G/DL (ref 12.5–16.5)
IMM GRANULOCYTES # BLD AUTO: <0.03 K/UL (ref 0–0.58)
IMM GRANULOCYTES NFR BLD: 0 % (ref 0–5)
LDLC SERPL CALC-MCNC: 40 MG/DL
LYMPHOCYTES NFR BLD: 1.29 K/UL (ref 1.5–4)
LYMPHOCYTES RELATIVE PERCENT: 13 % (ref 20–42)
MCH RBC QN AUTO: 30.3 PG (ref 26–35)
MCHC RBC AUTO-ENTMCNC: 33 G/DL (ref 32–34.5)
MCV RBC AUTO: 91.8 FL (ref 80–99.9)
MONOCYTES NFR BLD: 0.68 K/UL (ref 0.1–0.95)
MONOCYTES NFR BLD: 7 % (ref 2–12)
NEUTROPHILS NFR BLD: 79 % (ref 43–80)
NEUTS SEG NFR BLD: 8.08 K/UL (ref 1.8–7.3)
PLATELET # BLD AUTO: 210 K/UL (ref 130–450)
PMV BLD AUTO: 9.8 FL (ref 7–12)
POTASSIUM SERPL-SCNC: 4.4 MMOL/L (ref 3.5–5)
PROT SERPL-MCNC: 7.1 G/DL (ref 6.4–8.3)
RBC # BLD AUTO: 4.65 M/UL (ref 3.8–5.8)
SODIUM SERPL-SCNC: 142 MMOL/L (ref 132–146)
TRIGL SERPL-MCNC: 77 MG/DL
VLDLC SERPL CALC-MCNC: 15 MG/DL
WBC OTHER # BLD: 10.3 K/UL (ref 4.5–11.5)

## 2024-02-03 PROCEDURE — 80061 LIPID PANEL: CPT

## 2024-02-03 PROCEDURE — 36415 COLL VENOUS BLD VENIPUNCTURE: CPT

## 2024-02-03 PROCEDURE — 85025 COMPLETE CBC W/AUTO DIFF WBC: CPT

## 2024-02-03 PROCEDURE — 80053 COMPREHEN METABOLIC PANEL: CPT

## 2024-02-09 ENCOUNTER — OFFICE VISIT (OUTPATIENT)
Dept: FAMILY MEDICINE CLINIC | Age: 69
End: 2024-02-09

## 2024-02-09 VITALS
HEART RATE: 67 BPM | OXYGEN SATURATION: 96 % | WEIGHT: 185 LBS | DIASTOLIC BLOOD PRESSURE: 60 MMHG | SYSTOLIC BLOOD PRESSURE: 110 MMHG | TEMPERATURE: 97.8 F | BODY MASS INDEX: 27.4 KG/M2 | HEIGHT: 69 IN

## 2024-02-09 VITALS
SYSTOLIC BLOOD PRESSURE: 110 MMHG | OXYGEN SATURATION: 96 % | WEIGHT: 185 LBS | BODY MASS INDEX: 27.32 KG/M2 | TEMPERATURE: 97.8 F | DIASTOLIC BLOOD PRESSURE: 60 MMHG | HEART RATE: 67 BPM

## 2024-02-09 DIAGNOSIS — E11.9 DIABETES MELLITUS WITHOUT COMPLICATION (HCC): Primary | ICD-10-CM

## 2024-02-09 DIAGNOSIS — I25.110 CORONARY ARTERY DISEASE INVOLVING NATIVE CORONARY ARTERY OF NATIVE HEART WITH UNSTABLE ANGINA PECTORIS (HCC): ICD-10-CM

## 2024-02-09 DIAGNOSIS — E78.2 MIXED HYPERLIPIDEMIA: ICD-10-CM

## 2024-02-09 DIAGNOSIS — I10 HYPERTENSION, UNSPECIFIED TYPE: ICD-10-CM

## 2024-02-09 DIAGNOSIS — R73.9 HYPERGLYCEMIA: ICD-10-CM

## 2024-02-09 DIAGNOSIS — I73.9 PVD (PERIPHERAL VASCULAR DISEASE) (HCC): ICD-10-CM

## 2024-02-09 DIAGNOSIS — Z00.00 INITIAL MEDICARE ANNUAL WELLNESS VISIT: Primary | ICD-10-CM

## 2024-02-09 LAB — HBA1C MFR BLD: 6.2 %

## 2024-02-09 ASSESSMENT — ENCOUNTER SYMPTOMS
ALLERGIC/IMMUNOLOGIC NEGATIVE: 1
GASTROINTESTINAL NEGATIVE: 1
RESPIRATORY NEGATIVE: 1
EYES NEGATIVE: 1

## 2024-02-09 ASSESSMENT — PATIENT HEALTH QUESTIONNAIRE - PHQ9
SUM OF ALL RESPONSES TO PHQ QUESTIONS 1-9: 0
SUM OF ALL RESPONSES TO PHQ QUESTIONS 1-9: 0
1. LITTLE INTEREST OR PLEASURE IN DOING THINGS: 0
2. FEELING DOWN, DEPRESSED OR HOPELESS: 0
SUM OF ALL RESPONSES TO PHQ QUESTIONS 1-9: 0
SUM OF ALL RESPONSES TO PHQ9 QUESTIONS 1 & 2: 0
SUM OF ALL RESPONSES TO PHQ QUESTIONS 1-9: 0

## 2024-02-09 NOTE — PROGRESS NOTES
OFFICE PROGRESS NOTE      SUBJECTIVE:        Patient ID:   Asim Mora is a 69 y.o. male who presents for   Chief Complaint   Patient presents with    Hypertension     Here for recheck on Hypertension, Hyperlipidemia and DM. Lab work done,here for review.           HPI:   Patient here for the follow-up  Fasting sugar 143  Lipids abnormal  Hemoglobin A1c today 6.2  Patient trying to follow the diet and exercise  Denies any chest pain  Blood pressure 110/60  Stable with the medication      Prior to Visit Medications    Medication Sig Taking? Authorizing Provider   atorvastatin (LIPITOR) 80 MG tablet Take 1 tablet by mouth daily Yes Yemi Rg MD   glipiZIDE (GLUCOTROL XL) 2.5 MG extended release tablet Take 1 tablet by mouth daily Yes Yemi Rg MD   pantoprazole (PROTONIX) 20 MG tablet Take 1 tablet by mouth every morning (before breakfast) Yes Fortino Iqbal MD   turmeric 500 MG CAPS Take 2 capsules by mouth daily Yes Clayton Meza MD   Multiple Vitamins-Minerals (THERAPEUTIC MULTIVITAMIN-MINERALS) tablet Take 1 tablet by mouth daily Yes Clayton Meza MD   metoprolol succinate (TOPROL XL) 100 MG extended release tablet Take 1 tablet by mouth daily Yes Clayton Meza MD   aspirin 81 MG tablet Take 1 tablet by mouth daily Yes Clayton Meza MD      Social History     Socioeconomic History    Marital status:      Spouse name: None    Number of children: None    Years of education: None    Highest education level: None   Tobacco Use    Smoking status: Never    Smokeless tobacco: Never   Vaping Use    Vaping Use: Never used   Substance and Sexual Activity    Alcohol use: No    Drug use: No     Social Determinants of Health     Financial Resource Strain: Low Risk  (3/16/2023)    Overall Financial Resource Strain (CARDIA)     Difficulty of Paying Living Expenses: Not hard at all   Transportation Needs: Unknown (3/16/2023)

## 2024-02-09 NOTE — PROGRESS NOTES
Medicare Annual Wellness Visit    Asim Mora is here for Medicare AWV (Here for AWV)    Assessment & Plan   Initial Medicare annual wellness visit  Recommendations for Preventive Services Due: see orders and patient instructions/AVS.  Recommended screening schedule for the next 5-10 years is provided to the patient in written form: see Patient Instructions/AVS.     Return in 1 year (on 2/9/2025) for Medicare Annual Wellness Visit in 1 year.     Subjective   The following acute and/or chronic problems were also addressed today:      Patient's complete Health Risk Assessment and screening values have been reviewed and are found in Flowsheets. The following problems were reviewed today and where indicated follow up appointments were made and/or referrals ordered.    Positive Risk Factor Screenings with Interventions:                      Advanced Directives:  Do you have a Living Will?: (!) No    Intervention:  has NO advanced directive - information provided                     Objective   Vitals:    02/09/24 1423   BP: 110/60   Pulse: 67   Temp: 97.8 °F (36.6 °C)   TempSrc: Infrared   SpO2: 96%   Weight: 83.9 kg (185 lb)   Height: 1.753 m (5' 9\")      Body mass index is 27.32 kg/m².               Allergies   Allergen Reactions    Bee Venom Shortness Of Breath    Penicillins Shortness Of Breath and Rash    Adhesive Tape Other (See Comments)     Skin blisters.     Prior to Visit Medications    Medication Sig Taking? Authorizing Provider   atorvastatin (LIPITOR) 80 MG tablet Take 1 tablet by mouth daily Yes Yemi Rg MD   glipiZIDE (GLUCOTROL XL) 2.5 MG extended release tablet Take 1 tablet by mouth daily Yes Yemi Rg MD   pantoprazole (PROTONIX) 20 MG tablet Take 1 tablet by mouth every morning (before breakfast) Yes Fortino Iqbal MD   turmeric 500 MG CAPS Take 2 capsules by mouth daily Yes Provider, MD Clayton   Multiple Vitamins-Minerals (THERAPEUTIC MULTIVITAMIN-MINERALS) tablet Take 1

## 2024-02-09 NOTE — PATIENT INSTRUCTIONS
Continue present treatment  Low-sodium low-fat low-carb  Regular exercise  Follow-up with cardiology  Lab work before the next visit  Return to clinic earlier if any problems

## 2024-02-09 NOTE — PATIENT INSTRUCTIONS
benefits include a comprehensive review of your medical history including lifestyle, illnesses that may run in your family, and various assessments and screenings as appropriate.    After reviewing your medical record and screening and assessments performed today your provider may have ordered immunizations, labs, imaging, and/or referrals for you.  A list of these orders (if applicable) as well as your Preventive Care list are included within your After Visit Summary for your review.    Other Preventive Recommendations:    A preventive eye exam performed by an eye specialist is recommended every 1-2 years to screen for glaucoma; cataracts, macular degeneration, and other eye disorders.  A preventive dental visit is recommended every 6 months.  Try to get at least 150 minutes of exercise per week or 10,000 steps per day on a pedometer .  Order or download the FREE \"Exercise & Physical Activity: Your Everyday Guide\" from [employer]. Call 1-365.487.1848 or search [employer] online.  You need 3659-6064 mg of calcium and 8918-3542 IU of vitamin D per day. It is possible to meet your calcium requirement with diet alone, but a vitamin D supplement is usually necessary to meet this goal.  When exposed to the sun, use a sunscreen that protects against both UVA and UVB radiation with an SPF of 30 or greater. Reapply every 2 to 3 hours or after sweating, drying off with a towel, or swimming.  Always wear a seat belt when traveling in a car. Always wear a helmet when riding a bicycle or motorcycle.

## 2024-02-13 ENCOUNTER — OFFICE VISIT (OUTPATIENT)
Dept: FAMILY MEDICINE CLINIC | Age: 69
End: 2024-02-13
Payer: MEDICARE

## 2024-02-13 VITALS
DIASTOLIC BLOOD PRESSURE: 72 MMHG | RESPIRATION RATE: 18 BRPM | HEART RATE: 62 BPM | TEMPERATURE: 97.3 F | BODY MASS INDEX: 27.4 KG/M2 | SYSTOLIC BLOOD PRESSURE: 113 MMHG | OXYGEN SATURATION: 98 % | WEIGHT: 185 LBS | HEIGHT: 69 IN

## 2024-02-13 DIAGNOSIS — H10.33 ACUTE BACTERIAL CONJUNCTIVITIS OF BOTH EYES: Primary | ICD-10-CM

## 2024-02-13 PROCEDURE — 3074F SYST BP LT 130 MM HG: CPT

## 2024-02-13 PROCEDURE — 1123F ACP DISCUSS/DSCN MKR DOCD: CPT

## 2024-02-13 PROCEDURE — 99213 OFFICE O/P EST LOW 20 MIN: CPT

## 2024-02-13 PROCEDURE — 3078F DIAST BP <80 MM HG: CPT

## 2024-02-13 RX ORDER — OFLOXACIN 3 MG/ML
2 SOLUTION/ DROPS OPHTHALMIC 4 TIMES DAILY
Qty: 10 ML | Refills: 0 | Status: SHIPPED | OUTPATIENT
Start: 2024-02-13 | End: 2024-03-09

## 2024-02-13 NOTE — PROGRESS NOTES
Chief Complaint   Eye Problem (Both eyes are red and irritated the right is worse than left )      History of Present Illness   Source of history provided by:  patient.      Asim Mora is a 69 y.o. old male presenting to the walk in clinic with redness, itching, mild irritation, and abnormal drainage to the bilaterally eye for the past 1 day.  States there was no obvious FB exposure. Denies a recent URI within the past week. Denies any visual changes, visual loss, HA, ear pain, fever, chills, N/V, or lethargy. Denies reports contact lens use, spontaneous onset, close contact with similar symptoms, or work related exposure. Denies history of glaucoma or recent eye surgery.    ROS    Unless otherwise stated in this report or unable to obtain because of the patient's clinical or mental status as evidenced by the medical record, this patients's positive and negative responses for Review of Systems, constitutional, psych, eyes, ENT, cardiovascular, respiratory, gastrointestinal, neurological, genitourinary, musculoskeletal, integument systems and systems related to the presenting problem are either stated in the preceding or were not pertinent or were negative for the symptoms and/or complaints related to the medical problem.    Physical Exam         VS:  /72   Pulse 62   Temp 97.3 °F (36.3 °C) (Temporal)   Resp 18   Ht 1.753 m (5' 9.02\")   Wt 83.9 kg (185 lb)   SpO2 98%   BMI 27.31 kg/m²    Oxygen Saturation Interpretation: Normal.    Constitutional:  Alert, development consistent with age.  HENT:  NC/NT.  Airway patent. Neck supple.   Eyes: Normal extraocular motion, pupils were equal round and reactive to light, the patient had no pain with extraocular motion. There was evidence of conjunctival injection with purulent discharge on bilateral eyes. No evidence of preseptal cellulitis or orbital cellulitis. Visual acuity grossly intact.     Lungs: CTAB without wheezing, rales, or rhonchi  Heart:

## 2024-02-24 ENCOUNTER — HOSPITAL ENCOUNTER (EMERGENCY)
Age: 69
Discharge: HOME | End: 2024-02-24
Payer: MEDICARE

## 2024-02-24 VITALS
WEIGHT: 171 LBS | BODY MASS INDEX: 25.24 KG/M2 | RESPIRATION RATE: 18 BRPM | HEART RATE: 73 BPM | OXYGEN SATURATION: 99 % | DIASTOLIC BLOOD PRESSURE: 80 MMHG | TEMPERATURE: 97.5 F | SYSTOLIC BLOOD PRESSURE: 119 MMHG

## 2024-02-24 DIAGNOSIS — R68.89 FLU-LIKE SYMPTOMS: Primary | ICD-10-CM

## 2024-02-24 PROCEDURE — 99211 OFF/OP EST MAY X REQ PHY/QHP: CPT

## 2024-02-24 RX ORDER — BENZONATATE 100 MG/1
100 CAPSULE ORAL 3 TIMES DAILY PRN
Qty: 30 CAPSULE | Refills: 0 | Status: SHIPPED | OUTPATIENT
Start: 2024-02-24 | End: 2024-03-05

## 2024-02-24 RX ORDER — ONDANSETRON 4 MG/1
4 TABLET, ORALLY DISINTEGRATING ORAL 3 TIMES DAILY PRN
Qty: 21 TABLET | Refills: 0 | Status: SHIPPED | OUTPATIENT
Start: 2024-02-24

## 2024-02-24 NOTE — ED PROVIDER NOTES
-----------------------------ED Course / Medical Decision Making Section--------------------------  ED Course Medications:  Medications - No data to display    Medical Decision Making:   Flu like symptoms. No evidence of PTA, RP abscess, epiglottitis, ludwigs angina, or other life threatening or deep space infection or airway compromise.  No meningeal signs on physical exam or concern for meningitis at this time.  No chest pain or shortness of breath.  Patient is nontoxic-appearing, in no acute distress, and neurovascularly intact.  At this time we will plan on outpatient management with Tessalon Perles and Zofran.  Advised follow very closely with PCP for recheck and return the emergency department with any new or worsening symptoms.  Patient and his wife voiced understanding and are agreeable to the above treatment plan.    Counseling:   The emergency provider has spoken with the patient and spouse/SO and discussed today’s results, in addition to providing specific details for the plan of care and counseling regarding the diagnosis and prognosis.  Questions are answered at this time and they are agreeable with the plan.  ------------------------------------Impression & Disposition Section------------------------------------  Impression(s):  1. Flu-like symptoms        Disposition:  Disposition: Discharge to home  Patient condition is stable    Discharge Medication List as of 2/24/2024 10:03 AM        START taking these medications    Details   benzonatate (TESSALON) 100 MG capsule Take 1 capsule by mouth 3 times daily as needed for Cough, Disp-30 capsule, R-0Normal      ondansetron (ZOFRAN-ODT) 4 MG disintegrating tablet Take 1 tablet by mouth 3 times daily as needed for Nausea or Vomiting, Disp-21 tablet, R-0Normal           * NOTE: This report was transcribed using voice recognition software. Every effort was made to ensure accuracy; however, inadvertent computerized transcription errors may be  present.            Deena Lutz PA  02/24/24 1044

## 2024-03-01 ENCOUNTER — ENROLLMENT (OUTPATIENT)
Dept: PHARMACY | Facility: CLINIC | Age: 69
End: 2024-03-01

## 2024-04-03 RX ORDER — ATORVASTATIN CALCIUM 80 MG/1
80 TABLET, FILM COATED ORAL DAILY
Qty: 90 TABLET | Refills: 0 | Status: SHIPPED | OUTPATIENT
Start: 2024-04-03

## 2024-04-23 DIAGNOSIS — R73.9 HYPERGLYCEMIA: ICD-10-CM

## 2024-04-23 RX ORDER — GLIPIZIDE 2.5 MG/1
2.5 TABLET, EXTENDED RELEASE ORAL DAILY
Qty: 90 TABLET | Refills: 1 | Status: SHIPPED | OUTPATIENT
Start: 2024-04-23

## 2024-04-23 RX ORDER — GLIPIZIDE 2.5 MG/1
2.5 TABLET, EXTENDED RELEASE ORAL DAILY
Qty: 90 TABLET | Refills: 0 | Status: SHIPPED | OUTPATIENT
Start: 2024-04-23

## 2024-05-24 DIAGNOSIS — E78.2 MIXED HYPERLIPIDEMIA: ICD-10-CM

## 2024-05-24 DIAGNOSIS — I10 HYPERTENSION, UNSPECIFIED TYPE: ICD-10-CM

## 2024-05-24 DIAGNOSIS — E11.9 DIABETES MELLITUS WITHOUT COMPLICATION (HCC): ICD-10-CM

## 2024-05-24 LAB
ALBUMIN: 4.4 G/DL (ref 3.5–5.2)
ALP BLD-CCNC: 116 U/L (ref 40–129)
ALT SERPL-CCNC: 27 U/L (ref 0–40)
ANION GAP SERPL CALCULATED.3IONS-SCNC: 12 MMOL/L (ref 7–16)
AST SERPL-CCNC: 32 U/L (ref 0–39)
BILIRUB SERPL-MCNC: 1.2 MG/DL (ref 0–1.2)
BUN BLDV-MCNC: 18 MG/DL (ref 6–23)
CALCIUM SERPL-MCNC: 9.2 MG/DL (ref 8.6–10.2)
CHLORIDE BLD-SCNC: 103 MMOL/L (ref 98–107)
CHOLESTEROL, TOTAL: 86 MG/DL
CO2: 23 MMOL/L (ref 22–29)
CREAT SERPL-MCNC: 1.3 MG/DL (ref 0.7–1.2)
GFR, ESTIMATED: 59 ML/MIN/1.73M2
GLUCOSE BLD-MCNC: 141 MG/DL (ref 74–99)
HCT VFR BLD CALC: 45.6 % (ref 37–54)
HDLC SERPL-MCNC: 29 MG/DL
HEMOGLOBIN: 14.7 G/DL (ref 12.5–16.5)
LDL CHOLESTEROL: 34 MG/DL
MCH RBC QN AUTO: 31.1 PG (ref 26–35)
MCHC RBC AUTO-ENTMCNC: 32.2 G/DL (ref 32–34.5)
MCV RBC AUTO: 96.4 FL (ref 80–99.9)
PDW BLD-RTO: 14 % (ref 11.5–15)
PLATELET # BLD: 167 K/UL (ref 130–450)
PMV BLD AUTO: 10.5 FL (ref 7–12)
POTASSIUM SERPL-SCNC: 4.6 MMOL/L (ref 3.5–5)
RBC # BLD: 4.73 M/UL (ref 3.8–5.8)
SODIUM BLD-SCNC: 138 MMOL/L (ref 132–146)
TOTAL PROTEIN: 7 G/DL (ref 6.4–8.3)
TRIGL SERPL-MCNC: 117 MG/DL
VLDLC SERPL CALC-MCNC: 23 MG/DL
WBC # BLD: 8.7 K/UL (ref 4.5–11.5)

## 2024-05-30 ENCOUNTER — OFFICE VISIT (OUTPATIENT)
Dept: FAMILY MEDICINE CLINIC | Age: 69
End: 2024-05-30

## 2024-05-30 VITALS
SYSTOLIC BLOOD PRESSURE: 128 MMHG | OXYGEN SATURATION: 96 % | TEMPERATURE: 97.7 F | DIASTOLIC BLOOD PRESSURE: 80 MMHG | WEIGHT: 188 LBS | BODY MASS INDEX: 27.75 KG/M2 | HEART RATE: 62 BPM

## 2024-05-30 DIAGNOSIS — E11.9 DIABETES MELLITUS WITHOUT COMPLICATION (HCC): Primary | ICD-10-CM

## 2024-05-30 DIAGNOSIS — E78.2 MIXED HYPERLIPIDEMIA: ICD-10-CM

## 2024-05-30 DIAGNOSIS — I25.110 CORONARY ARTERY DISEASE INVOLVING NATIVE CORONARY ARTERY OF NATIVE HEART WITH UNSTABLE ANGINA PECTORIS (HCC): ICD-10-CM

## 2024-05-30 DIAGNOSIS — I10 HYPERTENSION, UNSPECIFIED TYPE: ICD-10-CM

## 2024-05-30 LAB — HBA1C MFR BLD: 6.2 %

## 2024-05-30 SDOH — ECONOMIC STABILITY: INCOME INSECURITY: HOW HARD IS IT FOR YOU TO PAY FOR THE VERY BASICS LIKE FOOD, HOUSING, MEDICAL CARE, AND HEATING?: NOT VERY HARD

## 2024-05-30 SDOH — ECONOMIC STABILITY: FOOD INSECURITY: WITHIN THE PAST 12 MONTHS, THE FOOD YOU BOUGHT JUST DIDN'T LAST AND YOU DIDN'T HAVE MONEY TO GET MORE.: NEVER TRUE

## 2024-05-30 SDOH — ECONOMIC STABILITY: FOOD INSECURITY: WITHIN THE PAST 12 MONTHS, YOU WORRIED THAT YOUR FOOD WOULD RUN OUT BEFORE YOU GOT MONEY TO BUY MORE.: NEVER TRUE

## 2024-05-30 NOTE — PATIENT INSTRUCTIONS
Continue same treatment  Low-sodium low-fat low-carb  Follow-up with the consultant  Lab work before the next visit  Annual eye examination get the report  Return to clinic earlier if any problems

## 2024-05-30 NOTE — PROGRESS NOTES
OFFICE PROGRESS NOTE      SUBJECTIVE:        Patient ID:   Asim Mora is a 69 y.o. male who presents for   Chief Complaint   Patient presents with    Diabetes     Here for recheck on DM,Hypertension and Hyperlipidemia. Lab work done,here for review.           HPI:   Patient here for the follow-up  States he has been okay  Creatinine 1.3  Fasting sugar 141  Lipids are normal  Hemoglobin A1c 6.2  Patient denies any chest pain  Blood pressure 128/80 stable with the medication      Prior to Visit Medications    Medication Sig Taking? Authorizing Provider   glipiZIDE (GLUCOTROL XL) 2.5 MG extended release tablet TAKE ONE TABLET BY MOUTH DAILY Yes Yemi Rg MD   glipiZIDE (GLUCOTROL XL) 2.5 MG extended release tablet Take 1 tablet by mouth daily Yes Yemi Rg MD   atorvastatin (LIPITOR) 80 MG tablet Take 1 tablet by mouth daily Yes Yemi Rg MD   pantoprazole (PROTONIX) 20 MG tablet Take 1 tablet by mouth every morning (before breakfast) Yes Fortino Iqbal MD   turmeric 500 MG CAPS Take 2 capsules by mouth daily Yes Clayton Meza MD   Multiple Vitamins-Minerals (THERAPEUTIC MULTIVITAMIN-MINERALS) tablet Take 1 tablet by mouth daily Yes Clayton Meza MD   metoprolol succinate (TOPROL XL) 100 MG extended release tablet Take 1 tablet by mouth daily Yes Clayton Meza MD   aspirin 81 MG tablet Take 1 tablet by mouth daily Yes Clayton Meza MD      Social History     Socioeconomic History    Marital status:      Spouse name: None    Number of children: None    Years of education: None    Highest education level: None   Tobacco Use    Smoking status: Never    Smokeless tobacco: Never   Vaping Use    Vaping Use: Never used   Substance and Sexual Activity    Alcohol use: No    Drug use: No     Social Determinants of Health     Financial Resource Strain: Low Risk  (5/30/2024)    Overall Financial Resource Strain

## 2024-06-06 DIAGNOSIS — R73.9 HYPERGLYCEMIA: ICD-10-CM

## 2024-06-06 DIAGNOSIS — E78.2 MIXED HYPERLIPIDEMIA: Primary | ICD-10-CM

## 2024-06-06 RX ORDER — ATORVASTATIN CALCIUM 80 MG/1
80 TABLET, FILM COATED ORAL DAILY
Qty: 90 TABLET | Refills: 1 | Status: SHIPPED | OUTPATIENT
Start: 2024-06-06

## 2024-06-06 RX ORDER — GLIPIZIDE 2.5 MG/1
2.5 TABLET, EXTENDED RELEASE ORAL DAILY
Qty: 90 TABLET | Refills: 1 | Status: SHIPPED | OUTPATIENT
Start: 2024-06-06

## 2024-06-17 RX ORDER — PANTOPRAZOLE SODIUM 20 MG/1
20 TABLET, DELAYED RELEASE ORAL
Qty: 90 TABLET | Refills: 0 | Status: SHIPPED | OUTPATIENT
Start: 2024-06-17

## 2024-06-30 DIAGNOSIS — R73.9 HYPERGLYCEMIA: ICD-10-CM

## 2024-07-01 RX ORDER — GLIPIZIDE 2.5 MG/1
2.5 TABLET, EXTENDED RELEASE ORAL DAILY
Qty: 90 TABLET | Refills: 1 | Status: SHIPPED | OUTPATIENT
Start: 2024-07-01

## 2024-07-12 DIAGNOSIS — E78.2 MIXED HYPERLIPIDEMIA: ICD-10-CM

## 2024-07-12 RX ORDER — ATORVASTATIN CALCIUM 80 MG/1
80 TABLET, FILM COATED ORAL DAILY
Qty: 90 TABLET | Refills: 0 | Status: SHIPPED | OUTPATIENT
Start: 2024-07-12

## 2024-08-30 DIAGNOSIS — E11.9 DIABETES MELLITUS WITHOUT COMPLICATION (HCC): ICD-10-CM

## 2024-08-30 DIAGNOSIS — I10 HYPERTENSION, UNSPECIFIED TYPE: ICD-10-CM

## 2024-08-30 DIAGNOSIS — E78.2 MIXED HYPERLIPIDEMIA: ICD-10-CM

## 2024-08-30 LAB
ALBUMIN: 4.3 G/DL (ref 3.5–5.2)
ALP BLD-CCNC: 116 U/L (ref 40–129)
ALT SERPL-CCNC: 25 U/L (ref 0–40)
ANION GAP SERPL CALCULATED.3IONS-SCNC: 16 MMOL/L (ref 7–16)
AST SERPL-CCNC: 29 U/L (ref 0–39)
BILIRUB SERPL-MCNC: 1.3 MG/DL (ref 0–1.2)
BUN BLDV-MCNC: 18 MG/DL (ref 6–23)
CALCIUM SERPL-MCNC: 9.5 MG/DL (ref 8.6–10.2)
CHLORIDE BLD-SCNC: 104 MMOL/L (ref 98–107)
CHOLESTEROL, TOTAL: 97 MG/DL
CO2: 23 MMOL/L (ref 22–29)
CREAT SERPL-MCNC: 1.2 MG/DL (ref 0.7–1.2)
CREATININE URINE: 140.3 MG/DL (ref 40–278)
GFR, ESTIMATED: 65 ML/MIN/1.73M2
GLUCOSE BLD-MCNC: 119 MG/DL (ref 74–99)
HDLC SERPL-MCNC: 29 MG/DL
LDL CHOLESTEROL: 48 MG/DL
MICROALBUMIN/CREAT 24H UR: <12 MG/L (ref 0–19)
MICROALBUMIN/CREAT UR-RTO: NORMAL MCG/MG CREAT (ref 0–30)
POTASSIUM SERPL-SCNC: 4.9 MMOL/L (ref 3.5–5)
SODIUM BLD-SCNC: 143 MMOL/L (ref 132–146)
TOTAL PROTEIN: 6.9 G/DL (ref 6.4–8.3)
TRIGL SERPL-MCNC: 102 MG/DL
VLDLC SERPL CALC-MCNC: 20 MG/DL

## 2024-09-06 ENCOUNTER — OFFICE VISIT (OUTPATIENT)
Dept: FAMILY MEDICINE CLINIC | Age: 69
End: 2024-09-06

## 2024-09-06 VITALS
BODY MASS INDEX: 27.6 KG/M2 | SYSTOLIC BLOOD PRESSURE: 126 MMHG | WEIGHT: 187 LBS | HEART RATE: 56 BPM | DIASTOLIC BLOOD PRESSURE: 70 MMHG | TEMPERATURE: 97.3 F | OXYGEN SATURATION: 96 %

## 2024-09-06 DIAGNOSIS — E78.2 MIXED HYPERLIPIDEMIA: ICD-10-CM

## 2024-09-06 DIAGNOSIS — I25.110 CORONARY ARTERY DISEASE INVOLVING NATIVE CORONARY ARTERY OF NATIVE HEART WITH UNSTABLE ANGINA PECTORIS (HCC): ICD-10-CM

## 2024-09-06 DIAGNOSIS — I10 HYPERTENSION, UNSPECIFIED TYPE: ICD-10-CM

## 2024-09-06 DIAGNOSIS — E11.9 DIABETES MELLITUS WITHOUT COMPLICATION (HCC): Primary | ICD-10-CM

## 2024-09-06 PROBLEM — K80.50 CHOLEDOCHOLITHIASIS: Status: RESOLVED | Noted: 2023-03-09 | Resolved: 2024-09-06

## 2024-09-06 PROBLEM — R68.89 ABNORMAL ANKLE BRACHIAL INDEX: Status: RESOLVED | Noted: 2023-11-16 | Resolved: 2024-09-06

## 2024-09-06 LAB — HBA1C MFR BLD: 6.1 %

## 2024-09-06 NOTE — PROGRESS NOTES
OFFICE PROGRESS NOTE      SUBJECTIVE:        Patient ID:   Asim Mora is a 69 y.o. male who presents for   Chief Complaint   Patient presents with    Diabetes     Here for recheck on DM and Hyperlipidemia.Lab work done,here for review.           HPI:   Patient here for the follow-up  Blood pressure 126/70 stable with the medication  Patient does follow with the cardiology  Lab work reviewed  Fasting sugar 119  Lipids are normal  Hemoglobin A1c 6 .1    Prior to Visit Medications    Medication Sig Taking? Authorizing Provider   atorvastatin (LIPITOR) 80 MG tablet Take 1 tablet by mouth daily Yes Yemi Rg MD   glipiZIDE (GLUCOTROL XL) 2.5 MG extended release tablet Take 1 tablet by mouth daily Yes Yemi Rg MD   pantoprazole (PROTONIX) 20 MG tablet TAKE ONE TABLET BY MOUTH EVERY MORNING before breakfast Yes Fortino Iqbal MD   turmeric 500 MG CAPS Take 2 capsules by mouth daily Yes Clayton Meza MD   Multiple Vitamins-Minerals (THERAPEUTIC MULTIVITAMIN-MINERALS) tablet Take 1 tablet by mouth daily Yes Clayton Meza MD   metoprolol succinate (TOPROL XL) 100 MG extended release tablet Take 1 tablet by mouth daily Yes Clayton Meza MD   aspirin 81 MG tablet Take 1 tablet by mouth daily Yes Clayton Meza MD      Social History     Socioeconomic History    Marital status:      Spouse name: None    Number of children: None    Years of education: None    Highest education level: None   Tobacco Use    Smoking status: Never    Smokeless tobacco: Never   Vaping Use    Vaping status: Never Used   Substance and Sexual Activity    Alcohol use: No    Drug use: No     Social Determinants of Health     Financial Resource Strain: Low Risk  (5/30/2024)    Overall Financial Resource Strain (CARDIA)     Difficulty of Paying Living Expenses: Not very hard   Food Insecurity: No Food Insecurity (5/30/2024)    Hunger Vital Sign

## 2024-09-06 NOTE — PATIENT INSTRUCTIONS
Continue present treatment  Low-sodium low-fat low-carb diet  Regular exercise  Follow-up with the cardiology  Lab work before the next visit  Return to clinic earlier if any problems

## 2024-09-29 DIAGNOSIS — R73.9 HYPERGLYCEMIA: ICD-10-CM

## 2024-09-30 RX ORDER — GLIPIZIDE 2.5 MG/1
2.5 TABLET, EXTENDED RELEASE ORAL DAILY
Qty: 90 TABLET | Refills: 1 | Status: SHIPPED | OUTPATIENT
Start: 2024-09-30

## 2024-10-07 RX ORDER — PANTOPRAZOLE SODIUM 20 MG/1
20 TABLET, DELAYED RELEASE ORAL
Qty: 90 TABLET | Refills: 3 | Status: SHIPPED | OUTPATIENT
Start: 2024-10-07 | End: 2025-10-02

## 2024-10-22 DIAGNOSIS — E78.2 MIXED HYPERLIPIDEMIA: ICD-10-CM

## 2024-10-23 RX ORDER — ATORVASTATIN CALCIUM 80 MG/1
80 TABLET, FILM COATED ORAL DAILY
Qty: 90 TABLET | Refills: 0 | Status: SHIPPED | OUTPATIENT
Start: 2024-10-23

## 2024-12-09 DIAGNOSIS — I10 HYPERTENSION, UNSPECIFIED TYPE: ICD-10-CM

## 2024-12-09 DIAGNOSIS — E78.2 MIXED HYPERLIPIDEMIA: ICD-10-CM

## 2024-12-09 DIAGNOSIS — I25.110 CORONARY ARTERY DISEASE INVOLVING NATIVE CORONARY ARTERY OF NATIVE HEART WITH UNSTABLE ANGINA PECTORIS (HCC): ICD-10-CM

## 2024-12-09 DIAGNOSIS — E11.9 DIABETES MELLITUS WITHOUT COMPLICATION (HCC): ICD-10-CM

## 2024-12-09 LAB
ALBUMIN: 4.3 G/DL (ref 3.5–5.2)
ALP BLD-CCNC: 103 U/L (ref 40–129)
ALT SERPL-CCNC: 25 U/L (ref 0–40)
ANION GAP SERPL CALCULATED.3IONS-SCNC: 11 MMOL/L (ref 7–16)
AST SERPL-CCNC: 26 U/L (ref 0–39)
BASOPHILS ABSOLUTE: 0.04 K/UL (ref 0–0.2)
BASOPHILS RELATIVE PERCENT: 0 % (ref 0–2)
BILIRUB SERPL-MCNC: 0.9 MG/DL (ref 0–1.2)
BUN BLDV-MCNC: 19 MG/DL (ref 6–23)
CALCIUM SERPL-MCNC: 9.5 MG/DL (ref 8.6–10.2)
CHLORIDE BLD-SCNC: 102 MMOL/L (ref 98–107)
CHOLESTEROL, TOTAL: 94 MG/DL
CO2: 25 MMOL/L (ref 22–29)
CREAT SERPL-MCNC: 1.2 MG/DL (ref 0.7–1.2)
EOSINOPHILS ABSOLUTE: 0.3 K/UL (ref 0.05–0.5)
EOSINOPHILS RELATIVE PERCENT: 3 % (ref 0–6)
GFR, ESTIMATED: 69 ML/MIN/1.73M2
GLUCOSE BLD-MCNC: 145 MG/DL (ref 74–99)
HCT VFR BLD CALC: 45 % (ref 37–54)
HDLC SERPL-MCNC: 27 MG/DL
HEMOGLOBIN: 15 G/DL (ref 12.5–16.5)
IMMATURE GRANULOCYTES %: 0 % (ref 0–5)
IMMATURE GRANULOCYTES ABSOLUTE: 0.04 K/UL (ref 0–0.58)
LDL CHOLESTEROL: 43 MG/DL
LYMPHOCYTES ABSOLUTE: 1.47 K/UL (ref 1.5–4)
LYMPHOCYTES RELATIVE PERCENT: 16 % (ref 20–42)
MCH RBC QN AUTO: 32.4 PG (ref 26–35)
MCHC RBC AUTO-ENTMCNC: 33.3 G/DL (ref 32–34.5)
MCV RBC AUTO: 97.2 FL (ref 80–99.9)
MONOCYTES ABSOLUTE: 0.66 K/UL (ref 0.1–0.95)
MONOCYTES RELATIVE PERCENT: 7 % (ref 2–12)
NEUTROPHILS ABSOLUTE: 6.79 K/UL (ref 1.8–7.3)
NEUTROPHILS RELATIVE PERCENT: 73 % (ref 43–80)
PDW BLD-RTO: 13 % (ref 11.5–15)
PLATELET # BLD: 168 K/UL (ref 130–450)
PMV BLD AUTO: 10.2 FL (ref 7–12)
POTASSIUM SERPL-SCNC: 4.4 MMOL/L (ref 3.5–5)
RBC # BLD: 4.63 M/UL (ref 3.8–5.8)
SODIUM BLD-SCNC: 138 MMOL/L (ref 132–146)
TOTAL PROTEIN: 6.8 G/DL (ref 6.4–8.3)
TRIGL SERPL-MCNC: 120 MG/DL
VLDLC SERPL CALC-MCNC: 24 MG/DL
WBC # BLD: 9.3 K/UL (ref 4.5–11.5)

## 2024-12-13 ENCOUNTER — OFFICE VISIT (OUTPATIENT)
Dept: FAMILY MEDICINE CLINIC | Age: 69
End: 2024-12-13

## 2024-12-13 VITALS
DIASTOLIC BLOOD PRESSURE: 80 MMHG | TEMPERATURE: 97.4 F | BODY MASS INDEX: 28.34 KG/M2 | WEIGHT: 192 LBS | SYSTOLIC BLOOD PRESSURE: 120 MMHG | OXYGEN SATURATION: 97 % | HEART RATE: 61 BPM

## 2024-12-13 DIAGNOSIS — E78.2 MIXED HYPERLIPIDEMIA: ICD-10-CM

## 2024-12-13 DIAGNOSIS — I10 HYPERTENSION, UNSPECIFIED TYPE: ICD-10-CM

## 2024-12-13 DIAGNOSIS — E11.9 DIABETES MELLITUS WITHOUT COMPLICATION (HCC): Primary | ICD-10-CM

## 2024-12-13 DIAGNOSIS — I25.110 CORONARY ARTERY DISEASE INVOLVING NATIVE CORONARY ARTERY OF NATIVE HEART WITH UNSTABLE ANGINA PECTORIS (HCC): ICD-10-CM

## 2024-12-13 LAB — HBA1C MFR BLD: 6.1 %

## 2024-12-13 RX ORDER — NITROGLYCERIN 0.4 MG/1
0.4 TABLET SUBLINGUAL EVERY 5 MIN PRN
COMMUNITY
Start: 2024-09-20

## 2024-12-13 NOTE — PROGRESS NOTES
Worried About Running Out of Food in the Last Year: Never true     Ran Out of Food in the Last Year: Never true   Transportation Needs: Unknown (5/30/2024)    PRAPARE - Transportation     Lack of Transportation (Non-Medical): No   Physical Activity: Sufficiently Active (2/9/2024)    Exercise Vital Sign     Days of Exercise per Week: 3 days     Minutes of Exercise per Session: 60 min   Housing Stability: Unknown (5/30/2024)    Housing Stability Vital Sign     Unstable Housing in the Last Year: No       I have reviewed Asim's allergies, medications, problem list, medical, social and family history and have updated as needed in the electronic medical record”  Review Of Systems:    Review of Systems   Constitutional: Negative.    HENT: Negative.     Eyes: Negative.    Respiratory: Negative.     Cardiovascular: Negative.    Gastrointestinal: Negative.    Endocrine: Negative.    Genitourinary: Negative.    Musculoskeletal: Negative.    Allergic/Immunologic: Negative.    Neurological: Negative.    Hematological: Negative.    Psychiatric/Behavioral: Negative.                OBJECTIVE:     VS:  Wt Readings from Last 3 Encounters:   12/13/24 87.1 kg (192 lb)   09/06/24 84.8 kg (187 lb)   05/30/24 85.3 kg (188 lb)     Temp Readings from Last 3 Encounters:   12/13/24 97.4 °F (36.3 °C) (Infrared)   09/06/24 97.3 °F (36.3 °C) (Infrared)   05/30/24 97.7 °F (36.5 °C) (Infrared)     BP Readings from Last 3 Encounters:   12/13/24 120/80   09/06/24 126/70   05/30/24 128/80        Physical Exam  Constitutional:       Appearance: He is well-developed.   HENT:      Head: Normocephalic and atraumatic.   Eyes:      Conjunctiva/sclera: Conjunctivae normal.      Pupils: Pupils are equal, round, and reactive to light.   Cardiovascular:      Rate and Rhythm: Normal rate and regular rhythm.      Heart sounds: Normal heart sounds.   Pulmonary:      Effort: Pulmonary effort is normal.      Breath sounds: Normal breath sounds.   Abdominal:

## 2024-12-24 DIAGNOSIS — R73.9 HYPERGLYCEMIA: ICD-10-CM

## 2025-01-15 RX ORDER — GLIPIZIDE 2.5 MG/1
2.5 TABLET, EXTENDED RELEASE ORAL DAILY
Qty: 90 TABLET | Refills: 0 | Status: SHIPPED | OUTPATIENT
Start: 2025-01-15

## 2025-01-15 NOTE — TELEPHONE ENCOUNTER
Name of Medication(s) Requested:  Requested Prescriptions     Pending Prescriptions Disp Refills    glipiZIDE (GLUCOTROL XL) 2.5 MG extended release tablet 90 tablet 0     Sig: Take 1 tablet by mouth daily       Medication is on current medication list Yes    Dosage and directions were verified? Yes    Quantity verified: 90 day supply     Pharmacy Verified?  Yes    Last Appointment:  12/13/2024    Future appts:  Future Appointments   Date Time Provider Department Center   2/14/2025  1:30 PM Yemi Rg MD [facility] DEP   3/21/2025  1:30 PM Yemi Rg MD [facility] DE[facility]        (If no appt send self scheduling link. .REFILLAPPT)  Scheduling request sent?     [] Yes  [x] No    Does patient need updated?  [] Yes  [x] No

## 2025-01-22 DIAGNOSIS — E78.2 MIXED HYPERLIPIDEMIA: ICD-10-CM

## 2025-01-22 RX ORDER — ATORVASTATIN CALCIUM 80 MG/1
80 TABLET, FILM COATED ORAL DAILY
Qty: 90 TABLET | Refills: 0 | Status: SHIPPED | OUTPATIENT
Start: 2025-01-22

## 2025-01-22 NOTE — TELEPHONE ENCOUNTER
Name of Medication(s) Requested:  Requested Prescriptions     Pending Prescriptions Disp Refills    atorvastatin (LIPITOR) 80 MG tablet [Pharmacy Med Name: Atorvastatin Calcium Oral Tablet 80 MG] 90 tablet 0     Sig: TAKE ONE TABLET BY MOUTH DAILY       Medication is on current medication list Yes    Dosage and directions were verified? Yes    Quantity verified: 90 day supply     Pharmacy Verified?  Yes    Last Appointment:  12/13/2024    Future appts:  Future Appointments   Date Time Provider Department Center   2/14/2025  1:30 PM Yemi Rg MD [place] DEP   3/21/2025  1:30 PM Yemi Rg MD [facility] DE[facility]        (If no appt send self scheduling link. .REFILLAPPT)  Scheduling request sent?     [] Yes  [x] No    Does patient need updated?  [] Yes  [x] No

## 2025-03-07 ENCOUNTER — OFFICE VISIT (OUTPATIENT)
Dept: FAMILY MEDICINE CLINIC | Age: 70
End: 2025-03-07

## 2025-03-07 VITALS
TEMPERATURE: 97.2 F | OXYGEN SATURATION: 95 % | DIASTOLIC BLOOD PRESSURE: 64 MMHG | HEIGHT: 69 IN | WEIGHT: 192 LBS | HEART RATE: 65 BPM | BODY MASS INDEX: 28.44 KG/M2 | RESPIRATION RATE: 18 BRPM | SYSTOLIC BLOOD PRESSURE: 108 MMHG

## 2025-03-07 DIAGNOSIS — Z00.00 MEDICARE ANNUAL WELLNESS VISIT, SUBSEQUENT: Primary | ICD-10-CM

## 2025-03-07 SDOH — ECONOMIC STABILITY: INCOME INSECURITY: IN THE LAST 12 MONTHS, WAS THERE A TIME WHEN YOU WERE NOT ABLE TO PAY THE MORTGAGE OR RENT ON TIME?: NO

## 2025-03-07 SDOH — HEALTH STABILITY: PHYSICAL HEALTH: ON AVERAGE, HOW MANY DAYS PER WEEK DO YOU ENGAGE IN MODERATE TO STRENUOUS EXERCISE (LIKE A BRISK WALK)?: 3 DAYS

## 2025-03-07 SDOH — ECONOMIC STABILITY: TRANSPORTATION INSECURITY
IN THE PAST 12 MONTHS, HAS LACK OF TRANSPORTATION KEPT YOU FROM MEETINGS, WORK, OR FROM GETTING THINGS NEEDED FOR DAILY LIVING?: NO

## 2025-03-07 SDOH — ECONOMIC STABILITY: FOOD INSECURITY: WITHIN THE PAST 12 MONTHS, YOU WORRIED THAT YOUR FOOD WOULD RUN OUT BEFORE YOU GOT MONEY TO BUY MORE.: NEVER TRUE

## 2025-03-07 SDOH — HEALTH STABILITY: PHYSICAL HEALTH: ON AVERAGE, HOW MANY MINUTES DO YOU ENGAGE IN EXERCISE AT THIS LEVEL?: 60 MIN

## 2025-03-07 SDOH — ECONOMIC STABILITY: FOOD INSECURITY: WITHIN THE PAST 12 MONTHS, THE FOOD YOU BOUGHT JUST DIDN'T LAST AND YOU DIDN'T HAVE MONEY TO GET MORE.: NEVER TRUE

## 2025-03-07 SDOH — ECONOMIC STABILITY: TRANSPORTATION INSECURITY
IN THE PAST 12 MONTHS, HAS THE LACK OF TRANSPORTATION KEPT YOU FROM MEDICAL APPOINTMENTS OR FROM GETTING MEDICATIONS?: NO

## 2025-03-07 ASSESSMENT — LIFESTYLE VARIABLES
HOW MANY STANDARD DRINKS CONTAINING ALCOHOL DO YOU HAVE ON A TYPICAL DAY: 0
HOW OFTEN DO YOU HAVE A DRINK CONTAINING ALCOHOL: 1
HOW OFTEN DO YOU HAVE A DRINK CONTAINING ALCOHOL: NEVER
HOW OFTEN DO YOU HAVE SIX OR MORE DRINKS ON ONE OCCASION: 1
HOW MANY STANDARD DRINKS CONTAINING ALCOHOL DO YOU HAVE ON A TYPICAL DAY: PATIENT DOES NOT DRINK

## 2025-03-07 ASSESSMENT — PATIENT HEALTH QUESTIONNAIRE - PHQ9
2. FEELING DOWN, DEPRESSED OR HOPELESS: NOT AT ALL
SUM OF ALL RESPONSES TO PHQ QUESTIONS 1-9: 0
SUM OF ALL RESPONSES TO PHQ QUESTIONS 1-9: 0
1. LITTLE INTEREST OR PLEASURE IN DOING THINGS: NOT AT ALL
SUM OF ALL RESPONSES TO PHQ QUESTIONS 1-9: 0
SUM OF ALL RESPONSES TO PHQ QUESTIONS 1-9: 0

## 2025-03-07 NOTE — PROGRESS NOTES
Provider, MD Clayton   metoprolol succinate (TOPROL XL) 100 MG extended release tablet Take 1 tablet by mouth daily Yes ProviderClayton MD   aspirin 81 MG tablet Take 1 tablet by mouth daily Yes Provider, Historical, MD       CareTeam (Including outside providers/suppliers regularly involved in providing care):   Patient Care Team:  Yemi Rg MD as PCP - General (Family Medicine)  Yemi Rg MD as PCP - Empaneled Provider  Umu Martinez RPH as Pharmacist (Pharmacist)     Recommendations for Preventive Services Due: see orders and patient instructions/AVS.  Recommended screening schedule for the next 5-10 years is provided to the patient in written form: see Patient Instructions/AVS.     Reviewed and updated this visit:  Tobacco  Allergies  Meds  Sexual Hx

## 2025-03-07 NOTE — PATIENT INSTRUCTIONS
of quitting for good. Quitting is one of the most important things you can do to protect your heart. It is never too late to quit. Try to avoid secondhand smoke too.     Stay at a weight that's healthy for you. Talk to your doctor if you need help losing weight.     Try to get 7 to 9 hours of sleep each night.     Limit alcohol to 2 drinks a day for men and 1 drink a day for women. Too much alcohol can cause health problems.     Manage other health problems such as diabetes, high blood pressure, and high cholesterol. If you think you may have a problem with alcohol or drug use, talk to your doctor.   Medicines    Take your medicines exactly as prescribed. Call your doctor if you think you are having a problem with your medicine.     If your doctor recommends aspirin, take the amount directed each day. Make sure you take aspirin and not another kind of pain reliever, such as acetaminophen (Tylenol).   When should you call for help?   Call 911 if you have symptoms of a heart attack. These may include:    Chest pain or pressure, or a strange feeling in the chest.     Sweating.     Shortness of breath.     Pain, pressure, or a strange feeling in the back, neck, jaw, or upper belly or in one or both shoulders or arms.     Lightheadedness or sudden weakness.     A fast or irregular heartbeat.   After you call 911, the  may tell you to chew 1 adult-strength or 2 to 4 low-dose aspirin. Wait for an ambulance. Do not try to drive yourself.  Watch closely for changes in your health, and be sure to contact your doctor if you have any problems.  Where can you learn more?  Go to https://www.[employer].net/patientEd and enter F075 to learn more about \"A Healthy Heart: Care Instructions.\"  Current as of: July 31, 2024  Content Version: 14.3  © 2024 [employer].   Care instructions adapted under license by [employer]. If you have questions about a medical condition or this instruction, always ask your healthcare

## 2025-03-15 ENCOUNTER — HOSPITAL ENCOUNTER (OUTPATIENT)
Age: 70
Discharge: HOME | End: 2025-03-15
Payer: MEDICARE

## 2025-03-15 DIAGNOSIS — I25.110 CORONARY ARTERY DISEASE INVOLVING NATIVE CORONARY ARTERY OF NATIVE HEART WITH UNSTABLE ANGINA PECTORIS (HCC): ICD-10-CM

## 2025-03-15 DIAGNOSIS — E11.9 DIABETES MELLITUS WITHOUT COMPLICATION (HCC): ICD-10-CM

## 2025-03-15 DIAGNOSIS — I10 HYPERTENSION, UNSPECIFIED TYPE: ICD-10-CM

## 2025-03-15 DIAGNOSIS — E78.2 MIXED HYPERLIPIDEMIA: ICD-10-CM

## 2025-03-15 LAB
ALBUMIN SERPL-MCNC: 4.4 G/DL (ref 3.5–5.2)
ALP SERPL-CCNC: 104 U/L (ref 40–129)
ALT SERPL-CCNC: 34 U/L (ref 0–40)
ANION GAP SERPL CALCULATED.3IONS-SCNC: 8 MMOL/L (ref 7–16)
AST SERPL-CCNC: 31 U/L (ref 0–39)
BASOPHILS # BLD: 0.04 K/UL (ref 0–0.2)
BASOPHILS NFR BLD: 1 % (ref 0–2)
BILIRUB SERPL-MCNC: 1.2 MG/DL (ref 0–1.2)
BUN SERPL-MCNC: 19 MG/DL (ref 6–23)
CALCIUM SERPL-MCNC: 9 MG/DL (ref 8.6–10.2)
CHLORIDE SERPL-SCNC: 104 MMOL/L (ref 98–107)
CHOLEST SERPL-MCNC: 87 MG/DL
CO2 SERPL-SCNC: 26 MMOL/L (ref 22–29)
CREAT SERPL-MCNC: 1.1 MG/DL (ref 0.7–1.2)
EOSINOPHIL # BLD: 0.21 K/UL (ref 0.05–0.5)
EOSINOPHILS RELATIVE PERCENT: 3 % (ref 0–6)
ERYTHROCYTE [DISTWIDTH] IN BLOOD BY AUTOMATED COUNT: 12.8 % (ref 11.5–15)
GFR, ESTIMATED: 71 ML/MIN/1.73M2
GLUCOSE SERPL-MCNC: 136 MG/DL (ref 74–99)
HCT VFR BLD AUTO: 43.3 % (ref 37–54)
HDLC SERPL-MCNC: 31 MG/DL
HGB BLD-MCNC: 14.9 G/DL (ref 12.5–16.5)
IMM GRANULOCYTES # BLD AUTO: <0.03 K/UL (ref 0–0.58)
IMM GRANULOCYTES NFR BLD: 0 % (ref 0–5)
LDLC SERPL CALC-MCNC: 37 MG/DL
LYMPHOCYTES NFR BLD: 1.4 K/UL (ref 1.5–4)
LYMPHOCYTES RELATIVE PERCENT: 18 % (ref 20–42)
MCH RBC QN AUTO: 32.7 PG (ref 26–35)
MCHC RBC AUTO-ENTMCNC: 34.4 G/DL (ref 32–34.5)
MCV RBC AUTO: 95.2 FL (ref 80–99.9)
MONOCYTES NFR BLD: 0.66 K/UL (ref 0.1–0.95)
MONOCYTES NFR BLD: 9 % (ref 2–12)
NEUTROPHILS NFR BLD: 70 % (ref 43–80)
NEUTS SEG NFR BLD: 5.45 K/UL (ref 1.8–7.3)
PLATELET, FLUORESCENCE: 150 K/UL (ref 130–450)
PMV BLD AUTO: 10.2 FL (ref 7–12)
POTASSIUM SERPL-SCNC: 4.4 MMOL/L (ref 3.5–5)
PROT SERPL-MCNC: 6.7 G/DL (ref 6.4–8.3)
RBC # BLD AUTO: 4.55 M/UL (ref 3.8–5.8)
SODIUM SERPL-SCNC: 138 MMOL/L (ref 132–146)
TRIGL SERPL-MCNC: 97 MG/DL
VLDLC SERPL CALC-MCNC: 19 MG/DL
WBC OTHER # BLD: 7.8 K/UL (ref 4.5–11.5)

## 2025-03-15 PROCEDURE — 36415 COLL VENOUS BLD VENIPUNCTURE: CPT

## 2025-03-15 PROCEDURE — 80053 COMPREHEN METABOLIC PANEL: CPT

## 2025-03-15 PROCEDURE — 80061 LIPID PANEL: CPT

## 2025-03-15 PROCEDURE — 85025 COMPLETE CBC W/AUTO DIFF WBC: CPT

## 2025-03-21 ENCOUNTER — OFFICE VISIT (OUTPATIENT)
Dept: FAMILY MEDICINE CLINIC | Age: 70
End: 2025-03-21

## 2025-03-21 VITALS
HEIGHT: 69 IN | TEMPERATURE: 98 F | WEIGHT: 190 LBS | OXYGEN SATURATION: 98 % | SYSTOLIC BLOOD PRESSURE: 92 MMHG | BODY MASS INDEX: 28.14 KG/M2 | RESPIRATION RATE: 16 BRPM | DIASTOLIC BLOOD PRESSURE: 62 MMHG | HEART RATE: 58 BPM

## 2025-03-21 DIAGNOSIS — I25.110 CORONARY ARTERY DISEASE INVOLVING NATIVE CORONARY ARTERY OF NATIVE HEART WITH UNSTABLE ANGINA PECTORIS (HCC): Chronic | ICD-10-CM

## 2025-03-21 DIAGNOSIS — E11.9 DIABETES MELLITUS WITHOUT COMPLICATION: Primary | ICD-10-CM

## 2025-03-21 DIAGNOSIS — E78.2 MIXED HYPERLIPIDEMIA: ICD-10-CM

## 2025-03-21 DIAGNOSIS — I10 HYPERTENSION, UNSPECIFIED TYPE: ICD-10-CM

## 2025-03-21 LAB — HBA1C MFR BLD: 6.1 %

## 2025-03-21 NOTE — PROGRESS NOTES
CBC with Auto Differential; Future  -     Comprehensive Metabolic Panel; Future  -     Lipid Panel; Future  2. Mixed hyperlipidemia  -     CBC with Auto Differential; Future  -     Comprehensive Metabolic Panel; Future  -     Lipid Panel; Future  3. Hypertension, unspecified type  -     CBC with Auto Differential; Future  -     Comprehensive Metabolic Panel; Future  4. Coronary artery disease involving native coronary artery of native heart with unstable angina pectoris (HCC)  Comments:  Patient stable  No chest  Does follow with the cardiology  Assessment & Plan:     Orders:  -     CBC with Auto Differential; Future       PLAN:   Continue present treatment  Low-sodium low-fat low-carb diet  Regular exercise  Follow-up with cardiology  Lab work before the next visit  Return to clinic earlier if any problems  All instruction given to the patient        There are no Patient Instructions on file for this visit.    No follow-ups on file.         Maryjo reviewed my findings and recommendations with Asim Mora.    Electronicallysigned by Yemi Rg MD on 3/21/25 at 2:01 PM EDT

## 2025-04-04 DIAGNOSIS — R73.9 HYPERGLYCEMIA: ICD-10-CM

## 2025-04-04 RX ORDER — GLIPIZIDE 2.5 MG/1
2.5 TABLET, EXTENDED RELEASE ORAL DAILY
Qty: 90 TABLET | Refills: 0 | Status: SHIPPED | OUTPATIENT
Start: 2025-04-04

## 2025-04-04 NOTE — TELEPHONE ENCOUNTER
Name of Medication(s) Requested:  Requested Prescriptions     Pending Prescriptions Disp Refills    glipiZIDE (GLUCOTROL XL) 2.5 MG extended release tablet 90 tablet 0     Sig: Take 1 tablet by mouth daily       Medication is on current medication list Yes    Dosage and directions were verified? Yes    Quantity verified: 90 day supply     Pharmacy Verified?  Yes    Last Appointment:  3/21/2025    Future appts:  Future Appointments   Date Time Provider Department Center   7/3/2025  2:00 PM Yemi Rg MD [facility] DEP   3/13/2026  2:00 PM Yemi Rg MD [facility] DEP        (If no appt send self scheduling link. .REFILLAPPT)  Scheduling request sent?     [] Yes  [x] No    Does patient need updated?  [] Yes  [x] No

## 2025-05-02 DIAGNOSIS — E78.2 MIXED HYPERLIPIDEMIA: ICD-10-CM

## 2025-05-02 RX ORDER — ATORVASTATIN CALCIUM 80 MG/1
80 TABLET, FILM COATED ORAL DAILY
Qty: 90 TABLET | Refills: 0 | Status: SHIPPED | OUTPATIENT
Start: 2025-05-02

## 2025-05-02 NOTE — TELEPHONE ENCOUNTER
Name of Medication(s) Requested:  Requested Prescriptions     Pending Prescriptions Disp Refills    atorvastatin (LIPITOR) 80 MG tablet 90 tablet 0     Sig: Take 1 tablet by mouth daily       Medication is on current medication list Yes    Dosage and directions were verified? Yes    Quantity verified: 90 day supply     Pharmacy Verified?  Yes    Last Appointment:  3/21/2025    Future appts:  Future Appointments   Date Time Provider Department Center   7/3/2025  2:00 PM Yemi Rg MD [facility] DEP   3/13/2026  2:00 PM Yemi Rg MD [facility] DEP        (If no appt send self scheduling link. .REFILLAPPT)  Scheduling request sent?     [] Yes  [x] No    Does patient need updated?  [] Yes  [x] No

## 2025-06-27 DIAGNOSIS — I25.110 CORONARY ARTERY DISEASE INVOLVING NATIVE CORONARY ARTERY OF NATIVE HEART WITH UNSTABLE ANGINA PECTORIS (HCC): Chronic | ICD-10-CM

## 2025-06-27 DIAGNOSIS — E78.2 MIXED HYPERLIPIDEMIA: ICD-10-CM

## 2025-06-27 DIAGNOSIS — E11.9 DIABETES MELLITUS WITHOUT COMPLICATION (HCC): ICD-10-CM

## 2025-06-27 DIAGNOSIS — I10 HYPERTENSION, UNSPECIFIED TYPE: ICD-10-CM

## 2025-06-27 LAB
ALBUMIN: 4.3 G/DL (ref 3.5–5.2)
ALP BLD-CCNC: 109 U/L (ref 40–129)
ALT SERPL-CCNC: 33 U/L (ref 0–50)
ANION GAP SERPL CALCULATED.3IONS-SCNC: 13 MMOL/L (ref 7–16)
AST SERPL-CCNC: 36 U/L (ref 0–50)
BASOPHILS ABSOLUTE: 0.05 K/UL (ref 0–0.2)
BASOPHILS RELATIVE PERCENT: 1 % (ref 0–2)
BILIRUB SERPL-MCNC: 1.3 MG/DL (ref 0–1.2)
BUN BLDV-MCNC: 18 MG/DL (ref 8–23)
CALCIUM SERPL-MCNC: 9.4 MG/DL (ref 8.8–10.2)
CHLORIDE BLD-SCNC: 103 MMOL/L (ref 98–107)
CHOLESTEROL, TOTAL: 100 MG/DL
CO2: 23 MMOL/L (ref 22–29)
CREAT SERPL-MCNC: 1.2 MG/DL (ref 0.7–1.2)
EOSINOPHILS ABSOLUTE: 0.27 K/UL (ref 0.05–0.5)
EOSINOPHILS RELATIVE PERCENT: 3 % (ref 0–6)
GFR, ESTIMATED: 66 ML/MIN/1.73M2
GLUCOSE BLD-MCNC: 136 MG/DL (ref 74–99)
HCT VFR BLD CALC: 44.7 % (ref 37–54)
HDLC SERPL-MCNC: 29 MG/DL
HEMOGLOBIN: 14.7 G/DL (ref 12.5–16.5)
IMMATURE GRANULOCYTES %: 0 % (ref 0–5)
IMMATURE GRANULOCYTES ABSOLUTE: <0.03 K/UL (ref 0–0.58)
LDL CHOLESTEROL: 41 MG/DL
LYMPHOCYTES ABSOLUTE: 1.62 K/UL (ref 1.5–4)
LYMPHOCYTES RELATIVE PERCENT: 19 % (ref 20–42)
MCH RBC QN AUTO: 32.1 PG (ref 26–35)
MCHC RBC AUTO-ENTMCNC: 32.9 G/DL (ref 32–34.5)
MCV RBC AUTO: 97.6 FL (ref 80–99.9)
MONOCYTES ABSOLUTE: 0.63 K/UL (ref 0.1–0.95)
MONOCYTES RELATIVE PERCENT: 8 % (ref 2–12)
NEUTROPHILS ABSOLUTE: 5.86 K/UL (ref 1.8–7.3)
NEUTROPHILS RELATIVE PERCENT: 69 % (ref 43–80)
PDW BLD-RTO: 12.7 % (ref 11.5–15)
PLATELET # BLD: 152 K/UL (ref 130–450)
PMV BLD AUTO: 10.4 FL (ref 7–12)
POTASSIUM SERPL-SCNC: 4.1 MMOL/L (ref 3.5–5.1)
RBC # BLD: 4.58 M/UL (ref 3.8–5.8)
SODIUM BLD-SCNC: 139 MMOL/L (ref 136–145)
TOTAL PROTEIN: 6.7 G/DL (ref 6.4–8.3)
TRIGL SERPL-MCNC: 151 MG/DL
VLDLC SERPL CALC-MCNC: 30 MG/DL
WBC # BLD: 8.5 K/UL (ref 4.5–11.5)

## 2025-06-30 RX ORDER — ATORVASTATIN CALCIUM 80 MG/1
80 TABLET, FILM COATED ORAL DAILY
Qty: 90 TABLET | Refills: 0 | OUTPATIENT
Start: 2025-06-30

## 2025-07-03 ENCOUNTER — OFFICE VISIT (OUTPATIENT)
Dept: FAMILY MEDICINE CLINIC | Age: 70
End: 2025-07-03

## 2025-07-03 VITALS
WEIGHT: 189 LBS | HEIGHT: 69 IN | OXYGEN SATURATION: 96 % | BODY MASS INDEX: 27.99 KG/M2 | HEART RATE: 57 BPM | RESPIRATION RATE: 16 BRPM | TEMPERATURE: 97.6 F | DIASTOLIC BLOOD PRESSURE: 66 MMHG | SYSTOLIC BLOOD PRESSURE: 112 MMHG

## 2025-07-03 DIAGNOSIS — E78.2 MIXED HYPERLIPIDEMIA: ICD-10-CM

## 2025-07-03 DIAGNOSIS — I10 HYPERTENSION, UNSPECIFIED TYPE: ICD-10-CM

## 2025-07-03 DIAGNOSIS — I25.110 CORONARY ARTERY DISEASE INVOLVING NATIVE CORONARY ARTERY OF NATIVE HEART WITH UNSTABLE ANGINA PECTORIS (HCC): Chronic | ICD-10-CM

## 2025-07-03 DIAGNOSIS — E11.9 DIABETES MELLITUS WITHOUT COMPLICATION (HCC): Primary | ICD-10-CM

## 2025-07-03 LAB — HBA1C MFR BLD: 6.1 %

## 2025-07-03 RX ORDER — ATORVASTATIN CALCIUM 80 MG/1
80 TABLET, FILM COATED ORAL DAILY
Qty: 90 TABLET | Refills: 0 | Status: SHIPPED | OUTPATIENT
Start: 2025-07-03

## 2025-07-03 ASSESSMENT — ENCOUNTER SYMPTOMS
EYES NEGATIVE: 1
GASTROINTESTINAL NEGATIVE: 1
ALLERGIC/IMMUNOLOGIC NEGATIVE: 1
RESPIRATORY NEGATIVE: 1

## 2025-07-03 NOTE — PROGRESS NOTES
OFFICE PROGRESS NOTE      SUBJECTIVE:        Patient ID:   Asim Mora is a 70 y.o. male who presents for   Chief Complaint   Patient presents with    Check-Up    Discuss Labs           HPI:   Patient here for the follow up  Has been feeling good  Lab work reviewed  Fasting sugar 136  Triglyceride 151  Rest of the lab work is normal  Denies any chest pain  Blood pressure 120/66  Hemoglobin A1c 6.1  Prior to Visit Medications    Medication Sig Taking? Authorizing Provider   atorvastatin (LIPITOR) 80 MG tablet Take 1 tablet by mouth daily Yes Yemi Rg MD   glipiZIDE (GLUCOTROL XL) 2.5 MG extended release tablet Take 1 tablet by mouth daily Yes Kenia Cruz MD   nitroGLYCERIN (NITROSTAT) 0.4 MG SL tablet Place 1 tablet under the tongue every 5 minutes as needed Yes Clayton Meza MD   pantoprazole (PROTONIX) 20 MG tablet Take 1 tablet by mouth every morning (before breakfast) Yes Fortino Iqbal MD   turmeric 500 MG CAPS Take 2 capsules by mouth daily Yes Clayton Meza MD   Multiple Vitamins-Minerals (THERAPEUTIC MULTIVITAMIN-MINERALS) tablet Take 1 tablet by mouth daily Yes Clayton Meza MD   metoprolol succinate (TOPROL XL) 100 MG extended release tablet Take 1 tablet by mouth daily Yes Clayton Meza MD   aspirin 81 MG tablet Take 1 tablet by mouth daily Yes Clayton Meza MD      Social History     Socioeconomic History    Marital status:      Spouse name: None    Number of children: None    Years of education: None    Highest education level: None   Tobacco Use    Smoking status: Never    Smokeless tobacco: Never   Vaping Use    Vaping status: Never Used   Substance and Sexual Activity    Alcohol use: No    Drug use: No     Social Drivers of Health     Financial Resource Strain: Low Risk  (5/30/2024)    Overall Financial Resource Strain (CARDIA)     Difficulty of Paying Living Expenses: Not very hard

## 2025-07-03 NOTE — PATIENT INSTRUCTIONS
Continue present treatment  Low-sodium low-fat low-carb diet  Regular exercises  Follow with the cardiology  Lab work before the next visit  Return to clinic earlier if any problem

## (undated) DEVICE — SYSTEM BX CAP BILI RAP EXCHG CAP LOK DEV COMPATIBLE W/ OLY

## (undated) DEVICE — ELECTRODE ES 36CM LAP FLAT L HK COAT DISP CLEANCOAT

## (undated) DEVICE — TROCAR: Brand: KII FIOS FIRST ENTRY

## (undated) DEVICE — VALVE SUCTION AIR H2O HYDR H2O JET CONN STRL ORCA POD + DISP

## (undated) DEVICE — GENERAL LAPAROSCOPY: Brand: MEDLINE INDUSTRIES, INC.

## (undated) DEVICE — Z INACTIVE USE 2762646 COVER ENDOSCP INSTRUMENT DSTL CVR DIP 20/EA

## (undated) DEVICE — SOLUTION IRRIG 3000ML 0.9% SOD CHL USP UROMATIC PLAS CONT

## (undated) DEVICE — BLOCK BITE 60FR CAREGUARD

## (undated) DEVICE — GAUZE,SPONGE,4"X4",8PLY,STRL,LF,10/TRAY: Brand: MEDLINE

## (undated) DEVICE — BITEBLOCK 54FR W/ DENT RIM BLOX

## (undated) DEVICE — TUBING, SUCTION, 1/4" X 10', STRAIGHT: Brand: MEDLINE

## (undated) DEVICE — CANNULATING SPHINCTEROTOME: Brand: JAGTOME™ REVOLUTION RX

## (undated) DEVICE — FORCEPS ENDOSCP L230CM WRK CHN 2.8CM SHTH 2.4MM JAW OPN

## (undated) DEVICE — PUMP SUC IRR TBNG L10FT W/ HNDPC ASSEMB STRYKEFLOW 2

## (undated) DEVICE — LAPAROSCOPIC SCISSORS: Brand: EPIX LAPAROSCOPIC SCISSORS

## (undated) DEVICE — KIT BEDSIDE REVITAL OX 500ML

## (undated) DEVICE — PENCIL ES L3M BTTN SWCH HOLSTER W/ BLDE ELECTRD EDGE

## (undated) DEVICE — SPONGE GZ 4IN 4IN 4 PLY N WVN AVANT

## (undated) DEVICE — TROCAR: Brand: KII® SLEEVE

## (undated) DEVICE — DEFENDO AIR WATER SUCTION AND BIOPSY VALVE KIT FOR  OLYMPUS: Brand: DEFENDO AIR/WATER/SUCTION AND BIOPSY VALVE

## (undated) DEVICE — CONTAINER SPEC 60ML PH 7NEUTRAL BUFF FRMLN RDY TO USE

## (undated) DEVICE — WARMER SCP LAP

## (undated) DEVICE — MEDIA CONTRAST ISOVUE  300 10X50ML

## (undated) DEVICE — GLOVE SURG SIGNATURE LTX ESS LTX PF 7.5

## (undated) DEVICE — TOWEL,OR,DSP,ST,BLUE,STD,6/PK,12PK/CS: Brand: MEDLINE

## (undated) DEVICE — CANNULA NSL ORAL AD FOR CAPNOFLEX CO2 O2 AIRLFE

## (undated) DEVICE — GOWN ISOLATN REG YEL M WT MULTIPLY SIDETIE LEV 2

## (undated) DEVICE — TISSUE RETRIEVAL SYSTEM: Brand: INZII RETRIEVAL SYSTEM

## (undated) DEVICE — ADAPTER CLEANING PORPOISE CLEANING

## (undated) DEVICE — SYSTEM INJ BILI RAP REFIL CONT

## (undated) DEVICE — APPLIER CLP M L L11.4IN DIA10MM ENDOSCP ROT MULT FOR LIG

## (undated) DEVICE — APPLIER CLP M/L SHFT DIA5MM 15 LIG LIGAMAX 5

## (undated) DEVICE — CONTAINER SPEC COLL 960ML POLYPR TRIANG GRAD INTAKE/OUTPUT

## (undated) DEVICE — FORCEPS BX L160CM JAW DIA2.4MM YEL L CAP W/ NDL DISP RAD

## (undated) DEVICE — ELECTRODE PT RET AD L9FT HI MOIST COND ADH HYDRGEL CORDED

## (undated) DEVICE — INSUFFLATION NEEDLE TO ESTABLISH PNEUMOPERITONEUM.: Brand: INSUFFLATION NEEDLE

## (undated) DEVICE — CONNECTOR IRRIGATION AUXILIARY H2O JET W/ PRT MTL THRD HYDR

## (undated) DEVICE — SPHINCTEROTOME: Brand: HYDRATOME RX 44

## (undated) DEVICE — RETRIEVAL BALLOON CATHETER: Brand: EXTRACTOR™ PRO RX

## (undated) DEVICE — CLOTH SURG PREP PREOPERATIVE CHLORHEXIDINE GLUC 2% READYPREP